# Patient Record
Sex: MALE | Race: WHITE | Employment: FULL TIME | URBAN - METROPOLITAN AREA
[De-identification: names, ages, dates, MRNs, and addresses within clinical notes are randomized per-mention and may not be internally consistent; named-entity substitution may affect disease eponyms.]

---

## 2017-06-21 ENCOUNTER — OFFICE VISIT (OUTPATIENT)
Dept: PRIMARY CARE CLINIC | Age: 32
End: 2017-06-21

## 2017-06-21 VITALS
BODY MASS INDEX: 38.95 KG/M2 | HEART RATE: 103 BPM | TEMPERATURE: 97.9 F | RESPIRATION RATE: 16 BRPM | SYSTOLIC BLOOD PRESSURE: 128 MMHG | HEIGHT: 68 IN | OXYGEN SATURATION: 97 % | DIASTOLIC BLOOD PRESSURE: 98 MMHG | WEIGHT: 257 LBS

## 2017-06-21 DIAGNOSIS — H66.93 BILATERAL OTITIS MEDIA, UNSPECIFIED CHRONICITY, UNSPECIFIED OTITIS MEDIA TYPE: Primary | ICD-10-CM

## 2017-06-21 DIAGNOSIS — J01.10 ACUTE NON-RECURRENT FRONTAL SINUSITIS: ICD-10-CM

## 2017-06-21 PROCEDURE — G8417 CALC BMI ABV UP PARAM F/U: HCPCS | Performed by: FAMILY MEDICINE

## 2017-06-21 PROCEDURE — 99213 OFFICE O/P EST LOW 20 MIN: CPT | Performed by: FAMILY MEDICINE

## 2017-06-21 PROCEDURE — 1036F TOBACCO NON-USER: CPT | Performed by: FAMILY MEDICINE

## 2017-06-21 PROCEDURE — 96372 THER/PROPH/DIAG INJ SC/IM: CPT | Performed by: FAMILY MEDICINE

## 2017-06-21 PROCEDURE — G8427 DOCREV CUR MEDS BY ELIG CLIN: HCPCS | Performed by: FAMILY MEDICINE

## 2017-06-21 RX ORDER — CEFTRIAXONE 1 G/1
1 INJECTION, POWDER, FOR SOLUTION INTRAMUSCULAR; INTRAVENOUS ONCE
Status: COMPLETED | OUTPATIENT
Start: 2017-06-21 | End: 2017-06-21

## 2017-06-21 RX ORDER — CEFDINIR 300 MG/1
300 CAPSULE ORAL 2 TIMES DAILY
Qty: 20 CAPSULE | Refills: 0 | Status: SHIPPED | OUTPATIENT
Start: 2017-06-21 | End: 2017-07-01

## 2017-06-21 RX ADMIN — CEFTRIAXONE 1 G: 1 INJECTION, POWDER, FOR SOLUTION INTRAMUSCULAR; INTRAVENOUS at 17:44

## 2017-06-21 ASSESSMENT — PATIENT HEALTH QUESTIONNAIRE - PHQ9
2. FEELING DOWN, DEPRESSED OR HOPELESS: 0
SUM OF ALL RESPONSES TO PHQ QUESTIONS 1-9: 0
1. LITTLE INTEREST OR PLEASURE IN DOING THINGS: 0
SUM OF ALL RESPONSES TO PHQ9 QUESTIONS 1 & 2: 0

## 2017-06-21 ASSESSMENT — ENCOUNTER SYMPTOMS
DIARRHEA: 0
SORE THROAT: 0
ABDOMINAL PAIN: 0
VOMITING: 0
SINUS PRESSURE: 1
COUGH: 0
RHINORRHEA: 0
APNEA: 0

## 2017-12-11 ENCOUNTER — OFFICE VISIT (OUTPATIENT)
Dept: PRIMARY CARE CLINIC | Age: 32
End: 2017-12-11

## 2017-12-11 VITALS
SYSTOLIC BLOOD PRESSURE: 124 MMHG | HEIGHT: 68 IN | RESPIRATION RATE: 14 BRPM | TEMPERATURE: 97.9 F | DIASTOLIC BLOOD PRESSURE: 88 MMHG | OXYGEN SATURATION: 97 % | WEIGHT: 260.4 LBS | BODY MASS INDEX: 39.47 KG/M2 | HEART RATE: 94 BPM

## 2017-12-11 DIAGNOSIS — Z00.00 PREVENTATIVE HEALTH CARE: Primary | ICD-10-CM

## 2017-12-11 PROCEDURE — 99395 PREV VISIT EST AGE 18-39: CPT | Performed by: INTERNAL MEDICINE

## 2017-12-11 ASSESSMENT — ENCOUNTER SYMPTOMS
VOICE CHANGE: 0
CHOKING: 0
NAUSEA: 0
VOMITING: 0
SHORTNESS OF BREATH: 0
TROUBLE SWALLOWING: 0
PHOTOPHOBIA: 0

## 2017-12-11 NOTE — PROGRESS NOTES
Gabriela Delgado 29 y.o. male presents today with   Chief Complaint   Patient presents with    Employment Physical     patient is here for physical for work, patient forgot form and will fax to us. HPIphysical    No past medical history on file. There are no active problems to display for this patient. No past surgical history on file. Family History   Problem Relation Age of Onset    Heart Disease Maternal Grandfather     High Blood Pressure Maternal Grandfather      Social History     Social History    Marital status: Single     Spouse name: N/A    Number of children: N/A    Years of education: N/A     Social History Main Topics    Smoking status: Never Smoker    Smokeless tobacco: Never Used    Alcohol use Yes      Comment: occasional    Drug use: No    Sexual activity: Yes     Other Topics Concern    None     Social History Narrative    None     No Known Allergies    Review of Systems   Constitutional: Negative for fatigue and fever. HENT: Negative for trouble swallowing and voice change. Eyes: Negative for photophobia and visual disturbance. Respiratory: Negative for choking and shortness of breath. Cardiovascular: Negative for chest pain and palpitations. Gastrointestinal: Negative for nausea and vomiting. Genitourinary: Negative for decreased urine volume, testicular pain and urgency. Skin: Negative for rash. Neurological: Negative for tremors and syncope. Hematological: Does not bruise/bleed easily. Psychiatric/Behavioral: Negative for suicidal ideas. Vitals:    12/11/17 1731   BP: 124/88   Site: Left Arm   Position: Sitting   Cuff Size: Large Adult   Pulse: 94   Resp: 14   Temp: 97.9 °F (36.6 °C)   TempSrc: Tympanic   SpO2: 97%   Weight: 260 lb 6.4 oz (118.1 kg)   Height: 5' 8\" (1.727 m)       Physical Exam   Constitutional: He appears well-developed and well-nourished. HENT:   Head: Normocephalic and atraumatic.    Eyes: Conjunctivae and EOM are normal. Pupils are equal, round, and reactive to light. Neck: Normal range of motion. No thyromegaly present. Cardiovascular: Normal rate and regular rhythm. Pulmonary/Chest: Effort normal. No respiratory distress. Abdominal: Soft. Bowel sounds are normal. He exhibits no distension. Musculoskeletal: Normal range of motion. He exhibits no edema. Neurological: He is alert. No cranial nerve deficit. Skin: Skin is warm and dry. Eye exam     Both: 20/20  Right: 20/20  Left: 20/15    Assessment/Plan  [facility] [employer] was seen today for employment physical.    Diagnoses and all orders for this visit:    Preventative health care        Return if symptoms worsen or fail to improve.     Rhina Martinez MD

## 2018-12-21 ENCOUNTER — OFFICE VISIT (OUTPATIENT)
Dept: PRIMARY CARE CLINIC | Age: 33
End: 2018-12-21
Payer: COMMERCIAL

## 2018-12-21 VITALS
DIASTOLIC BLOOD PRESSURE: 84 MMHG | HEIGHT: 68 IN | WEIGHT: 262 LBS | SYSTOLIC BLOOD PRESSURE: 122 MMHG | HEART RATE: 86 BPM | RESPIRATION RATE: 14 BRPM | BODY MASS INDEX: 39.71 KG/M2 | OXYGEN SATURATION: 98 % | TEMPERATURE: 97.4 F

## 2018-12-21 DIAGNOSIS — Z00.00 PREVENTATIVE HEALTH CARE: ICD-10-CM

## 2018-12-21 DIAGNOSIS — Z00.00 PREVENTATIVE HEALTH CARE: Primary | ICD-10-CM

## 2018-12-21 PROCEDURE — 99395 PREV VISIT EST AGE 18-39: CPT | Performed by: INTERNAL MEDICINE

## 2018-12-21 PROCEDURE — G8484 FLU IMMUNIZE NO ADMIN: HCPCS | Performed by: INTERNAL MEDICINE

## 2018-12-21 ASSESSMENT — ENCOUNTER SYMPTOMS
VOMITING: 0
CHOKING: 0
SHORTNESS OF BREATH: 0
NAUSEA: 0
PHOTOPHOBIA: 0
VOICE CHANGE: 0
TROUBLE SWALLOWING: 0

## 2018-12-21 ASSESSMENT — PATIENT HEALTH QUESTIONNAIRE - PHQ9
SUM OF ALL RESPONSES TO PHQ QUESTIONS 1-9: 0
SUM OF ALL RESPONSES TO PHQ9 QUESTIONS 1 & 2: 0
SUM OF ALL RESPONSES TO PHQ QUESTIONS 1-9: 0
2. FEELING DOWN, DEPRESSED OR HOPELESS: 0
1. LITTLE INTEREST OR PLEASURE IN DOING THINGS: 0

## 2019-11-15 ENCOUNTER — HOSPITAL ENCOUNTER (OUTPATIENT)
Dept: GENERAL RADIOLOGY | Age: 34
Discharge: HOME | End: 2019-11-17
Payer: COMMERCIAL

## 2019-11-15 ENCOUNTER — OFFICE VISIT (OUTPATIENT)
Dept: FAMILY MEDICINE CLINIC | Age: 34
End: 2019-11-15
Payer: COMMERCIAL

## 2019-11-15 VITALS
BODY MASS INDEX: 39.53 KG/M2 | SYSTOLIC BLOOD PRESSURE: 132 MMHG | HEIGHT: 68 IN | WEIGHT: 260.8 LBS | DIASTOLIC BLOOD PRESSURE: 82 MMHG | TEMPERATURE: 97.8 F | RESPIRATION RATE: 16 BRPM | HEART RATE: 84 BPM | OXYGEN SATURATION: 98 %

## 2019-11-15 DIAGNOSIS — R07.81 RIB PAIN: ICD-10-CM
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
## 2019-11-15 DIAGNOSIS — M72.2 PLANTAR FASCIITIS: ICD-10-CM

## 2019-11-15 DIAGNOSIS — R10.10 PAIN OF UPPER ABDOMEN: ICD-10-CM
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
## 2019-11-15 PROCEDURE — G8417 CALC BMI ABV UP PARAM F/U: HCPCS | Performed by: NURSE PRACTITIONER

## 2019-11-15 PROCEDURE — G8427 DOCREV CUR MEDS BY ELIG CLIN: HCPCS | Performed by: NURSE PRACTITIONER

## 2019-11-15 PROCEDURE — 99214 OFFICE O/P EST MOD 30 MIN: CPT | Performed by: NURSE PRACTITIONER

## 2019-11-15 PROCEDURE — G8484 FLU IMMUNIZE NO ADMIN: HCPCS | Performed by: NURSE PRACTITIONER

## 2019-11-15 PROCEDURE — 1036F TOBACCO NON-USER: CPT | Performed by: NURSE PRACTITIONER

## 2019-11-15 PROCEDURE — 71101 X-RAY EXAM UNILAT RIBS/CHEST: CPT

## 2019-11-15 RX ORDER — CYCLOBENZAPRINE HCL 10 MG
10 TABLET ORAL 3 TIMES DAILY PRN
Qty: 30 TABLET | Refills: 0 | Status: SHIPPED | OUTPATIENT
Start: 2019-11-15 | End: 2019-11-25

## 2019-11-15 RX ORDER — IBUPROFEN 800 MG/1
800 TABLET ORAL 3 TIMES DAILY
Qty: 21 TABLET | Refills: 0 | Status: SHIPPED | OUTPATIENT
Start: 2019-11-15 | End: 2021-02-03

## 2019-11-15 ASSESSMENT — PATIENT HEALTH QUESTIONNAIRE - PHQ9
2. FEELING DOWN, DEPRESSED OR HOPELESS: 0
SUM OF ALL RESPONSES TO PHQ9 QUESTIONS 1 & 2: 0
1. LITTLE INTEREST OR PLEASURE IN DOING THINGS: 0
SUM OF ALL RESPONSES TO PHQ QUESTIONS 1-9: 0
SUM OF ALL RESPONSES TO PHQ QUESTIONS 1-9: 0

## 2019-11-17 PROBLEM — M25.571 CHRONIC PAIN OF RIGHT ANKLE: Status: ACTIVE | Noted: 2019-11-17

## 2019-11-17 PROBLEM — M72.2 PLANTAR FASCIITIS: Status: ACTIVE | Noted: 2019-11-17

## 2019-11-17 PROBLEM — R07.81 RIB PAIN: Status: ACTIVE | Noted: 2019-11-17

## 2019-11-17 PROBLEM — R10.10 PAIN OF UPPER ABDOMEN: Status: ACTIVE | Noted: 2019-11-17

## 2019-11-17 PROBLEM — G89.29 CHRONIC PAIN OF RIGHT ANKLE: Status: ACTIVE | Noted: 2019-11-17
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
## 2019-11-17 PROBLEM — M79.671 RIGHT FOOT PAIN: Status: ACTIVE | Noted: 2019-11-17

## 2019-11-17 ASSESSMENT — ENCOUNTER SYMPTOMS
SHORTNESS OF BREATH: 0
CHEST TIGHTNESS: 0
CONSTIPATION: 0
BACK PAIN: 1
DIARRHEA: 0
NAUSEA: 0
ABDOMINAL PAIN: 1
VOMITING: 0

## 2019-12-04 ENCOUNTER — TELEPHONE (OUTPATIENT)
Dept: FAMILY MEDICINE CLINIC | Age: 34
End: 2019-12-04
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
## 2021-01-11 ENCOUNTER — OFFICE VISIT (OUTPATIENT)
Dept: PRIMARY CARE CLINIC | Age: 36
End: 2021-01-11
Payer: COMMERCIAL

## 2021-01-11 VITALS
DIASTOLIC BLOOD PRESSURE: 80 MMHG | RESPIRATION RATE: 16 BRPM | HEIGHT: 68 IN | HEART RATE: 64 BPM | BODY MASS INDEX: 40.65 KG/M2 | WEIGHT: 268.2 LBS | SYSTOLIC BLOOD PRESSURE: 135 MMHG

## 2021-01-11 DIAGNOSIS — K21.9 GASTROESOPHAGEAL REFLUX DISEASE WITHOUT ESOPHAGITIS: Primary | ICD-10-CM

## 2021-01-11 DIAGNOSIS — E03.9 HYPOTHYROIDISM, UNSPECIFIED TYPE: ICD-10-CM

## 2021-01-11 DIAGNOSIS — R73.9 HYPERGLYCEMIA: ICD-10-CM

## 2021-01-11 DIAGNOSIS — R07.89 CHEST WALL PAIN: ICD-10-CM

## 2021-01-11 DIAGNOSIS — K21.9 GASTROESOPHAGEAL REFLUX DISEASE WITHOUT ESOPHAGITIS: ICD-10-CM

## 2021-01-11 DIAGNOSIS — E78.5 HYPERLIPIDEMIA, UNSPECIFIED HYPERLIPIDEMIA TYPE: ICD-10-CM

## 2021-01-11 LAB
ALBUMIN SERPL-MCNC: 4.8 G/DL (ref 3.5–4.6)
ALP BLD-CCNC: 72 U/L (ref 35–104)
ALT SERPL-CCNC: 70 U/L (ref 0–41)
AMYLASE: 31 U/L (ref 22–93)
ANION GAP SERPL CALCULATED.3IONS-SCNC: 13 MEQ/L (ref 9–15)
AST SERPL-CCNC: 39 U/L (ref 0–40)
BASOPHILS ABSOLUTE: 0 K/UL (ref 0–0.2)
BASOPHILS RELATIVE PERCENT: 0.5 %
BILIRUB SERPL-MCNC: 0.5 MG/DL (ref 0.2–0.7)
BUN BLDV-MCNC: 20 MG/DL (ref 6–20)
CALCIUM SERPL-MCNC: 9.6 MG/DL (ref 8.5–9.9)
CHLORIDE BLD-SCNC: 104 MEQ/L (ref 95–107)
CHOLESTEROL, TOTAL: 250 MG/DL (ref 0–199)
CO2: 25 MEQ/L (ref 20–31)
CREAT SERPL-MCNC: 0.93 MG/DL (ref 0.7–1.2)
EOSINOPHILS ABSOLUTE: 0.1 K/UL (ref 0–0.7)
EOSINOPHILS RELATIVE PERCENT: 0.9 %
GFR AFRICAN AMERICAN: >60
GFR NON-AFRICAN AMERICAN: >60
GLOBULIN: 2.8 G/DL (ref 2.3–3.5)
GLUCOSE BLD-MCNC: 112 MG/DL (ref 70–99)
HBA1C MFR BLD: 5.4 % (ref 4.8–5.9)
HCT VFR BLD CALC: 49.2 % (ref 42–52)
HDLC SERPL-MCNC: 47 MG/DL (ref 40–59)
HEMOGLOBIN: 16.9 G/DL (ref 14–18)
LDL CHOLESTEROL CALCULATED: 163 MG/DL (ref 0–129)
LIPASE: 49 U/L (ref 12–95)
LYMPHOCYTES ABSOLUTE: 2 K/UL (ref 1–4.8)
LYMPHOCYTES RELATIVE PERCENT: 26.4 %
MCH RBC QN AUTO: 28.6 PG (ref 27–31.3)
MCHC RBC AUTO-ENTMCNC: 34.4 % (ref 33–37)
MCV RBC AUTO: 83 FL (ref 80–100)
MONOCYTES ABSOLUTE: 0.4 K/UL (ref 0.2–0.8)
MONOCYTES RELATIVE PERCENT: 6 %
NEUTROPHILS ABSOLUTE: 4.9 K/UL (ref 1.4–6.5)
NEUTROPHILS RELATIVE PERCENT: 66.2 %
PDW BLD-RTO: 12.2 % (ref 11.5–14.5)
PLATELET # BLD: 159 K/UL (ref 130–400)
POTASSIUM SERPL-SCNC: 4.5 MEQ/L (ref 3.4–4.9)
RBC # BLD: 5.93 M/UL (ref 4.7–6.1)
SODIUM BLD-SCNC: 142 MEQ/L (ref 135–144)
TOTAL PROTEIN: 7.6 G/DL (ref 6.3–8)
TRIGL SERPL-MCNC: 198 MG/DL (ref 0–150)
TSH REFLEX: 0.94 UIU/ML (ref 0.44–3.86)
WBC # BLD: 7.4 K/UL (ref 4.8–10.8)

## 2021-01-11 PROCEDURE — G8419 CALC BMI OUT NRM PARAM NOF/U: HCPCS | Performed by: INTERNAL MEDICINE

## 2021-01-11 PROCEDURE — G8484 FLU IMMUNIZE NO ADMIN: HCPCS | Performed by: INTERNAL MEDICINE

## 2021-01-11 PROCEDURE — G8427 DOCREV CUR MEDS BY ELIG CLIN: HCPCS | Performed by: INTERNAL MEDICINE

## 2021-01-11 PROCEDURE — 1036F TOBACCO NON-USER: CPT | Performed by: INTERNAL MEDICINE

## 2021-01-11 PROCEDURE — 99214 OFFICE O/P EST MOD 30 MIN: CPT | Performed by: INTERNAL MEDICINE

## 2021-01-11 RX ORDER — PANTOPRAZOLE SODIUM 40 MG/1
40 TABLET, DELAYED RELEASE ORAL DAILY
Qty: 30 TABLET | Refills: 5 | Status: SHIPPED | OUTPATIENT
Start: 2021-01-11 | End: 2021-02-14 | Stop reason: SDUPTHER

## 2021-01-11 SDOH — ECONOMIC STABILITY: FOOD INSECURITY: WITHIN THE PAST 12 MONTHS, THE FOOD YOU BOUGHT JUST DIDN'T LAST AND YOU DIDN'T HAVE MONEY TO GET MORE.: PATIENT DECLINED

## 2021-01-11 SDOH — ECONOMIC STABILITY: INCOME INSECURITY: HOW HARD IS IT FOR YOU TO PAY FOR THE VERY BASICS LIKE FOOD, HOUSING, MEDICAL CARE, AND HEATING?: NOT HARD AT ALL

## 2021-01-11 SDOH — ECONOMIC STABILITY: TRANSPORTATION INSECURITY
IN THE PAST 12 MONTHS, HAS THE LACK OF TRANSPORTATION KEPT YOU FROM MEDICAL APPOINTMENTS OR FROM GETTING MEDICATIONS?: PATIENT DECLINED

## 2021-01-11 ASSESSMENT — ENCOUNTER SYMPTOMS
CHEST TIGHTNESS: 0
BACK PAIN: 0
COUGH: 0
NAUSEA: 0
VOICE CHANGE: 0
WHEEZING: 0
PHOTOPHOBIA: 0
TROUBLE SWALLOWING: 0
CHOKING: 0
SHORTNESS OF BREATH: 0
ABDOMINAL PAIN: 1
VOMITING: 0

## 2021-01-11 ASSESSMENT — PATIENT HEALTH QUESTIONNAIRE - PHQ9
2. FEELING DOWN, DEPRESSED OR HOPELESS: 0
SUM OF ALL RESPONSES TO PHQ QUESTIONS 1-9: 0
1. LITTLE INTEREST OR PLEASURE IN DOING THINGS: 0
SUM OF ALL RESPONSES TO PHQ QUESTIONS 1-9: 0
SUM OF ALL RESPONSES TO PHQ9 QUESTIONS 1 & 2: 0
SUM OF ALL RESPONSES TO PHQ QUESTIONS 1-9: 0

## 2021-01-11 NOTE — PROGRESS NOTES
Clyde Denver 37 y.o. male presents today with   Chief Complaint   Patient presents with    GI Problem     xWeeks    Neck Pain     xMonths, pt concerned about hernia in neck        Chest Pain   This is a recurrent problem. The current episode started more than 1 month ago. The onset quality is sudden. The problem occurs intermittently. The problem has been waxing and waning. The pain is at a severity of 1/10. The pain is mild. The quality of the pain is described as sharp. Associated symptoms include abdominal pain (insurance deny ct abd). Pertinent negatives include no back pain, cough, fever, nausea, palpitations, shortness of breath or vomiting. a year ago playing [employer] got hit and fell to ice. Not right since. History reviewed. No pertinent past medical history. Patient Active Problem List    Diagnosis Date Noted    Rib pain 11/17/2019    Pain of upper abdomen 11/17/2019    Right foot pain 11/17/2019    Chronic pain of right ankle 11/17/2019    Plantar fasciitis 11/17/2019     History reviewed. No pertinent surgical history.   Family History   Problem Relation Age of Onset    Heart Disease Maternal Grandfather     High Blood Pressure Maternal Grandfather      Social History     Socioeconomic History    Marital status: Single     Spouse name: None    Number of children: None    Years of education: None    Highest education level: None   Occupational History    None   Social Needs    Financial resource strain: Not hard at all   [place] insecurity     Worry: Patient refused     Inability: Patient refused    Transportation needs     Medical: Patient refused     Non-medical: Patient refused   Tobacco Use    Smoking status: Never Smoker    Smokeless tobacco: Never Used   Substance and Sexual Activity    Alcohol use: Yes     Comment: occasional    Drug use: No    Sexual activity: Yes   Lifestyle    Physical activity     Days per week: None     Minutes per session: None    Stress: None Relationships    Social connections     Talks on phone: None     Gets together: None     Attends Amish service: None     Active member of club or organization: None     Attends meetings of clubs or organizations: None     Relationship status: None    Intimate partner violence     Fear of current or ex partner: None     Emotionally abused: None     Physically abused: None     Forced sexual activity: None   Other Topics Concern    None   Social History Narrative    None     No Known Allergies    Review of Systems   Constitutional: Negative for fatigue and fever. HENT: Negative for congestion, trouble swallowing and voice change. Eyes: Negative for photophobia and visual disturbance. Respiratory: Negative for cough, choking, chest tightness, shortness of breath and wheezing. Cardiovascular: Positive for chest pain. Negative for palpitations. Gastrointestinal: Positive for abdominal pain (insurance deny ct abd). Negative for nausea and vomiting. Genitourinary: Negative for decreased urine volume, testicular pain and urgency. Musculoskeletal: Negative for back pain. Skin: Negative for rash. Neurological: Negative for tremors and syncope. Hematological: Does not bruise/bleed easily. Psychiatric/Behavioral: Negative for suicidal ideas. Vitals:    01/11/21 1109   BP: 135/80   Site: Left Upper Arm   Position: Sitting   Cuff Size: Large Adult   Pulse: 64   Resp: 16   Weight: 268 lb 3.2 oz (121.7 kg)   Height: 5' 8\" (1.727 m)       Physical Exam  Constitutional:       Appearance: He is well-developed. HENT:      Head: Normocephalic and atraumatic. Mouth/Throat:      Mouth: Mucous membranes are moist.   Eyes:      Conjunctiva/sclera: Conjunctivae normal.      Pupils: Pupils are equal, round, and reactive to light. Neck:      Musculoskeletal: Normal range of motion. Cardiovascular:      Rate and Rhythm: Normal rate and regular rhythm.    Pulmonary: Effort: Pulmonary effort is normal. No respiratory distress. Breath sounds: No wheezing or rales. Abdominal:      General: Bowel sounds are normal. There is no distension. Palpations: Abdomen is soft. Tenderness: There is abdominal tenderness. Musculoskeletal: Normal range of motion. Skin:     General: Skin is dry. Coloration: Skin is not jaundiced. Neurological:      Mental Status: He is alert. Cranial Nerves: No cranial nerve deficit. Psychiatric:         Mood and Affect: Mood normal.     Assessment/Plan  Kenney was seen today for gi problem and neck pain. Diagnoses and all orders for this visit:    Gastroesophageal reflux disease without esophagitis  -     pantoprazole (PROTONIX) 40 MG tablet; Take 1 tablet by mouth daily  -     Ambulatory referral to Gastroenterology  -     TSH with Reflex; Future  -     Comprehensive Metabolic Panel; Future  -     CBC With Auto Differential; Future  -     Amylase; Future  -     Lipase; Future    Chest wall pain  -     CT CHEST WO CONTRAST; Future  -     TSH with Reflex; Future  -     Comprehensive Metabolic Panel; Future  -     CBC With Auto Differential; Future  -     Amylase; Future    Hyperlipidemia, unspecified hyperlipidemia type  -     Lipid Panel; Future    Hyperglycemia  -     Hemoglobin A1C; Future    Hypothyroidism, unspecified type  -     TSH with Reflex; Future        No follow-ups on file.     Melisa Mccrary MD

## 2021-01-18 ENCOUNTER — HOSPITAL ENCOUNTER (OUTPATIENT)
Dept: CT IMAGING | Age: 36
Discharge: HOME | End: 2021-01-20
Payer: COMMERCIAL

## 2021-01-18 ENCOUNTER — TELEPHONE (OUTPATIENT)
Dept: PRIMARY CARE CLINIC | Age: 36
End: 2021-01-18

## 2021-01-18 DIAGNOSIS — R07.89 CHEST WALL PAIN: ICD-10-CM

## 2021-01-18 PROCEDURE — 71250 CT THORAX DX C-: CPT

## 2021-01-19 DIAGNOSIS — S42.018D CLOSED NONDISPLACED FRACTURE OF STERNAL END OF LEFT CLAVICLE WITH ROUTINE HEALING, SUBSEQUENT ENCOUNTER: Primary | ICD-10-CM

## 2021-01-19 NOTE — TELEPHONE ENCOUNTER
Patient aware.  Patient would like to see ortho patient also stated he canceled his appt with GI because he wasn't having that problem anymore and would like to know if you think he should keep GI appt

## 2021-01-21 DIAGNOSIS — E78.5 HYPERLIPIDEMIA, UNSPECIFIED HYPERLIPIDEMIA TYPE: Primary | ICD-10-CM

## 2021-01-22 ENCOUNTER — TELEPHONE (OUTPATIENT)
Dept: PRIMARY CARE CLINIC | Age: 36
End: 2021-01-22

## 2021-01-22 DIAGNOSIS — R07.9 CHEST PAIN, UNSPECIFIED TYPE: Primary | ICD-10-CM

## 2021-01-22 NOTE — TELEPHONE ENCOUNTER
He is asking if you can order an EKG for him? He said he is worried about high cholesterol and SOB. Call him at [phone].

## 2021-01-27 ENCOUNTER — APPOINTMENT (OUTPATIENT)
Dept: CT IMAGING | Age: 36
End: 2021-01-27
Payer: COMMERCIAL

## 2021-01-27 ENCOUNTER — HOSPITAL ENCOUNTER (EMERGENCY)
Age: 36
Discharge: HOME | End: 2021-01-27
Payer: COMMERCIAL

## 2021-01-27 VITALS
HEIGHT: 69 IN | SYSTOLIC BLOOD PRESSURE: 155 MMHG | DIASTOLIC BLOOD PRESSURE: 101 MMHG | WEIGHT: 245 LBS | TEMPERATURE: 97.8 F | RESPIRATION RATE: 18 BRPM | BODY MASS INDEX: 36.29 KG/M2 | OXYGEN SATURATION: 99 % | HEART RATE: 75 BPM

## 2021-01-27 DIAGNOSIS — R10.10 PAIN OF UPPER ABDOMEN: Primary | ICD-10-CM

## 2021-01-27 LAB
ALBUMIN SERPL-MCNC: 4.7 G/DL (ref 3.5–4.6)
ALP BLD-CCNC: 60 U/L (ref 35–104)
ALT SERPL-CCNC: 83 U/L (ref 0–41)
ANION GAP SERPL CALCULATED.3IONS-SCNC: 9 MEQ/L (ref 9–15)
AST SERPL-CCNC: 36 U/L (ref 0–40)
BASOPHILS ABSOLUTE: 0 K/UL (ref 0–0.2)
BASOPHILS RELATIVE PERCENT: 0.4 %
BILIRUB SERPL-MCNC: 0.7 MG/DL (ref 0.2–0.7)
BILIRUBIN URINE: NEGATIVE
BLOOD, URINE: NEGATIVE
BUN BLDV-MCNC: 14 MG/DL (ref 6–20)
CALCIUM SERPL-MCNC: 9.6 MG/DL (ref 8.5–9.9)
CHLORIDE BLD-SCNC: 101 MEQ/L (ref 95–107)
CLARITY: CLEAR
CO2: 27 MEQ/L (ref 20–31)
COLOR: YELLOW
CREAT SERPL-MCNC: 0.91 MG/DL (ref 0.7–1.2)
EOSINOPHILS ABSOLUTE: 0 K/UL (ref 0–0.7)
EOSINOPHILS RELATIVE PERCENT: 0.4 %
GFR AFRICAN AMERICAN: >60
GFR NON-AFRICAN AMERICAN: >60
GLOBULIN: 2.4 G/DL (ref 2.3–3.5)
GLUCOSE BLD-MCNC: 108 MG/DL (ref 70–99)
GLUCOSE URINE: NEGATIVE MG/DL
HCT VFR BLD CALC: 46.5 % (ref 42–52)
HEMOGLOBIN: 16.3 G/DL (ref 14–18)
KETONES, URINE: NEGATIVE MG/DL
LACTIC ACID: 1.2 MMOL/L (ref 0.5–2.2)
LEUKOCYTE ESTERASE, URINE: NEGATIVE
LIPASE: 72 U/L (ref 12–95)
LYMPHOCYTES ABSOLUTE: 2 K/UL (ref 1–4.8)
LYMPHOCYTES RELATIVE PERCENT: 24.2 %
MCH RBC QN AUTO: 28.8 PG (ref 27–31.3)
MCHC RBC AUTO-ENTMCNC: 35.1 % (ref 33–37)
MCV RBC AUTO: 82.1 FL (ref 80–100)
MONOCYTES ABSOLUTE: 0.5 K/UL (ref 0.2–0.8)
MONOCYTES RELATIVE PERCENT: 6.1 %
NEUTROPHILS ABSOLUTE: 5.7 K/UL (ref 1.4–6.5)
NEUTROPHILS RELATIVE PERCENT: 68.9 %
NITRITE, URINE: NEGATIVE
PDW BLD-RTO: 11.9 % (ref 11.5–14.5)
PH UA: 6.5 (ref 5–9)
PLATELET # BLD: 157 K/UL (ref 130–400)
POTASSIUM SERPL-SCNC: 4.8 MEQ/L (ref 3.4–4.9)
PROTEIN UA: NEGATIVE MG/DL
RBC # BLD: 5.67 M/UL (ref 4.7–6.1)
SODIUM BLD-SCNC: 137 MEQ/L (ref 135–144)
SPECIFIC GRAVITY UA: 1.02 (ref 1–1.03)
TOTAL PROTEIN: 7.1 G/DL (ref 6.3–8)
URINE REFLEX TO CULTURE: NORMAL
UROBILINOGEN, URINE: 0.2 E.U./DL
WBC # BLD: 8.2 K/UL (ref 4.8–10.8)

## 2021-01-27 PROCEDURE — 2580000003 HC RX 258: Performed by: PHYSICIAN ASSISTANT

## 2021-01-27 PROCEDURE — 99283 EMERGENCY DEPT VISIT LOW MDM: CPT

## 2021-01-27 PROCEDURE — 36415 COLL VENOUS BLD VENIPUNCTURE: CPT

## 2021-01-27 PROCEDURE — 81003 URINALYSIS AUTO W/O SCOPE: CPT

## 2021-01-27 PROCEDURE — 74177 CT ABD & PELVIS W/CONTRAST: CPT

## 2021-01-27 PROCEDURE — 80053 COMPREHEN METABOLIC PANEL: CPT

## 2021-01-27 PROCEDURE — 83605 ASSAY OF LACTIC ACID: CPT

## 2021-01-27 PROCEDURE — 83690 ASSAY OF LIPASE: CPT

## 2021-01-27 PROCEDURE — 85025 COMPLETE CBC W/AUTO DIFF WBC: CPT

## 2021-01-27 PROCEDURE — 6360000002 HC RX W HCPCS: Performed by: PHYSICIAN ASSISTANT

## 2021-01-27 PROCEDURE — C9113 INJ PANTOPRAZOLE SODIUM, VIA: HCPCS | Performed by: PHYSICIAN ASSISTANT

## 2021-01-27 PROCEDURE — 6360000004 HC RX CONTRAST MEDICATION: Performed by: PHYSICIAN ASSISTANT

## 2021-01-27 PROCEDURE — 96374 THER/PROPH/DIAG INJ IV PUSH: CPT

## 2021-01-27 RX ORDER — 0.9 % SODIUM CHLORIDE 0.9 %
1000 INTRAVENOUS SOLUTION INTRAVENOUS ONCE
Status: COMPLETED | OUTPATIENT
Start: 2021-01-27 | End: 2021-01-27

## 2021-01-27 RX ORDER — PANTOPRAZOLE SODIUM 40 MG/10ML
40 INJECTION, POWDER, LYOPHILIZED, FOR SOLUTION INTRAVENOUS ONCE
Status: COMPLETED | OUTPATIENT
Start: 2021-01-27 | End: 2021-01-27

## 2021-01-27 RX ORDER — SODIUM CHLORIDE 0.9 % (FLUSH) 0.9 %
10 SYRINGE (ML) INJECTION ONCE
Status: DISCONTINUED | OUTPATIENT
Start: 2021-01-27 | End: 2021-01-27 | Stop reason: HOSPADM

## 2021-01-27 RX ADMIN — SODIUM CHLORIDE 1000 ML: 9 INJECTION, SOLUTION INTRAVENOUS at 17:22

## 2021-01-27 RX ADMIN — PANTOPRAZOLE SODIUM 40 MG: 40 INJECTION, POWDER, FOR SOLUTION INTRAVENOUS at 17:21

## 2021-01-27 RX ADMIN — IOPAMIDOL 100 ML: 612 INJECTION, SOLUTION INTRAVENOUS at 18:27

## 2021-01-27 ASSESSMENT — PAIN SCALES - GENERAL: PAINLEVEL_OUTOF10: 4

## 2021-01-27 ASSESSMENT — ENCOUNTER SYMPTOMS
TROUBLE SWALLOWING: 0
SHORTNESS OF BREATH: 0
NAUSEA: 1
APNEA: 0
COLOR CHANGE: 0
BLOOD IN STOOL: 0
DIARRHEA: 1
ANAL BLEEDING: 0
ALLERGIC/IMMUNOLOGIC NEGATIVE: 1
ABDOMINAL PAIN: 1
EYE PAIN: 0

## 2021-01-27 ASSESSMENT — PAIN DESCRIPTION - PAIN TYPE: TYPE: CHRONIC PAIN;ACUTE PAIN

## 2021-01-27 ASSESSMENT — PAIN DESCRIPTION - FREQUENCY: FREQUENCY: CONTINUOUS

## 2021-01-27 NOTE — ED PROVIDER NOTES
dizziness and numbness. Hematological: Negative. Psychiatric/Behavioral: Negative. All other systems reviewed and are negative. Except as noted above the remainder of the review of systems was reviewed and negative. PAST MEDICAL HISTORY   History reviewed. No pertinent past medical history. SURGICAL HISTORY     History reviewed. No pertinent surgical history. CURRENT MEDICATIONS       Previous Medications    IBUPROFEN (ADVIL;MOTRIN) 800 MG TABLET    Take 1 tablet by mouth 3 times daily    PANTOPRAZOLE (PROTONIX) 40 MG TABLET    Take 1 tablet by mouth daily       ALLERGIES     Patient has no known allergies.     FAMILY HISTORY       Family History   Problem Relation Age of Onset    Heart Disease Maternal Grandfather     High Blood Pressure Maternal Grandfather           SOCIAL HISTORY       Social History     Socioeconomic History    Marital status: Single     Spouse name: None    Number of children: None    Years of education: None    Highest education level: None   Occupational History    None   Social Needs    Financial resource strain: Not hard at all   [employer] insecurity     Worry: Patient refused     Inability: Patient refused    Transportation needs     Medical: Patient refused     Non-medical: Patient refused   Tobacco Use    Smoking status: Never Smoker    Smokeless tobacco: Never Used   Substance and Sexual Activity    Alcohol use: Yes     Comment: occasional    Drug use: Yes     Types: Marijuana    Sexual activity: Yes   Lifestyle    Physical activity     Days per week: None     Minutes per session: None    Stress: None   Relationships    Social connections     Talks on phone: None     Gets together: None     Attends Tenriism service: None     Active member of club or organization: None     Attends meetings of clubs or organizations: None     Relationship status: None    Intimate partner violence     Fear of current or ex partner: None     Emotionally abused: None     Physically abused: None     Forced sexual activity: None   Other Topics Concern    None   Social History Narrative    None       SCREENINGS           PHYSICAL EXAM    (up to 7 forlevel 4, 8 or more for level 5)     ED Triage Vitals [01/27/21 1640]   BP Temp Temp Source Pulse Resp SpO2 Height Weight   (!) 179/101 97.8 °F (36.6 °C) Oral 90 18 99 % 5' 9\" (1.753 m) 245 lb (111.1 kg)       Physical Exam  Vitals signs and nursing note reviewed. Constitutional:       General: He is not in acute distress. Appearance: He is well-developed. He is not diaphoretic. HENT:      Head: Normocephalic and atraumatic. Mouth/Throat:      Pharynx: No oropharyngeal exudate. Eyes:      General: No scleral icterus. Conjunctiva/sclera: Conjunctivae normal.      Pupils: Pupils are equal, round, and reactive to light. Neck:      Musculoskeletal: Normal range of motion and neck supple. Trachea: No tracheal deviation. Cardiovascular:      Rate and Rhythm: Normal rate. Heart sounds: Normal heart sounds. Pulmonary:      Effort: Pulmonary effort is normal. No respiratory distress. Breath sounds: Normal breath sounds. Abdominal:      General: Bowel sounds are normal. There is no distension. Palpations: Abdomen is soft. Tenderness: There is abdominal tenderness in the left upper quadrant. Musculoskeletal: Normal range of motion. Skin:     General: Skin is warm and dry. Findings: No erythema or rash. Neurological:      Mental Status: He is alert and oriented to person, place, and time. Cranial Nerves: No cranial nerve deficit. Motor: No abnormal muscle tone. Psychiatric:         Behavior: Behavior normal.         Thought Content:  Thought content normal.         Judgment: Judgment normal.           DIAGNOSTIC RESULTS     RADIOLOGY:   Non-plain film images such as CT, Ultrasound and MRI are read by the radiologist. Plain radiographic images are visualized and preliminarilyinterpreted by Lane Moser PA-C with the below findings:      Interpretation per the Radiologist below, if available at the time of this note:    CT ABDOMEN PELVIS W IV CONTRAST Additional Contrast? None   Final Result   No acute intra-abdominal changes. LABS:  Labs Reviewed   COMPREHENSIVE METABOLIC PANEL - Abnormal; Notable for the following components:       Result Value    Glucose 108 (*)     Albumin 4.7 (*)     ALT 83 (*)     All other components within normal limits   CBC WITH AUTO DIFFERENTIAL   LIPASE   LACTIC ACID, PLASMA   URINE RT REFLEX TO CULTURE       All other labs were within normal range or not returnedas of this dictation. EMERGENCYDEPARTMENT COURSE and DIFFERENTIAL DIAGNOSIS/MDM:   Vitals:    Vitals:    01/27/21 1640   BP: (!) 179/101   Pulse: 90   Resp: 18   Temp: 97.8 °F (36.6 °C)   TempSrc: Oral   SpO2: 99%   Weight: 245 lb (111.1 kg)   Height: 5' 9\" (1.753 m)       REASSESSMENT      Presented the emergency department with a 2-week history of diffuse upper abdominal pain, worse on the left, decrease in appetite, intermittent loose stools and bloating. The symptoms followed a recent change in diet with heavy increase in fiber. CT scan and laboratory testing are all are unremarkable. Patient already saw his PCP and was started on pantoprazole which the patient did begin taking. Patient was also referred to GI. Given his normal CT and laboratory testing I discussed dietary changes as the cause of his symptoms but also recommended that he continue to keep the follow-ups that he is scheduled for. Return for any change or worsening symptoms    MDM    PROCEDURES:    Procedures      FINAL IMPRESSION      1.  Pain of upper abdomen          DISPOSITION/PLAN   DISPOSITION Discharge - Pending Orders Complete 01/27/2021 07:11:17 PM      PATIENT REFERRED TO:  Jreemi Wilkes MD  [address] [place] [address]  [phone]    Call in 1 week        DISCHARGE MEDICATIONS:  New Prescriptions    No medications on file       (Please note that portions of this note were completed with a voice recognition program.  Efforts were made to edit the dictations but occasionally words are mis-transcribed.)    JO Love PA-C  01/27/21 1937

## 2021-01-27 NOTE — ED TRIAGE NOTES
Pt c/o ABD/back pain for the past year and recent 25lbs weight loss the past two weeks, Pt is A&OX3, tearful, afebrile, breathes are equal and unlabored, denies vomiting

## 2021-01-29 ENCOUNTER — TELEPHONE (OUTPATIENT)
Dept: PRIMARY CARE CLINIC | Age: 36
End: 2021-01-29

## 2021-01-29 NOTE — TELEPHONE ENCOUNTER
He wants to know what the Coronary Artery Calcifications means on the CT Chest? His # is [phone].

## 2021-02-01 ENCOUNTER — OFFICE VISIT (OUTPATIENT)
Dept: ORTHOPEDIC SURGERY | Age: 36
End: 2021-02-01
Payer: COMMERCIAL

## 2021-02-01 ENCOUNTER — TELEPHONE (OUTPATIENT)
Dept: PRIMARY CARE CLINIC | Age: 36
End: 2021-02-01

## 2021-02-01 VITALS — WEIGHT: 248 LBS | HEIGHT: 69 IN | BODY MASS INDEX: 36.73 KG/M2 | OXYGEN SATURATION: 99 % | HEART RATE: 86 BPM

## 2021-02-01 DIAGNOSIS — R07.9 CHEST PAIN, UNSPECIFIED TYPE: Primary | ICD-10-CM

## 2021-02-01 PROCEDURE — 99204 OFFICE O/P NEW MOD 45 MIN: CPT | Performed by: ORTHOPAEDIC SURGERY

## 2021-02-01 PROCEDURE — G8427 DOCREV CUR MEDS BY ELIG CLIN: HCPCS | Performed by: ORTHOPAEDIC SURGERY

## 2021-02-01 PROCEDURE — 1036F TOBACCO NON-USER: CPT | Performed by: ORTHOPAEDIC SURGERY

## 2021-02-01 PROCEDURE — G8484 FLU IMMUNIZE NO ADMIN: HCPCS | Performed by: ORTHOPAEDIC SURGERY

## 2021-02-01 PROCEDURE — G8417 CALC BMI ABV UP PARAM F/U: HCPCS | Performed by: ORTHOPAEDIC SURGERY

## 2021-02-01 NOTE — PROGRESS NOTES
Subjective:      Patient ID: Gita Hanson is a 37 y.o. male who presents today for:  Chief Complaint   Patient presents with    New Patient     closed nondisplaced fracture of sternal end of left clavicle with routing healing/ he was injured 15months ago on hockey / cat scan 01/18/21 and 01/27/2021/ he has been takin tylenol and motrin on a bad day his pain 6/10       HPI  This pleasant 53-year-old gentleman comes in here today referred from the ER after he was seen there for some thoracic and abdominal pain  He thinks he might have hurt his collarbone a year and a half ago but does not really recall anything  The discomfort that he is complaining about is around his rib cage and his abdomen    Past Medical History:   Diagnosis Date    Scoliosis      No past surgical history on file.   Social History     Socioeconomic History    Marital status: Single     Spouse name: Not on file    Number of children: Not on file    Years of education: Not on file    Highest education level: Not on file   Occupational History    Not on file   Social Needs    Financial resource strain: Not hard at all    Food insecurity     Worry: Patient refused     Inability: Patient refused    Transportation needs     Medical: Patient refused     Non-medical: Patient refused   Tobacco Use    Smoking status: Never Smoker    Smokeless tobacco: Never Used   Substance and Sexual Activity    Alcohol use: Yes     Comment: occasional    Drug use: Yes     Types: Marijuana    Sexual activity: Yes   Lifestyle    Physical activity     Days per week: Not on file     Minutes per session: Not on file    Stress: Not on file   Relationships    Social connections     Talks on phone: Not on file     Gets together: Not on file     Attends Buddhist service: Not on file     Active member of club or organization: Not on file     Attends meetings of clubs or organizations: Not on file     Relationship status: Not on file  Intimate partner violence     Fear of current or ex partner: Not on file     Emotionally abused: Not on file     Physically abused: Not on file     Forced sexual activity: Not on file   Other Topics Concern    Not on file   Social History Narrative    Not on file     Family History   Problem Relation Age of Onset    Heart Disease Maternal Grandfather     High Blood Pressure Maternal Grandfather      No Known Allergies  Current Outpatient Medications on File Prior to Visit   Medication Sig Dispense Refill    pantoprazole (PROTONIX) 40 MG tablet Take 1 tablet by mouth daily 30 tablet 5    ibuprofen (ADVIL;MOTRIN) 800 MG tablet Take 1 tablet by mouth 3 times daily 21 tablet 0     No current facility-administered medications on file prior to visit. Review of Systems   Constitutional: Negative for chills and fever. Cardiovascular: Negative for chest pain. Musculoskeletal: Positive for myalgias (ribcage). Neurological: Negative for dizziness. Objective:   Pulse 86   Ht 5' 9\" (1.753 m)   Wt 248 lb (112.5 kg)   SpO2 99%   BMI 36.62 kg/m²     Ortho Exam  He has no tenderness at the shoulder  He has no tenderness along the medial clavicle  He does not have any limitations to range of motion    Radiographs and Laboratory Studies:     Diagnostic Imaging Studies:    Reviewed his CT scan which showed questionable injury to the medial clavicle very small  Does not appear to be acute, and given complete lack of any tenderness in that area certainly does not appear to be acute    Assessment:       Diagnosis Orders   1. Chest pain, unspecified type           Plan:   I doubt that the patient has an orthopedic issue he did discuss with me that he may have some anxiety issues I did recommend that he make an appointment to see Dr. Poonam Mackenzie his primary care physician discussed these  There are no orthopedic issues here that need to be addressed    No orders of the defined types were placed in this encounter. No orders of the defined types were placed in this encounter. Return if symptoms worsen or fail to improve.       Manjeet Ramirez, DO

## 2021-02-02 ENCOUNTER — VIRTUAL VISIT (OUTPATIENT)
Dept: GASTROENTEROLOGY | Age: 36
End: 2021-02-02
Payer: COMMERCIAL

## 2021-02-02 DIAGNOSIS — K21.00 GASTROESOPHAGEAL REFLUX DISEASE WITH ESOPHAGITIS, UNSPECIFIED WHETHER HEMORRHAGE: Primary | ICD-10-CM

## 2021-02-02 DIAGNOSIS — R79.89 ABNORMAL LFTS: ICD-10-CM

## 2021-02-02 PROCEDURE — G8427 DOCREV CUR MEDS BY ELIG CLIN: HCPCS | Performed by: SPECIALIST

## 2021-02-02 PROCEDURE — 99203 OFFICE O/P NEW LOW 30 MIN: CPT | Performed by: SPECIALIST

## 2021-02-02 PROCEDURE — G8484 FLU IMMUNIZE NO ADMIN: HCPCS | Performed by: SPECIALIST

## 2021-02-02 PROCEDURE — G8417 CALC BMI ABV UP PARAM F/U: HCPCS | Performed by: SPECIALIST

## 2021-02-02 PROCEDURE — 1036F TOBACCO NON-USER: CPT | Performed by: SPECIALIST

## 2021-02-02 ASSESSMENT — ENCOUNTER SYMPTOMS
ABDOMINAL DISTENTION: 0
EYES NEGATIVE: 1
BLOOD IN STOOL: 0
VOMITING: 0
DIARRHEA: 0
CONSTIPATION: 0
NAUSEA: 0
RESPIRATORY NEGATIVE: 1
ANAL BLEEDING: 0
ABDOMINAL PAIN: 1
RECTAL PAIN: 0

## 2021-02-02 NOTE — PROGRESS NOTES
Gastroenterology Clinic Visit    Gita Hanson  [id number]  Chief Complaint   Patient presents with    Gastroesophageal Reflux     getting better protonix, ct on 01/27/2021, diarrhea on and off for 2 wks, no blood, no vomiting no nausea,        HPI: 37 y.o. male presents to the clinic with history of abdominal pain. This was a video encounter and patient was had at home and I was in my office. Patient was experiencing abdominal pain and also some chest pain and was seen in the ER, had a CT of the abdomen done which showed no significant abnormality, CMP showed mildly elevated ALT.,  A prior blood test done before that also showed abnormal ALT. He was having some heartburn and patient was placed on Protonix with improvement in his symptoms, not having any abdominal pain no pain was mostly in the left upper quadrant area. Had some soft stool which has resolved and stool is normal now. . No nausea no vomiting has occasional heartburn depending on his food, she of 20 pound voluntary weight loss. Patient is going to see a cardiologist tomorrow because of chest pain and also pain in the neck area. Has been using medical marijuana for stress anxiety and some of the chest pain and neck pain. Smokes cigarette occasionally and also drinks alcohol regularly but recently has cut down the alcohol intake. Family history mother had coronary artery disease no history of any liver disease, duration of the video encounter was 24 minutes    Review of Systems   Constitutional: Negative. HENT: Negative. Eyes: Negative. Respiratory: Negative. Cardiovascular: Positive for chest pain. Gastrointestinal: Positive for abdominal pain. Negative for abdominal distention, anal bleeding, blood in stool, constipation, diarrhea, nausea, rectal pain and vomiting. History of GERD   Genitourinary: Negative. Musculoskeletal: Negative. Neurological: Negative. Psychiatric/Behavioral: Negative. Past Medical History:   Diagnosis Date    Scoliosis       No past surgical history on file. Current Outpatient Medications on File Prior to Visit   Medication Sig Dispense Refill    pantoprazole (PROTONIX) 40 MG tablet Take 1 tablet by mouth daily 30 tablet 5    ibuprofen (ADVIL;MOTRIN) 800 MG tablet Take 1 tablet by mouth 3 times daily 21 tablet 0     No current facility-administered medications on file prior to visit.       Family History   Problem Relation Age of Onset    Heart Disease Maternal Grandfather     High Blood Pressure Maternal Grandfather       Social History     Socioeconomic History    Marital status: Single     Spouse name: Not on file    Number of children: Not on file    Years of education: Not on file    Highest education level: Not on file   Occupational History    Not on file   Social Needs    Financial resource strain: Not hard at all   [email or web address] insecurity     Worry: Patient refused     Inability: Patient refused    Transportation needs     Medical: Patient refused     Non-medical: Patient refused   Tobacco Use    Smoking status: Never Smoker    Smokeless tobacco: Never Used   Substance and Sexual Activity    Alcohol use: Yes     Comment: occasional    Drug use: Yes     Types: Marijuana    Sexual activity: Yes   Lifestyle    Physical activity     Days per week: Not on file     Minutes per session: Not on file    Stress: Not on file   Relationships    Social connections     Talks on phone: Not on file     Gets together: Not on file     Attends Taoism service: Not on file     Active member of club or organization: Not on file     Attends meetings of clubs or organizations: Not on file     Relationship status: Not on file    Intimate partner violence     Fear of current or ex partner: Not on file     Emotionally abused: Not on file     Physically abused: Not on file     Forced sexual activity: Not on file   Other Topics Concern    Not on file   Social History Narrative  Not on file       There were no vitals taken for this visit. Physical Exam    Laboratory, Pathology, Radiology reviewed indetail with relevant important investigations summarized below:  Lab Results   Component Value Date    WBC 8.2 01/27/2021    HGB 16.3 01/27/2021    HCT 46.5 01/27/2021    MCV 82.1 01/27/2021     01/27/2021     Lab Results   Component Value Date    ALT 83 (H) 01/27/2021    AST 36 01/27/2021    ALKPHOS 60 01/27/2021    BILITOT 0.7 01/27/2021       Ct Chest Wo Contrast    Result Date: 1/18/2021  Study: CT chest without contrast Reason for exam: Anterior upper left chest pain and lower left neck pain. History of previous hockey injury. 6 month history of congestion. Findings: Unenhanced CT chest performed as requested. Soft tissues of the lower neck are unremarkable on this unenhanced CT. No soft tissue mass or adenopathy at the thoracic inlet. No anterior mediastinal mass. Lung fields are well expanded and clear of infiltrate, mass, or pleural effusion. No evidence of mediastinal mass or hilar adenopathy. Note is made however of coronary artery calcification. No pericardial effusion. Bony structures and the extrathoracic soft tissues are intact. There is a small well-circumscribed ovoid ossification, 7 mm in diameter, along the inferior aspect of the medial left clavicle which is felt to be an extra center of ossification. Remote trauma can't be entirely excluded. No soft tissue swelling or evidence of soft tissue associated edema to suggest acute avulsion fracture. Vacuum phenomena in the sternoclavicular joints bilaterally is felt to be related to positioning of the arms. No significant associated degenerative changes or evidence of erosive arthropathy. NO ACUTE PROCESS IN THE CHEST. NO EVIDENCE OF ACUTE FRACTURE OR BONY DEFORMITY. ASYMMETRIC OSSIFICATION CENTER VERSUS REMOTE SMALL AVULSION FRACTURE OF THE MEDIAL LEFT CLAVICLE HEAD JUST INFERIOR AND POSTERIOR TO THE LEFT STERNOCLAVICULAR JOINT. All CT scans at this facility use dose modulation, iterative reconstruction, and/or weight based dosing when appropriate to reduce radiation dose to as low as reasonably achievable.     Ct Abdomen Pelvis W Iv Contrast Additional Contrast? None    Result Date: 1/27/2021 EXAMINATION: CT ABDOMEN PELVIS W IV CONTRAST HISTORY:  LUQ epigastric abd pain, recent weight loss  COMPARISON: None TECHNIQUE: Contiguous axial CT sections of the abdomen and pelvis. Imaging was obtained after IV contrast administration. .  All CT scans at this facility use dose modulation, iterative reconstruction, and/or weight based dosing when appropriate to reduce radiation dose to as low as reasonably achievable. FINDINGS  Lung bases:Visualized lung bases show no significant pathology Liver: The liver is normal in size and attenuation . There are no focal solid or cystic lesions. There is no intra or extrahepatic bile duct dilatation. Gallbladder: No calcified gallstones. Normal gallbladder wall. No pericholecystic fluid. Spleen: There are no focal lesions or calcifications in the spleen. There is no splenomegaly  Pancreas: The pancreas is normal in size and attenuation without focal lesions or dilatation of the pancreatic duct. Kidneys: There are no solid renal lesions. There are prompt bilateral nephrograms after IV contrast administration with prompt excretion into nondilated collecting systems. There is no hydroureter. Adrenal glands are negative. Bowel: There is no bowel dilatation or evidence of diverticulitis. Appendix: The appendix is unremarkable. Nodes: No lymphadenopathy. Aorta: There is no abdominal aortic aneurysm. Peritoneum: No free fluid or free air. Pelvis: There are no solid or cystic lesions. The urinary bladder is within normal limits. Abdominal wall: The abdominal wall is intact. Bones : There are no acute osseous changes. Soft tissues: The soft tissues are unremarkable. No acute intra-abdominal changes.        Endoscopic investigations:    Assessmentand Plan: 37 y.o. male with history of abdominal pain and GERD symptoms which has responded very well to Protonix. Patient has abnormal ALT probably related to fatty liver. Will investigate for chronic liver disease and patient was advised to abstain from alcohol and try to lose weight, to return after 3 months. Patient advised to continue Protonix   Diagnosis Orders   1. Gastroesophageal reflux disease with esophagitis, unspecified whether hemorrhage     2. Abnormal LFTs  Alpha-1-Antitrypsin    STEPHY    Liver-Kidney Microsome (LKM-1) Ab (IgG)    MITOCHONDRIAL ANTIBODY W/REFLEX TITER    Anti-smooth muscle antibody    Ceruloplasmin    Copper, Serum    Hepatitis C Antibody    Hepatitis B Surface Antigen    Iron And Tibc     Return in about 3 months (around 5/2/2021). Susana Guevara MD   Staff Gastroenterologist  [facility]    Please note this report has been partially produced using speech recognition software and may cause contain errors related to thatsystem including grammar, punctuation and spelling as well as words and phrases that may seem inappropriate. If there are questions or concerns please feel free to contact me to clarify.

## 2021-02-03 ENCOUNTER — OFFICE VISIT (OUTPATIENT)
Dept: CARDIOLOGY CLINIC | Age: 36
End: 2021-02-03
Payer: COMMERCIAL

## 2021-02-03 VITALS
TEMPERATURE: 98.3 F | DIASTOLIC BLOOD PRESSURE: 86 MMHG | HEART RATE: 72 BPM | BODY MASS INDEX: 36.62 KG/M2 | WEIGHT: 248 LBS | SYSTOLIC BLOOD PRESSURE: 122 MMHG

## 2021-02-03 DIAGNOSIS — R07.2 PRECORDIAL PAIN: Primary | ICD-10-CM

## 2021-02-03 PROCEDURE — G8417 CALC BMI ABV UP PARAM F/U: HCPCS | Performed by: INTERNAL MEDICINE

## 2021-02-03 PROCEDURE — G8427 DOCREV CUR MEDS BY ELIG CLIN: HCPCS | Performed by: INTERNAL MEDICINE

## 2021-02-03 PROCEDURE — G8484 FLU IMMUNIZE NO ADMIN: HCPCS | Performed by: INTERNAL MEDICINE

## 2021-02-03 PROCEDURE — 99244 OFF/OP CNSLTJ NEW/EST MOD 40: CPT | Performed by: INTERNAL MEDICINE

## 2021-02-03 PROCEDURE — 93000 ELECTROCARDIOGRAM COMPLETE: CPT | Performed by: INTERNAL MEDICINE

## 2021-02-03 ASSESSMENT — ENCOUNTER SYMPTOMS
WHEEZING: 0
SHORTNESS OF BREATH: 1
APNEA: 0
ABDOMINAL DISTENTION: 0
COLOR CHANGE: 0
BLOOD IN STOOL: 0
CHEST TIGHTNESS: 0
VOMITING: 0
FACIAL SWELLING: 0
NAUSEA: 0
GASTROINTESTINAL NEGATIVE: 1
VOICE CHANGE: 0
TROUBLE SWALLOWING: 0
ANAL BLEEDING: 0

## 2021-02-03 NOTE — PROGRESS NOTES
[facility] CARDIOLOGY OFFICE NEW PATIENT        Patient: Sarai Huerta  YOB: 1985  MRN: [id number]    Chief Complaint:  Chief Complaint   Patient presents with   Babar Marinelli Cardiologist     DR Lurlean Gaucher REFERRING    Chest Pain    Shortness of Breath         Subjective/HPI:  2/3/21: Patient presents today for consulted by Dr. Cesar Coyle for chest pain. 72-year-old . Does not smoke. Has a history of alcohol abuse and has  Currently cut down. Recently saw Dr Mouna Tapia. Was started on omeprazole. It has helped some. He has family history of heart disease. Also occasionally he has been known to have high blood pressure one time he had some issues including blood pressure with systolic was 398. Going to set him up for regular stress test and echo at [place] and then see me. His lipid profile is a little concerning. His father has diabetes. He needs to lose about 2030 pounds. Triglycerides up. And I am hoping that with his suction in the use of alcohol that would be beneficial too        Past Medical History:   Diagnosis Date    Scoliosis        No past surgical history on file.     Family History   Problem Relation Age of Onset    Heart Disease Maternal Grandfather     High Blood Pressure Maternal Grandfather        Social History     Socioeconomic History    Marital status: Single     Spouse name: Not on file    Number of children: Not on file    Years of education: Not on file    Highest education level: Not on file   Occupational History    Not on file   Social Needs    Financial resource strain: Not hard at all   [employer] insecurity     Worry: Patient refused     Inability: Patient refused    Transportation needs     Medical: Patient refused     Non-medical: Patient refused   Tobacco Use    Smoking status: Never Smoker    Smokeless tobacco: Never Used   Substance and Sexual Activity    Alcohol use: Yes     Comment: occasional    Drug use: Yes     Types: Marijuana Psychiatric/Behavioral: Negative. Negative for agitation, behavioral problems, confusion, hallucinations and suicidal ideas. The patient is not nervous/anxious. All other systems reviewed and are negative. Review of System is negative except for as mentioned above. Physical Examination:    /86 (Site: Left Upper Arm, Position: Sitting, Cuff Size: Large Adult)   Pulse 72   Temp 98.3 °F (36.8 °C) (Infrared)   Wt 248 lb (112.5 kg)   BMI 36.62 kg/m²    Physical Exam   Constitutional: He appears healthy. No distress. HENT:   Nose: Nose normal.   Mouth/Throat: Dentition is normal. Oropharynx is clear. Eyes: Pupils are equal, round, and reactive to light. Conjunctivae are normal.   Neck: Normal range of motion and thyroid normal. Neck supple. Cardiovascular: Regular rhythm, S1 normal, S2 normal, normal heart sounds, intact distal pulses and normal pulses. PMI is not displaced. No murmur heard. Pulmonary/Chest: He has no wheezes. He has no rales. He exhibits no tenderness. Abdominal: Soft. Bowel sounds are normal. He exhibits no distension and no mass. There is no splenomegaly or hepatomegaly. There is no abdominal tenderness. No hernia. Neurological: He is alert and oriented to person, place, and time. He has normal motor skills. Gait normal.   Skin: Skin is warm and dry. No cyanosis. No jaundice. Nails show no clubbing.            Patient Active Problem List   Diagnosis    Rib pain    Pain of upper abdomen    Right foot pain    Chronic pain of right ankle    Plantar fasciitis           Orders Placed This Encounter   Procedures    CARDIAC STRESS TEST EXERCISE ONLY     Standing Status:   Future     Standing Expiration Date:   2/3/2022     Order Specific Question:   Reason for Exam?     Answer:   Chest pain    EKG 12 Lead     Order Specific Question:   Reason for Exam?     Answer:   Chest pain    ECHO Complete 2D W Doppler W Color     Standing Status:   Future Standing Expiration Date:   2/3/2022     Order Specific Question:   Reason for exam:     Answer:   CHEST PAIN           No orders of the defined types were placed in this encounter. Assessment:    1. Precordial pain         Plan:   Patient to have Echo and Regular Stress tests    Stay on same medications. See me in 1 month after testing      This note was partially generated using Dragon voice recognition system, and there may be some incorrect words, spellings, punctuation that were not noticed in checking the note before saving.         Electronically signed by Sheryl Barcenas MD on 2/5/2021 at 10:22 AM

## 2021-02-09 ENCOUNTER — OFFICE VISIT (OUTPATIENT)
Dept: PRIMARY CARE CLINIC | Age: 36
End: 2021-02-09
Payer: COMMERCIAL

## 2021-02-09 VITALS
TEMPERATURE: 97.5 F | HEART RATE: 74 BPM | RESPIRATION RATE: 14 BRPM | WEIGHT: 246 LBS | SYSTOLIC BLOOD PRESSURE: 128 MMHG | BODY MASS INDEX: 36.43 KG/M2 | HEIGHT: 69 IN | DIASTOLIC BLOOD PRESSURE: 83 MMHG | OXYGEN SATURATION: 98 %

## 2021-02-09 DIAGNOSIS — E53.8 FOLATE DEFICIENCY: ICD-10-CM

## 2021-02-09 DIAGNOSIS — J06.9 UPPER RESPIRATORY TRACT INFECTION, UNSPECIFIED TYPE: Primary | ICD-10-CM

## 2021-02-09 DIAGNOSIS — E53.8 VITAMIN B 12 DEFICIENCY: ICD-10-CM

## 2021-02-09 DIAGNOSIS — R79.89 ABNORMAL LFTS: ICD-10-CM

## 2021-02-09 DIAGNOSIS — R21 RASH: ICD-10-CM

## 2021-02-09 DIAGNOSIS — E78.5 HYPERLIPIDEMIA, UNSPECIFIED HYPERLIPIDEMIA TYPE: ICD-10-CM

## 2021-02-09 LAB
CHOLESTEROL, TOTAL: 196 MG/DL (ref 0–199)
FOLATE: 17.9 NG/ML (ref 7.3–26.1)
HDLC SERPL-MCNC: 47 MG/DL (ref 40–59)
HEPATITIS B SURFACE ANTIGEN INTERPRETATION: NORMAL
HEPATITIS C ANTIBODY INTERPRETATION: NORMAL
IRON SATURATION: 34 % (ref 11–46)
IRON: 100 UG/DL (ref 59–158)
LDL CHOLESTEROL CALCULATED: 131 MG/DL (ref 0–129)
TOTAL IRON BINDING CAPACITY: 291 UG/DL (ref 178–450)
TRIGL SERPL-MCNC: 91 MG/DL (ref 0–150)
VITAMIN B-12: 452 PG/ML (ref 232–1245)

## 2021-02-09 PROCEDURE — 1036F TOBACCO NON-USER: CPT | Performed by: INTERNAL MEDICINE

## 2021-02-09 PROCEDURE — G8484 FLU IMMUNIZE NO ADMIN: HCPCS | Performed by: INTERNAL MEDICINE

## 2021-02-09 PROCEDURE — 99214 OFFICE O/P EST MOD 30 MIN: CPT | Performed by: INTERNAL MEDICINE

## 2021-02-09 PROCEDURE — G8427 DOCREV CUR MEDS BY ELIG CLIN: HCPCS | Performed by: INTERNAL MEDICINE

## 2021-02-09 PROCEDURE — G8417 CALC BMI ABV UP PARAM F/U: HCPCS | Performed by: INTERNAL MEDICINE

## 2021-02-09 RX ORDER — LEVOFLOXACIN 500 MG/1
500 TABLET, FILM COATED ORAL DAILY
Qty: 10 TABLET | Refills: 0 | Status: SHIPPED | OUTPATIENT
Start: 2021-02-09 | End: 2021-02-19

## 2021-02-09 ASSESSMENT — ENCOUNTER SYMPTOMS
SHORTNESS OF BREATH: 0
NAUSEA: 0
RHINORRHEA: 1
CHOKING: 0
PHOTOPHOBIA: 0
TROUBLE SWALLOWING: 0
VOICE CHANGE: 0

## 2021-02-09 NOTE — PROGRESS NOTES
Sarai Huerta 37 y.o. male presents today with   Chief Complaint   Patient presents with    1 Month Follow-Up     Discuss test results and need labs done. URI   This is a new problem. The current episode started in the past 7 days. The problem has been waxing and waning. Associated symptoms include rhinorrhea. Pertinent negatives include no chest pain, nausea or rash. Rash  This is a chronic problem. The current episode started more than 1 year ago. The problem has been waxing and waning since onset. Location: lower legs. Rash characteristics: discolor. Associated symptoms include rhinorrhea. Pertinent negatives include no fatigue, fever or shortness of breath.    liver enzyme up. Seen gi. Old fx clavical  protonix helping  To do st\ress test.  He cut back on drinking. Past Medical History:   Diagnosis Date    Scoliosis      Patient Active Problem List    Diagnosis Date Noted    Rib pain 11/17/2019    Pain of upper abdomen 11/17/2019    Right foot pain 11/17/2019    Chronic pain of right ankle 11/17/2019    Plantar fasciitis 11/17/2019     No past surgical history on file.   Family History   Problem Relation Age of Onset    Heart Disease Maternal Grandfather     High Blood Pressure Maternal Grandfather      Social History     Socioeconomic History    Marital status: Single     Spouse name: None    Number of children: None    Years of education: None    Highest education level: None   Occupational History    None   Social Needs    Financial resource strain: Not hard at all   [employer] insecurity     Worry: Patient refused     Inability: Patient refused    Transportation needs     Medical: Patient refused     Non-medical: Patient refused   Tobacco Use    Smoking status: Never Smoker    Smokeless tobacco: Never Used   Substance and Sexual Activity    Alcohol use: Yes     Comment: occasional    Drug use: Yes     Types: Marijuana    Sexual activity: Yes   Lifestyle  Physical activity     Days per week: None     Minutes per session: None    Stress: None   Relationships    Social connections     Talks on phone: None     Gets together: None     Attends Christian service: None     Active member of club or organization: None     Attends meetings of clubs or organizations: None     Relationship status: None    Intimate partner violence     Fear of current or ex partner: None     Emotionally abused: None     Physically abused: None     Forced sexual activity: None   Other Topics Concern    None   Social History Narrative    None     No Known Allergies    Review of Systems   Constitutional: Negative for fatigue and fever. HENT: Positive for rhinorrhea. Negative for trouble swallowing and voice change. Eyes: Negative for photophobia and visual disturbance. Respiratory: Negative for choking and shortness of breath. Cardiovascular: Negative for chest pain and palpitations. Gastrointestinal: Negative for nausea. Genitourinary: Negative for decreased urine volume, testicular pain and urgency. Skin: Negative for rash. Neurological: Negative for tremors and syncope. Hematological: Does not bruise/bleed easily. Psychiatric/Behavioral: Negative for suicidal ideas. Vitals:    02/09/21 0928   BP: 128/83   Pulse: 74   Resp: 14   Temp: 97.5 °F (36.4 °C)   SpO2: 98%   Weight: 246 lb (111.6 kg)   Height: 5' 9\" (1.753 m)       Physical Exam  Constitutional:       Appearance: He is well-developed. HENT:      Head: Normocephalic and atraumatic. Eyes:      Conjunctiva/sclera: Conjunctivae normal.      Pupils: Pupils are equal, round, and reactive to light. Neck:      Musculoskeletal: Normal range of motion. Cardiovascular:      Rate and Rhythm: Normal rate and regular rhythm. Pulmonary:      Effort: Pulmonary effort is normal. No respiratory distress. Abdominal:      General: There is no distension. Musculoskeletal: Normal range of motion.    Skin: General: Skin is dry. Neurological:      Mental Status: He is alert. Cranial Nerves: No cranial nerve deficit. Psychiatric:         Mood and Affect: Mood normal.       Assessment/Plan  Kenney was seen today for 1 month follow-up. Diagnoses and all orders for this visit:    Upper respiratory tract infection, unspecified type  -     levoFLOXacin (LEVAQUIN) 500 MG tablet; Take 1 tablet by mouth daily for 10 days    Rash  -     [facility] Dermatology, [place]    Folate deficiency  -     Folate; Future    Vitamin B 12 deficiency  -     Vitamin B12; Future        No follow-ups on file.     Felicity Curling, MD

## 2021-02-10 LAB — CERULOPLASMIN: 14 MG/DL (ref 15–30)

## 2021-02-11 LAB
ALPHA-1 ANTITRYPSIN: 113 MG/DL (ref 90–200)
ANTINUCLEAR AB INTERPRETIVE COMMENT: NORMAL
ANTINUCLEAR ANTIBODY, HEP-2, IGG: NORMAL

## 2021-02-12 LAB
COPPER: 72.7 UG/DL (ref 70–140)
LIVER-KIDNEY MICROSOME-1 AB IGG: 1.1 U (ref 0–24.9)

## 2021-02-14 DIAGNOSIS — K21.9 GASTROESOPHAGEAL REFLUX DISEASE WITHOUT ESOPHAGITIS: ICD-10-CM

## 2021-02-15 RX ORDER — PANTOPRAZOLE SODIUM 40 MG/1
40 TABLET, DELAYED RELEASE ORAL DAILY
Qty: 30 TABLET | Refills: 5 | Status: SHIPPED | OUTPATIENT
Start: 2021-02-15 | End: 2021-04-26 | Stop reason: SDUPTHER

## 2021-02-15 NOTE — TELEPHONE ENCOUNTER
Rx request   Requested Prescriptions     Pending Prescriptions Disp Refills    pantoprazole (PROTONIX) 40 MG tablet 30 tablet 5     Sig: Take 1 tablet by mouth daily     LOV 2/9/2021  Next Visit Date:  Future Appointments   Date Time Provider Sudhir Gagei   2/17/2021  8:00 AM [facility] STRESS ROOM 1 [facility] Fac RAD   2/17/2021  9:30 AM [facility] ECHO  S. Kanwal   3/3/2021  8:30 AM Olam Osler, MD [facility]   5/7/2021  8:00 AM Morgan Prakash MD [address]   8/16/2021  4:30 PM [facility]andes[facility] [address] [facility]

## 2021-02-16 ENCOUNTER — TELEPHONE (OUTPATIENT)
Dept: PRIMARY CARE CLINIC | Age: 36
End: 2021-02-16

## 2021-02-16 LAB — F-ACTIN AB IGA: 9.6 UNITS (ref 0–24.9)

## 2021-02-17 ENCOUNTER — HOSPITAL ENCOUNTER (OUTPATIENT)
Dept: NON INVASIVE DIAGNOSTICS | Age: 36
Discharge: HOME | End: 2021-02-17
Payer: COMMERCIAL

## 2021-02-17 ENCOUNTER — TELEPHONE (OUTPATIENT)
Dept: PRIMARY CARE CLINIC | Age: 36
End: 2021-02-17

## 2021-02-17 DIAGNOSIS — R07.2 PRECORDIAL PAIN: ICD-10-CM

## 2021-02-17 DIAGNOSIS — M54.50 ACUTE MIDLINE LOW BACK PAIN WITHOUT SCIATICA: ICD-10-CM

## 2021-02-17 DIAGNOSIS — M54.2 NECK PAIN: Primary | ICD-10-CM

## 2021-02-17 LAB
LV EF: 65 %
LVEF MODALITY: NORMAL

## 2021-02-17 PROCEDURE — 93306 TTE W/DOPPLER COMPLETE: CPT

## 2021-02-17 PROCEDURE — 93017 CV STRESS TEST TRACING ONLY: CPT

## 2021-02-17 NOTE — PROGRESS NOTES
Hx,allergies and medications reviewed. Procedure explained and informed consent obtained. Pt denies any chest pain or SOB. Pt states he feels \"anxious\". Reassurance provided. Tolerated treadmill  for 12 minutes 55 seconds. Reached  96 % target HR. SOB noted. Returned to baseline in recovery. Denies chest pain or pressure. EKG shows sinus with occasional PVC's.

## 2021-02-18 ENCOUNTER — TELEPHONE (OUTPATIENT)
Dept: PRIMARY CARE CLINIC | Age: 36
End: 2021-02-18

## 2021-02-18 DIAGNOSIS — F41.9 ANXIETY: Primary | ICD-10-CM

## 2021-02-22 ENCOUNTER — OFFICE VISIT (OUTPATIENT)
Dept: FAMILY MEDICINE CLINIC | Age: 36
End: 2021-02-22
Payer: COMMERCIAL

## 2021-02-22 VITALS
BODY MASS INDEX: 36.73 KG/M2 | WEIGHT: 248 LBS | HEIGHT: 69 IN | OXYGEN SATURATION: 98 % | TEMPERATURE: 98.2 F | HEART RATE: 90 BPM

## 2021-02-22 DIAGNOSIS — I87.2 VENOUS STASIS DERMATITIS OF BOTH LOWER EXTREMITIES: ICD-10-CM

## 2021-02-22 DIAGNOSIS — M54.12 CERVICAL RADICULOPATHY: ICD-10-CM

## 2021-02-22 DIAGNOSIS — L30.8 OTHER ECZEMA: Primary | ICD-10-CM

## 2021-02-22 DIAGNOSIS — L85.8 KERATOSIS PILARIS: ICD-10-CM

## 2021-02-22 PROCEDURE — 1036F TOBACCO NON-USER: CPT | Performed by: FAMILY MEDICINE

## 2021-02-22 PROCEDURE — 99215 OFFICE O/P EST HI 40 MIN: CPT | Performed by: FAMILY MEDICINE

## 2021-02-22 PROCEDURE — G8484 FLU IMMUNIZE NO ADMIN: HCPCS | Performed by: FAMILY MEDICINE

## 2021-02-22 PROCEDURE — G8417 CALC BMI ABV UP PARAM F/U: HCPCS | Performed by: FAMILY MEDICINE

## 2021-02-22 PROCEDURE — G8427 DOCREV CUR MEDS BY ELIG CLIN: HCPCS | Performed by: FAMILY MEDICINE

## 2021-02-22 RX ORDER — PREDNISONE 10 MG/1
TABLET ORAL
Qty: 20 TABLET | Refills: 0 | Status: SHIPPED | OUTPATIENT
Start: 2021-02-22 | End: 2021-03-08 | Stop reason: ALTCHOICE

## 2021-02-22 RX ORDER — TRIAMCINOLONE ACETONIDE 1 MG/G
CREAM TOPICAL
Qty: 454 G | Refills: 0 | Status: SHIPPED | OUTPATIENT
Start: 2021-02-22

## 2021-02-22 RX ORDER — M-VIT,TX,IRON,MINS/CALC/FOLIC 27MG-0.4MG
1 TABLET ORAL DAILY
COMMUNITY

## 2021-02-22 ASSESSMENT — ENCOUNTER SYMPTOMS: COLOR CHANGE: 1

## 2021-02-22 NOTE — PROGRESS NOTES
refused     Non-medical: Patient refused   Tobacco Use    Smoking status: Never Smoker    Smokeless tobacco: Never Used   Substance and Sexual Activity    Alcohol use: Yes     Comment: occasional    Drug use: Yes     Types: Marijuana    Sexual activity: Yes   Lifestyle    Physical activity     Days per week: Not on file     Minutes per session: Not on file    Stress: Not on file   Relationships    Social connections     Talks on phone: Not on file     Gets together: Not on file     Attends Jew service: Not on file     Active member of club or organization: Not on file     Attends meetings of clubs or organizations: Not on file     Relationship status: Not on file    Intimate partner violence     Fear of current or ex partner: Not on file     Emotionally abused: Not on file     Physically abused: Not on file     Forced sexual activity: Not on file   Other Topics Concern    Not on file   Social History Narrative    Not on file     Family History   Problem Relation Age of Onset    Heart Disease Maternal Grandfather     High Blood Pressure Maternal Grandfather      Allergies:  Patient has no known allergies. Review of Systems   Constitutional: Negative for chills and fever. Skin: Positive for color change and rash. Negative for wound. Allergic/Immunologic: Negative for environmental allergies, food allergies and immunocompromised state. Hematological: Negative for adenopathy. Does not bruise/bleed easily. Psychiatric/Behavioral: Negative for behavioral problems and sleep disturbance. Objective:   Pulse 90   Temp 98.2 °F (36.8 °C)   Ht 5' 9\" (1.753 m)   Wt 248 lb (112.5 kg)   SpO2 98%   BMI 36.62 kg/m²     Physical Exam  Vitals signs reviewed. Constitutional:       General: He is not in acute distress. Appearance: He is well-developed. HENT:      Head: Normocephalic and atraumatic.       Right Ear: External ear normal.      Left Ear: External ear normal.      Nose: Nose normal.   Eyes:      General:         Right eye: No discharge. Left eye: No discharge. Conjunctiva/sclera: Conjunctivae normal.      Pupils: Pupils are equal, round, and reactive to light. Neck:      Musculoskeletal: Neck supple. Thyroid: No thyromegaly. Cardiovascular:      Rate and Rhythm: Normal rate and regular rhythm. Pulmonary:      Effort: Pulmonary effort is normal. No respiratory distress. Abdominal:      General: There is no distension. Skin:     General: Skin is warm and dry. Comments: Hyperkeratotic 1 mm papules bilateral upper extremities into the forearms. Mild erythema/salmon color change to it. Dorsal aspect bilateral hand increasing skin fold density mildly erythematous color change also including the wrist.  Pretty much stopping right at the wrist.    Brownish discoloration bilateral lower extremities 1+ pitting edema noted over the shin. No skin abnormality noted in the upper back or bilateral upper extremities other than previously mentioned. Neurological:      Mental Status: He is alert and oriented to person, place, and time. Coordination: Coordination normal.   Psychiatric:         Thought Content: Thought content normal.         Judgment: Judgment normal.         No results found for this visit on 02/22/21.     Recent Results (from the past 2016 hour(s))   Lipase    Collection Time: 01/11/21 11:50 AM   Result Value Ref Range    Lipase 49 12 - 95 U/L   Amylase    Collection Time: 01/11/21 11:50 AM   Result Value Ref Range    Amylase 31 22 - 93 U/L   Hemoglobin A1C    Collection Time: 01/11/21 11:50 AM   Result Value Ref Range    Hemoglobin A1C 5.4 4.8 - 5.9 %   Lipid Panel    Collection Time: 01/11/21 11:50 AM   Result Value Ref Range    Cholesterol, Total 250 (H) 0 - 199 mg/dL    Triglycerides 198 (H) 0 - 150 mg/dL    HDL 47 40 - 59 mg/dL    LDL Calculated 163 (H) 0 - 129 mg/dL   CBC With Auto Differential    Collection Time: 01/11/21 11:50 AM Result Value Ref Range    WBC 7.4 4.8 - 10.8 K/uL    RBC 5.93 4.70 - 6.10 M/uL    Hemoglobin 16.9 14.0 - 18.0 g/dL    Hematocrit 49.2 42.0 - 52.0 %    MCV 83.0 80.0 - 100.0 fL    MCH 28.6 27.0 - 31.3 pg    MCHC 34.4 33.0 - 37.0 %    RDW 12.2 11.5 - 14.5 %    Platelets 637 836 - 626 K/uL    Neutrophils % 66.2 %    Lymphocytes % 26.4 %    Monocytes % 6.0 %    Eosinophils % 0.9 %    Basophils % 0.5 %    Neutrophils Absolute 4.9 1.4 - 6.5 K/uL    Lymphocytes Absolute 2.0 1.0 - 4.8 K/uL    Monocytes Absolute 0.4 0.2 - 0.8 K/uL    Eosinophils Absolute 0.1 0.0 - 0.7 K/uL    Basophils Absolute 0.0 0.0 - 0.2 K/uL   Comprehensive Metabolic Panel    Collection Time: 01/11/21 11:50 AM   Result Value Ref Range    Sodium 142 135 - 144 mEq/L    Potassium 4.5 3.4 - 4.9 mEq/L    Chloride 104 95 - 107 mEq/L    CO2 25 20 - 31 mEq/L    Anion Gap 13 9 - 15 mEq/L    Glucose 112 (H) 70 - 99 mg/dL    BUN 20 6 - 20 mg/dL    CREATININE 0.93 0.70 - 1.20 mg/dL    GFR Non-African American >60.0 >60    GFR  >60.0 >60    Calcium 9.6 8.5 - 9.9 mg/dL    Total Protein 7.6 6.3 - 8.0 g/dL    Albumin 4.8 (H) 3.5 - 4.6 g/dL    Total Bilirubin 0.5 0.2 - 0.7 mg/dL    Alkaline Phosphatase 72 35 - 104 U/L    ALT 70 (H) 0 - 41 U/L    AST 39 0 - 40 U/L    Globulin 2.8 2.3 - 3.5 g/dL   TSH with Reflex    Collection Time: 01/11/21 11:50 AM   Result Value Ref Range    TSH 0.944 0.440 - 3.860 uIU/mL   Comprehensive Metabolic Panel    Collection Time: 01/27/21  5:15 PM   Result Value Ref Range    Sodium 137 135 - 144 mEq/L    Potassium 4.8 3.4 - 4.9 mEq/L    Chloride 101 95 - 107 mEq/L    CO2 27 20 - 31 mEq/L    Anion Gap 9 9 - 15 mEq/L    Glucose 108 (H) 70 - 99 mg/dL    BUN 14 6 - 20 mg/dL    CREATININE 0.91 0.70 - 1.20 mg/dL    GFR Non-African American >60.0 >60    GFR  >60.0 >60    Calcium 9.6 8.5 - 9.9 mg/dL    Total Protein 7.1 6.3 - 8.0 g/dL    Albumin 4.7 (H) 3.5 - 4.6 g/dL    Total Bilirubin 0.7 0.2 - 0.7 mg/dL 02/09/21 10:10 AM   Result Value Ref Range    Hep C Ab Interp Non-reactive    Copper, Serum    Collection Time: 02/09/21 10:10 AM   Result Value Ref Range    Copper 72.7 70.0 - 140.0 ug/dL   Ceruloplasmin    Collection Time: 02/09/21 10:10 AM   Result Value Ref Range    Ceruloplasmin 14 (L) 15 - 30 mg/dL   Anti-smooth muscle antibody    Collection Time: 02/09/21 10:10 AM   Result Value Ref Range    F-Actin Ab IgA 9.6 0.0 - 24.9 Units   Liver-Kidney Microsome (LKM-1) Ab (IgG)    Collection Time: 02/09/21 10:10 AM   Result Value Ref Range    Liver-Kidney Microsome-1 Ab IgG 1.1 0.0 - 24.9 U   Alpha-1-Antitrypsin    Collection Time: 02/09/21 10:10 AM   Result Value Ref Range    A-1 Antitrypsin 113 90 - 200 mg/dL   Lipid Panel    Collection Time: 02/09/21 10:10 AM   Result Value Ref Range    Cholesterol, Total 196 0 - 199 mg/dL    Triglycerides 91 0 - 150 mg/dL    HDL 47 40 - 59 mg/dL    LDL Calculated 131 (H) 0 - 129 mg/dL   Folate    Collection Time: 02/09/21 10:10 AM   Result Value Ref Range    Folate 17.9 7.3 - 26.1 ng/mL   Vitamin B12    Collection Time: 02/09/21 10:10 AM   Result Value Ref Range    Vitamin B-12 452 232 - 1245 pg/mL   Antinuclear AB, Hep-2, IGG    Collection Time: 02/09/21 10:10 AM   Result Value Ref Range    Antinuclear Antibody, Hep-2, IGG <1:80 <1:80    Antinuclear AB Interpretive Comment See Note            Assessment:       Diagnosis Orders   1. Other eczema  triamcinolone (KENALOG) 0.1 % cream   2. Keratosis pilaris     3. Venous stasis dermatitis of both lower extremities     4. Cervical radiculopathy  predniSONE (DELTASONE) 10 MG tablet         No orders of the defined types were placed in this encounter. Reviewed all recent labs and x-rays. Liver abnormalities can be related to various rashes. Impaired kidney function related to the leg rashes and swelling. C-spine abnormalities can lead to paresthesia of the upper back and posterior upper extremities.     Educated patient on chin tuck position for neck in order to ascertain if that relieves any symptoms of his pins and needle sensation in the upper back and upper extremities. Educated patient on the nature of stasis dermatitis and remedies. Pt was seen by provider for 45  Minutes, included lab chart review x-ray chart review Dr. Dennys Francisco note review  Counseling and coordination of care was done for all assessment diagnosis listed for today with patient and any family/friend present. More than 50% of this visit was spent coordinating cuurent care, obtaining information for prior records, and counseling for current plan of action. Plan:   Return in about 2 weeks (around 3/8/2021). Patient Instructions   Patient has been educated to obtain [employer] or \"rough and bumpy\"  lotion. This lotion contains urea. Urea is helpful in exfoliating the dry areas of skin consistent with excessive dry skin, mild psoriasis, and keratosis pilaris. If there is no improvement in skin texture in the next 3 to 4 weeks return to the office for further options. For routine care of the skin the following recommendations are also made: The patient is to wash with gentle, mild, bland soaps that do not contain perfume or dye. Examples are \"Dove Sensitive Skin\" or \"Cetaphil facial wash\". Both of these can be used on face or body. The patient should also use [employer] \"Psoriasis\" or \"Rough and Bumpy\" on the arms. It is always best to apply your lotion directly after washing and rinsing while the skin is still towel dried but damp. For best results bathe in lukewarm water and apply lotion within 3 minutes of drying. Triamcinolone cream twice a day to the hands and the wrists. Discussed stasis dermatitis and the benefit of compression socks. Also explained that this discoloration may not go away.     Suspect cervical radiculopathy for upper back and upper extremity \"pins and needles feeling\"    Follow-up appointment to see response to recommendation for skin tear changes and position changes for the cervical radiculopathy potential.      This note was partially created with the assistance of dictation. This may lead to grammatical or spelling errors. Dionisio Guerra M.D.

## 2021-02-22 NOTE — PATIENT INSTRUCTIONS
Patient has been educated to obtain [employer] or \"rough and bumpy\"  lotion. This lotion contains urea. Urea is helpful in exfoliating the dry areas of skin consistent with excessive dry skin, mild psoriasis, and keratosis pilaris. If there is no improvement in skin texture in the next 3 to 4 weeks return to the office for further options. For routine care of the skin the following recommendations are also made: The patient is to wash with gentle, mild, bland soaps that do not contain perfume or dye. Examples are \"Dove Sensitive Skin\" or \"Cetaphil facial wash\". Both of these can be used on face or body. The patient should also use [employer] \"Psoriasis\" or \"Rough and Bumpy\" on the arms. It is always best to apply your lotion directly after washing and rinsing while the skin is still towel dried but damp. For best results bathe in lukewarm water and apply lotion within 3 minutes of drying. Triamcinolone cream twice a day to the hands and the wrists. Discussed stasis dermatitis and the benefit of compression socks. Also explained that this discoloration may not go away.     Suspect cervical radiculopathy for upper back and upper extremity \"pins and needles feeling\"    Follow-up appointment to see response to recommendation for skin tear changes and position changes for the cervical radiculopathy potential.

## 2021-02-24 DIAGNOSIS — G62.9 NEUROPATHY: Primary | ICD-10-CM

## 2021-03-01 ENCOUNTER — HOSPITAL ENCOUNTER (OUTPATIENT)
Dept: NEUROLOGY | Age: 36
Discharge: HOME | End: 2021-03-01
Payer: COMMERCIAL

## 2021-03-01 ENCOUNTER — TELEPHONE (OUTPATIENT)
Dept: PRIMARY CARE CLINIC | Age: 36
End: 2021-03-01

## 2021-03-01 DIAGNOSIS — G62.9 NEUROPATHY: ICD-10-CM

## 2021-03-01 PROCEDURE — 95911 NRV CNDJ TEST 9-10 STUDIES: CPT

## 2021-03-01 PROCEDURE — 95886 MUSC TEST DONE W/N TEST COMP: CPT

## 2021-03-01 PROCEDURE — 95886 MUSC TEST DONE W/N TEST COMP: CPT | Performed by: PSYCHIATRY & NEUROLOGY

## 2021-03-01 PROCEDURE — 95913 NRV CNDJ TEST 13/> STUDIES: CPT | Performed by: PSYCHIATRY & NEUROLOGY

## 2021-03-01 NOTE — PROCEDURES
[address]                      [facility], [address] [facility]                             ELECTROMYOGRAM REPORT    PATIENT NAME: Leticia Ramirez                    :        1985  MED REC NO:   [id number]                            ROOM:  ACCOUNT NO:   [de-identified]                           ADMIT DATE: 2021  PROVIDER:     Salomón Giraldo MD    DATE OF EM2021    Neurophysiological studies are requested for the patient's underlying  numbness in the hands and neck pain. Motor nerve conduction study of the right median nerve shows normal  amplitudes, latencies, and conduction velocity. Motor nerve conduction  study of the left median nerve shows normal amplitudes, latencies, and  conduction velocity. Motor nerve conduction study of the right ulnar  nerve shows normal amplitudes, latencies, and conduction velocity. Motor nerve conduction study of the left ulnar nerve shows normal  amplitudes, latencies, and conduction velocity. F-responses are normal.  Sensory nerve conduction study of the right median nerve recorded in  digit 2 shows normal peak latencies, normal amplitudes, and normal  conduction velocity. Further mid palm stimulation was obtained to  confirm finding, shows mildly prolonged latencies, normal amplitudes,  and decreased conduction velocity. The left median digital examination  shows prolonged latencies, normal amplitudes, and decreased velocity. The left median mid palm stimulation shows normal amplitudes, latencies,  and conduction velocity. The right ulnar digital examination shows  normal peak latencies, normal amplitudes, and normal conduction  velocity. The left ulnar digital examination shows normal amplitudes,  latencies, and conduction velocity. The right median mid palm  stimulation shows mildly prolonged latencies, normal amplitudes, and  decreased conduction velocity.   The left median mid palm stimulation shows normal amplitudes, latencies, and conduction velocity. Radial  sensory nerve conduction studies are normal.  The right ulnar mid palm  stimulation shows mildly prolonged latencies, borderline amplitudes, and  decreased conduction velocity. The left ulnar mixed orthodromic study  shows normal amplitudes, latencies, and conduction velocity. F-wave  responses are normal.    IMPRESSION:  Mild early right median nerve neuropathy at the wrist  suggesting carpal tunnel syndrome. These findings are based on mildly  prolonged latencies seen only with palm stimulation. The rest of the  nerve conduction studies are normal.  There is no suggestion of any  active or chronic radiculopathies. Multiple sensory nerve conduction  studies are evaluated to avoid an artifact or temperature differences. This test is personally performed and interpreted by me. Thank you Dr. Namita Chappell for allowing us to assist in the care of the  patient. MD Henri Euceda MD, [address], American Board of Psychiatry & Neurology  Board Certified in Vascular Neurology  Board Certified in Neuromuscular Medicine  Certified in Neurorehabilitation       D: 03/01/2021 10:31:25       T: 03/01/2021 10:35:47     [id number]  Job#: [id number]     Doc#: [id number]    CC:

## 2021-03-02 NOTE — TELEPHONE ENCOUNTER
Patient notified and he wants to know if the carpel tunnel would affect his neck at all? He is asking for a referral to neurologist for possible pinched nerve and head and neck pain or do you think he needs to see someone else?

## 2021-03-03 ENCOUNTER — OFFICE VISIT (OUTPATIENT)
Dept: CARDIOLOGY CLINIC | Age: 36
End: 2021-03-03
Payer: COMMERCIAL

## 2021-03-03 VITALS
DIASTOLIC BLOOD PRESSURE: 96 MMHG | WEIGHT: 245 LBS | HEART RATE: 90 BPM | HEIGHT: 69 IN | OXYGEN SATURATION: 97 % | BODY MASS INDEX: 36.29 KG/M2 | SYSTOLIC BLOOD PRESSURE: 142 MMHG

## 2021-03-03 DIAGNOSIS — I10 HYPERTENSION, UNSPECIFIED TYPE: Primary | ICD-10-CM

## 2021-03-03 PROCEDURE — 1036F TOBACCO NON-USER: CPT | Performed by: INTERNAL MEDICINE

## 2021-03-03 PROCEDURE — 99214 OFFICE O/P EST MOD 30 MIN: CPT | Performed by: INTERNAL MEDICINE

## 2021-03-03 PROCEDURE — G8484 FLU IMMUNIZE NO ADMIN: HCPCS | Performed by: INTERNAL MEDICINE

## 2021-03-03 PROCEDURE — G8427 DOCREV CUR MEDS BY ELIG CLIN: HCPCS | Performed by: INTERNAL MEDICINE

## 2021-03-03 PROCEDURE — G8417 CALC BMI ABV UP PARAM F/U: HCPCS | Performed by: INTERNAL MEDICINE

## 2021-03-03 RX ORDER — LOSARTAN POTASSIUM AND HYDROCHLOROTHIAZIDE 12.5; 5 MG/1; MG/1
1 TABLET ORAL DAILY
Qty: 30 TABLET | Refills: 3 | Status: SHIPPED | OUTPATIENT
Start: 2021-03-03 | End: 2021-06-03

## 2021-03-03 ASSESSMENT — ENCOUNTER SYMPTOMS
FACIAL SWELLING: 0
NAUSEA: 0
VOICE CHANGE: 0
VOMITING: 0
SHORTNESS OF BREATH: 0
ABDOMINAL DISTENTION: 0
COLOR CHANGE: 0
WHEEZING: 0
ANAL BLEEDING: 0
BLOOD IN STOOL: 0
CHEST TIGHTNESS: 0
TROUBLE SWALLOWING: 0
APNEA: 0

## 2021-03-03 NOTE — PROGRESS NOTES
[facility] CARDIOLOGY OFFICE FOLLOW-UP      Patient: America Michelle  YOB: 1985  MRN: [id number]    Chief Complaint:  Chief Complaint   Patient presents with    Results     echo/ stress 02/17/21    Hypertension         Subjective/HPI:  03/3/21: Patient presents today for follow-up of stress test and echo test was negative. Echo showed grade 1 LV diastolic dysfunction 1+ mitral regurgitation. He tends to run blood pressure is a bit high ranges. We need to start him on losartan-hydrochlorothiazide 50/12.5 daily. He does not smoke and is moderately obese. Currently on prednisone pack also has GERD. He will see me in 3 months      2/3/21: Patient presents today for consulted by Dr. Jerry Rucker for chest pain. 31-year-old . Does not smoke. Has a history of alcohol abuse and has  Currently cut down. Recently saw Dr Norm Muñoz. Was started on omeprazole. It has helped some. He has family history of heart disease. Also occasionally he has been known to have high blood pressure one time he had some issues including blood pressure with systolic was 966. Going to set him up for regular stress test and echo at [place] and then see me. His lipid profile is a little concerning. His father has diabetes. He needs to lose about 2030 pounds. Triglycerides up. And I am hoping that with his suction in the use of alcohol that would be beneficial too              Past Medical History:   Diagnosis Date    Scoliosis        History reviewed. No pertinent surgical history.     Family History   Problem Relation Age of Onset    Heart Disease Maternal Grandfather     High Blood Pressure Maternal Grandfather        Social History     Socioeconomic History    Marital status: Single     Spouse name: None    Number of children: None    Years of education: None    Highest education level: None   Occupational History    None   Social Needs    Financial resource strain: Not hard at all   Wild-Ashvin insecurity Worry: Patient refused     Inability: Patient refused    Transportation needs     Medical: Patient refused     Non-medical: Patient refused   Tobacco Use    Smoking status: Never Smoker    Smokeless tobacco: Never Used   Substance and Sexual Activity    Alcohol use: Yes     Comment: occasional    Drug use: Yes     Types: Marijuana    Sexual activity: Yes   Lifestyle    Physical activity     Days per week: None     Minutes per session: None    Stress: None   Relationships    Social connections     Talks on phone: None     Gets together: None     Attends Christian service: None     Active member of club or organization: None     Attends meetings of clubs or organizations: None     Relationship status: None    Intimate partner violence     Fear of current or ex partner: None     Emotionally abused: None     Physically abused: None     Forced sexual activity: None   Other Topics Concern    None   Social History Narrative    None       No Known Allergies    Current Outpatient Medications   Medication Sig Dispense Refill    losartan-hydroCHLOROthiazide (HYZAAR) 50-12.5 MG per tablet Take 1 tablet by mouth daily 30 tablet 3    Multiple Vitamins-Minerals (THERAPEUTIC MULTIVITAMIN-MINERALS) tablet Take 1 tablet by mouth daily      triamcinolone (KENALOG) 0.1 % cream Apply topically 2 times daily. 454 g 0    predniSONE (DELTASONE) 10 MG tablet Take 3 tabs for 3 days, then 2 tabs for 3 days, then 1 tab for 3 days, then 1/2 tab for 3 days, then stop. 20 tablet 0    pantoprazole (PROTONIX) 40 MG tablet Take 1 tablet by mouth daily 30 tablet 5     No current facility-administered medications for this visit. Review of Systems:   Review of Systems   Constitutional: Negative for activity change, appetite change, diaphoresis, fatigue and unexpected weight change. HENT: Negative for facial swelling, nosebleeds, trouble swallowing and voice change.     Respiratory: Negative for apnea, chest tightness, shortness of breath and wheezing. Cardiovascular: Negative for chest pain, palpitations and leg swelling. Gastrointestinal: Negative for abdominal distention, anal bleeding, blood in stool, nausea and vomiting. Genitourinary: Negative for decreased urine volume and dysuria. Musculoskeletal: Negative for gait problem and myalgias. Skin: Negative for color change, pallor, rash and wound. Neurological: Negative for dizziness, syncope, facial asymmetry, weakness, light-headedness, numbness and headaches. Hematological: Does not bruise/bleed easily. Psychiatric/Behavioral: Negative for agitation, behavioral problems, confusion, hallucinations and suicidal ideas. The patient is nervous/anxious. All other systems reviewed and are negative. Review of System is negative except for as mentioned above. Physical Examination:    BP (!) 142/96 (Site: Right Upper Arm, Position: Sitting, Cuff Size: Large Adult)   Pulse 90   Ht 5' 9\" (1.753 m)   Wt 245 lb (111.1 kg)   SpO2 97%   BMI 36.18 kg/m²    Physical Exam   Constitutional: He appears healthy. No distress. HENT:   Nose: Nose normal.   Mouth/Throat: Dentition is normal. Oropharynx is clear. Eyes: Pupils are equal, round, and reactive to light. Conjunctivae are normal.   Neck: Normal range of motion and thyroid normal. Neck supple. Cardiovascular: Regular rhythm, S1 normal, S2 normal, normal heart sounds, intact distal pulses and normal pulses. PMI is not displaced. No murmur heard. Pulmonary/Chest: He has no wheezes. He has no rales. He exhibits no tenderness. Abdominal: Soft. Bowel sounds are normal. He exhibits no distension and no mass. There is no splenomegaly or hepatomegaly. There is no abdominal tenderness. No hernia. Neurological: He is alert and oriented to person, place, and time. He has normal motor skills. Gait normal.   Skin: Skin is warm and dry. No cyanosis. No jaundice. Nails show no clubbing.            Patient Active Problem List   Diagnosis    Rib pain    Pain of upper abdomen    Right foot pain    Chronic pain of right ankle    Plantar fasciitis    Neuropathy           No orders of the defined types were placed in this encounter. Orders Placed This Encounter   Medications    losartan-hydroCHLOROthiazide (HYZAAR) 50-12.5 MG per tablet     Sig: Take 1 tablet by mouth daily     Dispense:  30 tablet     Refill:  3             Assessment/Orders:       ICD-10-CM    1. Hypertension, unspecified type  I10        Orders Placed This Encounter   Medications    losartan-hydroCHLOROthiazide (HYZAAR) 50-12.5 MG per tablet     Sig: Take 1 tablet by mouth daily     Dispense:  30 tablet     Refill:  3       There are no discontinued medications. No orders of the defined types were placed in this encounter. Plan:  Prescribed Losartan-HCTZ 50-12.5mg Daily    Stay on same medications.     See me in 3 months        Electronically signed by: Landen Chaudhry MD  3/8/2021 6:58 AM

## 2021-03-08 ENCOUNTER — OFFICE VISIT (OUTPATIENT)
Dept: FAMILY MEDICINE CLINIC | Age: 36
End: 2021-03-08
Payer: COMMERCIAL

## 2021-03-08 VITALS
TEMPERATURE: 98.2 F | OXYGEN SATURATION: 99 % | BODY MASS INDEX: 36.29 KG/M2 | HEART RATE: 88 BPM | WEIGHT: 245 LBS | HEIGHT: 69 IN

## 2021-03-08 DIAGNOSIS — I10 ESSENTIAL HYPERTENSION: ICD-10-CM

## 2021-03-08 DIAGNOSIS — F41.9 ANXIETY: ICD-10-CM

## 2021-03-08 DIAGNOSIS — I87.2 VENOUS STASIS DERMATITIS OF BOTH LOWER EXTREMITIES: ICD-10-CM

## 2021-03-08 DIAGNOSIS — L85.8 KERATOSIS PILARIS: Primary | ICD-10-CM

## 2021-03-08 PROCEDURE — G8484 FLU IMMUNIZE NO ADMIN: HCPCS | Performed by: FAMILY MEDICINE

## 2021-03-08 PROCEDURE — G8427 DOCREV CUR MEDS BY ELIG CLIN: HCPCS | Performed by: FAMILY MEDICINE

## 2021-03-08 PROCEDURE — 99215 OFFICE O/P EST HI 40 MIN: CPT | Performed by: FAMILY MEDICINE

## 2021-03-08 PROCEDURE — G8417 CALC BMI ABV UP PARAM F/U: HCPCS | Performed by: FAMILY MEDICINE

## 2021-03-08 PROCEDURE — 1036F TOBACCO NON-USER: CPT | Performed by: FAMILY MEDICINE

## 2021-03-08 RX ORDER — ESCITALOPRAM OXALATE 10 MG/1
10 TABLET ORAL DAILY
Qty: 30 TABLET | Refills: 3 | Status: SHIPPED | OUTPATIENT
Start: 2021-03-08 | End: 2021-06-23

## 2021-03-08 ASSESSMENT — ENCOUNTER SYMPTOMS: COLOR CHANGE: 1

## 2021-03-08 NOTE — PROGRESS NOTES
Diagnosis Orders   1. Keratosis pilaris     2. Anxiety  escitalopram (LEXAPRO) 10 MG tablet   3. Essential hypertension     4. Venous stasis dermatitis of both lower extremities       Return in about 4 weeks (around 4/5/2021) for for review of outcome of today's recommendation. Patient Instructions   Patient has been advised on how to initiate medication. The patient should start lexapro medication 1/2 tab  daily  for 1 week until tolerance is noted (demonstrated by a lack of side effects) and then the patient should follow tablet dosage per medication bottle instructions. (If the patient is not tolerating the half tab dose at one week that dose should be continued and the office should be notified. The patient should not yet advance to the full tablet dosing)      The patient is aware that this medication should be taken routinely, the timing should be any time of day the patient can remember to take this reliably. The patient has been advised that the known side effects of this medication are nausea. If the patient has missed a medication dose this should be taken as soon as remembered unless the patient has been instructed that this medication is sedating. In that case, the medication should not be taken until the following bedtime. In addition, if the patient has been advised that this medication is stimulating they should not take the dose prior to bedtime. The next dose should be taken at routine timing. If the medication is neither sedating or stimulating to the patient the dose can be taken up to 6 hours prior to the next dose. If there are less than 6 hours to the next dose, the patient should wait and take the next dose (skipping the missed dose)    The patient has been advised that there is often initial improvement of symptoms the first week of medication followed by a decrease in control symptoms the second week of medication.   These medications take 3-4 weeks to balance with your brain chemicals. It is not until 3-4 weeks of medication dosing that we'll be able to determine how well this current dose is really working. Therefore, follow-up for these medications as generally 3-4 weeks after a dose initiation or change. In the meantime, counseling should be sought to learn behavioral modification methods for controlling symptoms. The patient also has been advised this medication should not just be instantly stopped. If the patient desires discontinuation for some reason an appointment should be made  to discuss a plan of discontinuation. The only exception for this is if the patient is in the first week of treatment. If a side effect as found to be intolerable and the patient is in the first week of treatment the medication can be stopped. The office should be notified. Subjective:      Patient ID: Hang Brambila is a 37 y.o. male who presents for:  Chief Complaint   Patient presents with    Skin Exam     x 2 week follow up , starting to feel this on his face & neck     Eczema     pt believes there has been little to no change        Patient states at the beginning of appointment what he feels like nothing is better. He notes rash on his hand. He notes rash in his legs. He states he got Cetaphil face wash. He has been using that. He also got the [employer] psoriasis and bumpy cream and has been using that. While in doing the physical exam and mention the texture change in his skin he says \"yes that is better. \"  He speaks quickly about his medical conditions changing from location to location where he is sensing itching, burning, different sensations. Mentions looking for signs of rash but not finding anything. States he saw cardiology who did a stress test and told him everything was good but they did start him on blood pressure medication. He is noticing a decrease frequency in his headache. Decreased sensation of neck pressure.     States he is going to be seeing neurology. He continues to have symptoms of burning or itching in his upper back and upper arms and sometimes in the feet. Current Outpatient Medications on File Prior to Visit   Medication Sig Dispense Refill    losartan-hydroCHLOROthiazide (HYZAAR) 50-12.5 MG per tablet Take 1 tablet by mouth daily 30 tablet 3    Multiple Vitamins-Minerals (THERAPEUTIC MULTIVITAMIN-MINERALS) tablet Take 1 tablet by mouth daily      triamcinolone (KENALOG) 0.1 % cream Apply topically 2 times daily. 454 g 0    pantoprazole (PROTONIX) 40 MG tablet Take 1 tablet by mouth daily 30 tablet 5     No current facility-administered medications on file prior to visit. Past Medical History:   Diagnosis Date    Scoliosis      History reviewed. No pertinent surgical history.   Social History     Socioeconomic History    Marital status: Single     Spouse name: Not on file    Number of children: Not on file    Years of education: Not on file    Highest education level: Not on file   Occupational History    Not on file   Social Needs    Financial resource strain: Not hard at all    Food insecurity     Worry: Patient refused     Inability: Patient refused    Transportation needs     Medical: Patient refused     Non-medical: Patient refused   Tobacco Use    Smoking status: Never Smoker    Smokeless tobacco: Never Used   Substance and Sexual Activity    Alcohol use: Yes     Comment: occasional    Drug use: Yes     Types: Marijuana    Sexual activity: Yes   Lifestyle    Physical activity     Days per week: Not on file     Minutes per session: Not on file    Stress: Not on file   Relationships    Social connections     Talks on phone: Not on file     Gets together: Not on file     Attends Sikh service: Not on file     Active member of club or organization: Not on file     Attends meetings of clubs or organizations: Not on file     Relationship status: Not on file    Intimate partner violence     Fear of current or ex partner: Not on file     Emotionally abused: Not on file     Physically abused: Not on file     Forced sexual activity: Not on file   Other Topics Concern    Not on file   Social History Narrative    Not on file     Family History   Problem Relation Age of Onset    Heart Disease Maternal Grandfather     High Blood Pressure Maternal Grandfather      Allergies:  Patient has no known allergies. Review of Systems   Constitutional: Negative for chills, diaphoresis, fatigue and fever. Cardiovascular: Positive for chest pain and leg swelling (wearing compression socks). Skin: Positive for color change and rash. Negative for wound. Neurological: Positive for headaches. Psychiatric/Behavioral: The patient is nervous/anxious. Objective:   Pulse 88   Temp 98.2 °F (36.8 °C)   Ht 5' 9\" (1.753 m)   Wt 245 lb (111.1 kg)   SpO2 99%   BMI 36.18 kg/m²     Physical Exam  Constitutional:       General: He is not in acute distress. Appearance: Normal appearance. He is well-developed. He is not toxic-appearing. HENT:      Head: Normocephalic and atraumatic. Right Ear: Hearing and tympanic membrane normal.      Left Ear: Hearing and tympanic membrane normal.      Nose: Nose normal. No nasal deformity. Eyes:      General: Lids are normal.         Right eye: No discharge. Left eye: No discharge. Conjunctiva/sclera: Conjunctivae normal.      Pupils: Pupils are equal, round, and reactive to light. Neck:      Musculoskeletal: Full passive range of motion without pain. Thyroid: No thyroid mass or thyromegaly. Vascular: No JVD. Trachea: Trachea and phonation normal.   Cardiovascular:      Rate and Rhythm: Normal rate and regular rhythm. Pulmonary:      Effort: No accessory muscle usage or respiratory distress. Musculoskeletal:      Comments: FROM all large muscle groups and joints as witnessed when walking to exam table, getting on, and getting off the exam table.     Skin: General: Skin is warm and dry. Findings: No rash. Comments: Bilateral upper extremity improved texture. Discoloration reminds min pink at the wrist but there is no texture abnormality    Lower extremities have no texture abnormality but pigmentation has extended from prior. Picture below. Neurological:      Mental Status: He is alert. Motor: No tremor or atrophy. Gait: Gait normal.   Psychiatric:         Speech: Speech normal.         Behavior: Behavior normal.         Thought Content: Thought content normal.             No results found for this visit on 03/08/21. Pt was seen and examined, stress test and consultation letter reviewed, laboratory testing reviewed by provider for 45  Minutes  Counseling and coordination of care was done for all assessment diagnosis listed for today with patient and any family/friend present. More than 50% of this visit was spent coordinating cuurent care, obtaining information for prior records, and counseling for current plan of action. Educated the patient on not changing any skin treatments at this time. Encouraged him to continue medications as been ordered so far. Discussed mechanism of action of Lexapro and expected timeframe for response          Assessment:       Diagnosis Orders   1. Keratosis pilaris     2. Anxiety  escitalopram (LEXAPRO) 10 MG tablet   3. Essential hypertension     4. Venous stasis dermatitis of both lower extremities           No orders of the defined types were placed in this encounter. Plan:   Return in about 4 weeks (around 4/5/2021) for for review of outcome of today's recommendation. Patient Instructions   Patient has been advised on how to initiate medication. The patient should start lexapro medication 1/2 tab  daily  for 1 week until tolerance is noted (demonstrated by a lack of side effects) and then the patient should follow tablet dosage per medication bottle instructions.   (If the patient is not tolerating the half tab dose at one week that dose should be continued and the office should be notified. The patient should not yet advance to the full tablet dosing)      The patient is aware that this medication should be taken routinely, the timing should be any time of day the patient can remember to take this reliably. The patient has been advised that the known side effects of this medication are nausea. If the patient has missed a medication dose this should be taken as soon as remembered unless the patient has been instructed that this medication is sedating. In that case, the medication should not be taken until the following bedtime. In addition, if the patient has been advised that this medication is stimulating they should not take the dose prior to bedtime. The next dose should be taken at routine timing. If the medication is neither sedating or stimulating to the patient the dose can be taken up to 6 hours prior to the next dose. If there are less than 6 hours to the next dose, the patient should wait and take the next dose (skipping the missed dose)    The patient has been advised that there is often initial improvement of symptoms the first week of medication followed by a decrease in control symptoms the second week of medication. These medications take 3-4 weeks to balance with your brain chemicals. It is not until 3-4 weeks of medication dosing that we'll be able to determine how well this current dose is really working. Therefore, follow-up for these medications as generally 3-4 weeks after a dose initiation or change. In the meantime, counseling should be sought to learn behavioral modification methods for controlling symptoms. The patient also has been advised this medication should not just be instantly stopped. If the patient desires discontinuation for some reason an appointment should be made  to discuss a plan of discontinuation.   The only exception for this is if the patient is in the first week of treatment. If a side effect as found to be intolerable and the patient is in the first week of treatment the medication can be stopped. The office should be notified. This note was partially created with the assistance of dictation. This may lead to grammatical or spelling errors. Dionisio Walls M.D.

## 2021-03-08 NOTE — PATIENT INSTRUCTIONS
Patient has been advised on how to initiate medication. The patient should start lexapro medication 1/2 tab  daily  for 1 week until tolerance is noted (demonstrated by a lack of side effects) and then the patient should follow tablet dosage per medication bottle instructions. (If the patient is not tolerating the half tab dose at one week that dose should be continued and the office should be notified. The patient should not yet advance to the full tablet dosing)      The patient is aware that this medication should be taken routinely, the timing should be any time of day the patient can remember to take this reliably. The patient has been advised that the known side effects of this medication are nausea. If the patient has missed a medication dose this should be taken as soon as remembered unless the patient has been instructed that this medication is sedating. In that case, the medication should not be taken until the following bedtime. In addition, if the patient has been advised that this medication is stimulating they should not take the dose prior to bedtime. The next dose should be taken at routine timing. If the medication is neither sedating or stimulating to the patient the dose can be taken up to 6 hours prior to the next dose. If there are less than 6 hours to the next dose, the patient should wait and take the next dose (skipping the missed dose)    The patient has been advised that there is often initial improvement of symptoms the first week of medication followed by a decrease in control symptoms the second week of medication. These medications take 3-4 weeks to balance with your brain chemicals. It is not until 3-4 weeks of medication dosing that we'll be able to determine how well this current dose is really working. Therefore, follow-up for these medications as generally 3-4 weeks after a dose initiation or change.     In the meantime, counseling should be sought to learn behavioral modification methods for controlling symptoms. The patient also has been advised this medication should not just be instantly stopped. If the patient desires discontinuation for some reason an appointment should be made  to discuss a plan of discontinuation. The only exception for this is if the patient is in the first week of treatment. If a side effect as found to be intolerable and the patient is in the first week of treatment the medication can be stopped. The office should be notified.

## 2021-03-23 ENCOUNTER — OFFICE VISIT (OUTPATIENT)
Dept: NEUROLOGY | Age: 36
End: 2021-03-23
Payer: COMMERCIAL

## 2021-03-23 VITALS
SYSTOLIC BLOOD PRESSURE: 142 MMHG | BODY MASS INDEX: 36.12 KG/M2 | WEIGHT: 244.6 LBS | DIASTOLIC BLOOD PRESSURE: 96 MMHG | HEART RATE: 92 BPM

## 2021-03-23 DIAGNOSIS — M54.2 CERVICALGIA: ICD-10-CM

## 2021-03-23 DIAGNOSIS — G62.9 NEUROPATHY: Primary | ICD-10-CM

## 2021-03-23 DIAGNOSIS — R20.2 DISTAL PARESTHESIA: ICD-10-CM

## 2021-03-23 DIAGNOSIS — R53.82 CHRONIC FATIGUE: ICD-10-CM

## 2021-03-23 DIAGNOSIS — G56.03 BILATERAL CARPAL TUNNEL SYNDROME: ICD-10-CM

## 2021-03-23 PROCEDURE — G8484 FLU IMMUNIZE NO ADMIN: HCPCS | Performed by: PSYCHIATRY & NEUROLOGY

## 2021-03-23 PROCEDURE — 99204 OFFICE O/P NEW MOD 45 MIN: CPT | Performed by: PSYCHIATRY & NEUROLOGY

## 2021-03-23 PROCEDURE — G8417 CALC BMI ABV UP PARAM F/U: HCPCS | Performed by: PSYCHIATRY & NEUROLOGY

## 2021-03-23 PROCEDURE — 1036F TOBACCO NON-USER: CPT | Performed by: PSYCHIATRY & NEUROLOGY

## 2021-03-23 PROCEDURE — G8427 DOCREV CUR MEDS BY ELIG CLIN: HCPCS | Performed by: PSYCHIATRY & NEUROLOGY

## 2021-03-23 ASSESSMENT — ENCOUNTER SYMPTOMS
COLOR CHANGE: 0
VOMITING: 0
SHORTNESS OF BREATH: 0
TROUBLE SWALLOWING: 0
BACK PAIN: 0
NAUSEA: 0
CHOKING: 0
PHOTOPHOBIA: 0

## 2021-03-23 NOTE — PROGRESS NOTES
Subjective:      Patient ID: Elvia Garzon is a 37 y.o. male who presents today for:  Chief Complaint   Patient presents with    New Patient     Pt states that he has been having the pain since about end of jan with the neuropathy. Neck pain since the end of july. No recent injury that he recalls. Recent xray of neck. Neuropathy in the legs, feet, arms sometimes it is a burning feeling, numbenss and tingling is more so in the fingers and toes. If he is doing certain things neuropathy is worse. HPI 68-year-old right-handed gentleman who is here for evaluation of multiple symptoms. Patient was seen for an EMG study with numbness in his hands and shows a small carpal tunnel syndrome. patient is now here with multiple other symptoms including fatigue he feels he has an ms hug.  he reports no bladder discomfort but has bowel issues he has considerable other numbness and tingling in his toes and hands. he used to consume about 8 beers a day for a long. time and since thanksgiving he has not done this. patient had other evaluations laboratory test which are reviewed is a normal smooth muscle antibody normal copper level with a normal B12 levels as well. Denies other aches and pains and spasms at times. Is not any gait issues. He had some dermatological issues and was given prednisone he feels somewhat better. He still able to lift as much weight as he was doing previously. Patient is considerable neck issues and neck pain with pain radiating down on the left more than the right and he does get headaches from the same    Past Medical History:   Diagnosis Date    Scoliosis      No past surgical history on file.   Social History     Socioeconomic History    Marital status: Single     Spouse name: Not on file    Number of children: Not on file    Years of education: Not on file    Highest education level: Not on file   Occupational History    Not on file   Social Needs    Financial resource strain: Not hard at all    Food insecurity     Worry: Patient refused     Inability: Patient refused    Transportation needs     Medical: Patient refused     Non-medical: Patient refused   Tobacco Use    Smoking status: Never Smoker    Smokeless tobacco: Never Used   Substance and Sexual Activity    Alcohol use: Yes     Comment: occasional    Drug use: Yes     Types: Marijuana    Sexual activity: Yes   Lifestyle    Physical activity     Days per week: Not on file     Minutes per session: Not on file    Stress: Not on file   Relationships    Social connections     Talks on phone: Not on file     Gets together: Not on file     Attends Moravian service: Not on file     Active member of club or organization: Not on file     Attends meetings of clubs or organizations: Not on file     Relationship status: Not on file    Intimate partner violence     Fear of current or ex partner: Not on file     Emotionally abused: Not on file     Physically abused: Not on file     Forced sexual activity: Not on file   Other Topics Concern    Not on file   Social History Narrative    Not on file     Family History   Problem Relation Age of Onset    Heart Disease Maternal Grandfather     High Blood Pressure Maternal Grandfather      No Known Allergies    Current Outpatient Medications   Medication Sig Dispense Refill    escitalopram (LEXAPRO) 10 MG tablet Take 1 tablet by mouth daily 30 tablet 3    losartan-hydroCHLOROthiazide (HYZAAR) 50-12.5 MG per tablet Take 1 tablet by mouth daily 30 tablet 3    Multiple Vitamins-Minerals (THERAPEUTIC MULTIVITAMIN-MINERALS) tablet Take 1 tablet by mouth daily      triamcinolone (KENALOG) 0.1 % cream Apply topically 2 times daily. 454 g 0    pantoprazole (PROTONIX) 40 MG tablet Take 1 tablet by mouth daily 30 tablet 5     No current facility-administered medications for this visit. Review of Systems   Constitutional: Negative for fever.    HENT: Negative for ear pain, tinnitus and trouble swallowing. Eyes: Negative for photophobia and visual disturbance. Respiratory: Negative for choking and shortness of breath. Cardiovascular: Negative for chest pain and palpitations. Gastrointestinal: Negative for nausea and vomiting. Musculoskeletal: Negative for back pain, gait problem, joint swelling, myalgias, neck pain and neck stiffness. Skin: Negative for color change. Allergic/Immunologic: Negative for food allergies. Neurological: Negative for dizziness, tremors, seizures, syncope, facial asymmetry, speech difficulty, weakness, light-headedness, numbness and headaches. Psychiatric/Behavioral: Negative for behavioral problems, confusion, hallucinations and sleep disturbance. Objective:   BP (!) 142/96 (Site: Left Upper Arm, Position: Sitting, Cuff Size: Medium Adult)   Pulse 92   Wt 244 lb 9.6 oz (110.9 kg)   BMI 36.12 kg/m²     Physical Exam  Vitals signs reviewed. Eyes:      Pupils: Pupils are equal, round, and reactive to light. Neck:      Musculoskeletal: Normal range of motion. Cardiovascular:      Rate and Rhythm: Normal rate and regular rhythm. Heart sounds: No murmur. Pulmonary:      Effort: Pulmonary effort is normal.      Breath sounds: Normal breath sounds. Abdominal:      General: Bowel sounds are normal.   Musculoskeletal: Normal range of motion. Skin:     General: Skin is warm. Neurological:      Mental Status: He is alert and oriented to person, place, and time. Cranial Nerves: No cranial nerve deficit. Sensory: No sensory deficit. Motor: No abnormal muscle tone. Coordination: Coordination normal.      Deep Tendon Reflexes: Reflexes are normal and symmetric. Babinski sign absent on the right side. Babinski sign absent on the left side. Psychiatric:         Mood and Affect: Mood normal.         No results found.     Lab Results   Component Value Date    WBC 8.2 01/27/2021    RBC 5.67 01/27/2021    HGB 16.3 01/27/2021    HCT 46.5 01/27/2021    MCV 82.1 01/27/2021    MCH 28.8 01/27/2021    MCHC 35.1 01/27/2021    RDW 11.9 01/27/2021     01/27/2021     Lab Results   Component Value Date     01/27/2021    K 4.8 01/27/2021     01/27/2021    CO2 27 01/27/2021    BUN 14 01/27/2021    CREATININE 0.91 01/27/2021    GFRAA >60.0 01/27/2021    LABGLOM >60.0 01/27/2021    GLUCOSE 108 01/27/2021    PROT 7.1 01/27/2021    LABALBU 4.7 01/27/2021    CALCIUM 9.6 01/27/2021    BILITOT 0.7 01/27/2021    ALKPHOS 60 01/27/2021    AST 36 01/27/2021    ALT 83 01/27/2021     No results found for: PROTIME, INR  Lab Results   Component Value Date    VWVKKFFO24 452 02/09/2021    FOLATE 17.9 02/09/2021    IRON 100 02/09/2021    TIBC 291 02/09/2021     Lab Results   Component Value Date    TRIG 91 02/09/2021    HDL 47 02/09/2021    LDLCALC 131 02/09/2021     No results found for: LABAMPH, BARBSCNU, LABBENZ, CANNAB, COCAINESCRN, LABMETH, OPIATESCREENURINE, PHENCYCLIDINESCREENURINE, PPXUR, ETOH  No results found for: LITHIUM, DILFRTOT, VALPROATE    Assessment:       Diagnosis Orders   1. Neuropathy     2. Chronic fatigue     3. Distal paresthesia     4. Cervicalgia     5. Bilateral carpal tunnel syndrome     Multiple symptoms which quite do not fit into any anatomical category. Patient has paresthesias  with fatigue and neck pain. His age group 3 must rule out demyelination seen multiple sclerosis and given that he has cervical spinal symptoms recommend MRI of the brain and cervical spine to rule out demyelination seen multiple sclerosis. Patient's multiple other symptoms which we will address once we rule out the main cause which may be neurologic and if not then other things may be considered.   Patient has had some other evaluations which are reviewed in the chart and has not shown anything abnormal on his laboratory test.  We had performed an EMG nerve conduction study which showed just mild carpal tunnel syndrome and given the symptoms apparently a central pathology such as demyelination must be ruled out. Clinical examination reveals minor asymmetry of reflexes to the left. Patient does retain all his reflexes. Patient does have history of alcohol use which is considerable prior to Thanksgiving and was mostly all beer for the entire year. This may cause some neurological dysfunctions which are usually transient      Plan:      No orders of the defined types were placed in this encounter. No orders of the defined types were placed in this encounter. No follow-ups on file.       Governor MD Caitlin

## 2021-04-26 ENCOUNTER — HOSPITAL ENCOUNTER (OUTPATIENT)
Dept: MRI IMAGING | Age: 36
Discharge: HOME | End: 2021-04-28
Payer: COMMERCIAL

## 2021-04-26 ENCOUNTER — OFFICE VISIT (OUTPATIENT)
Dept: FAMILY MEDICINE CLINIC | Age: 36
End: 2021-04-26
Payer: COMMERCIAL

## 2021-04-26 VITALS
SYSTOLIC BLOOD PRESSURE: 112 MMHG | HEART RATE: 90 BPM | OXYGEN SATURATION: 98 % | BODY MASS INDEX: 38.25 KG/M2 | WEIGHT: 252.4 LBS | TEMPERATURE: 96.5 F | HEIGHT: 68 IN | DIASTOLIC BLOOD PRESSURE: 80 MMHG

## 2021-04-26 DIAGNOSIS — I10 ESSENTIAL HYPERTENSION: ICD-10-CM

## 2021-04-26 DIAGNOSIS — R20.2 DISTAL PARESTHESIA: ICD-10-CM

## 2021-04-26 DIAGNOSIS — J32.0 CHRONIC MAXILLARY SINUSITIS: Primary | ICD-10-CM

## 2021-04-26 DIAGNOSIS — F41.9 ANXIETY: ICD-10-CM

## 2021-04-26 DIAGNOSIS — K21.9 GASTROESOPHAGEAL REFLUX DISEASE WITHOUT ESOPHAGITIS: ICD-10-CM

## 2021-04-26 DIAGNOSIS — J33.8 MAXILLARY SINUS POLYP: ICD-10-CM

## 2021-04-26 PROCEDURE — 99214 OFFICE O/P EST MOD 30 MIN: CPT | Performed by: FAMILY MEDICINE

## 2021-04-26 PROCEDURE — 1036F TOBACCO NON-USER: CPT | Performed by: FAMILY MEDICINE

## 2021-04-26 PROCEDURE — 70551 MRI BRAIN STEM W/O DYE: CPT

## 2021-04-26 PROCEDURE — G8417 CALC BMI ABV UP PARAM F/U: HCPCS | Performed by: FAMILY MEDICINE

## 2021-04-26 PROCEDURE — G8427 DOCREV CUR MEDS BY ELIG CLIN: HCPCS | Performed by: FAMILY MEDICINE

## 2021-04-26 PROCEDURE — 72141 MRI NECK SPINE W/O DYE: CPT

## 2021-04-26 RX ORDER — PANTOPRAZOLE SODIUM 40 MG/1
40 TABLET, DELAYED RELEASE ORAL DAILY PRN
Qty: 30 TABLET | Refills: 5 | Status: SHIPPED | OUTPATIENT
Start: 2021-04-26 | End: 2022-08-08

## 2021-04-26 RX ORDER — GUAIFENESIN 600 MG/1
600 TABLET, EXTENDED RELEASE ORAL 2 TIMES DAILY
Qty: 60 TABLET | Refills: 0 | Status: SHIPPED | OUTPATIENT
Start: 2021-04-26 | End: 2021-05-26

## 2021-04-26 RX ORDER — AMOXICILLIN AND CLAVULANATE POTASSIUM 875; 125 MG/1; MG/1
1 TABLET, FILM COATED ORAL 2 TIMES DAILY
Qty: 40 TABLET | Refills: 0 | Status: SHIPPED | OUTPATIENT
Start: 2021-04-26 | End: 2021-05-16

## 2021-04-26 ASSESSMENT — ENCOUNTER SYMPTOMS: SINUS PAIN: 1

## 2021-04-26 NOTE — PROGRESS NOTES
sinusitis is caused by a fungal infection, you may need to take antifungals or other medicines. You may also need surgery. Follow-up care is a key part of your treatment and safety. Be sure to make and go to all appointments, and call your doctor if you are having problems. It's also a good idea to know your test results and keep a list of the medicines you take. How can you care for yourself at home? Medicines    · Be safe with medicines. Take your medicines exactly as prescribed. Call your doctor if you think you are having a problem with your medicine. You will get more details on the specific medicines your doctor prescribes.     · Take your antibiotics as directed. Do not stop taking them just because you feel better. You need to take the full course of antibiotics.     · Your doctor may recommend a corticosteroid nasal spray, wash, drops, or pills. Take this medicine exactly as prescribed. At home    · Breathe warm, moist air. You can use a steamy shower, a hot bath, or a sink filled with hot water. Avoid cold, dry air. Using a humidifier in your home may help. Follow the instructions for cleaning the machine.     · Use saline (saltwater) nasal washes every day. This helps keep your nasal passages open. It also can wash out mucus and bacteria. ? You can buy saline nose drops at a grocery store or drugstore. ? You can make your own at home. Add 1 teaspoon of salt and 1 teaspoon of baking soda to 2 cups of distilled water. If you make your own, fill a bulb syringe with the solution. Then insert the tip into your nostril and squeeze gently. Bob Better your nose.     · Put a warm, wet towel or a warm gel pack on your face 3 or 4 times a day. Leave it on 5 to 10 minutes each time.     · Do not smoke or breathe secondhand smoke. Smoking can make sinusitis worse. If you need help quitting, talk to your doctor about stop-smoking programs and medicines. These can increase your chances of quitting for good.    When by Dr. Veronica Patel and was wondering what the results were. We looked that up and I cannot comment on normal neurology but does appear normal but did note that there was a large polyp in the maxillary sinus and chronic sinusitis. So we talked about that and the contribution that can have to how he feels      Current Outpatient Medications on File Prior to Visit   Medication Sig Dispense Refill    escitalopram (LEXAPRO) 10 MG tablet Take 1 tablet by mouth daily 30 tablet 3    losartan-hydroCHLOROthiazide (HYZAAR) 50-12.5 MG per tablet Take 1 tablet by mouth daily 30 tablet 3    Multiple Vitamins-Minerals (THERAPEUTIC MULTIVITAMIN-MINERALS) tablet Take 1 tablet by mouth daily      triamcinolone (KENALOG) 0.1 % cream Apply topically 2 times daily. (Patient not taking: Reported on 4/26/2021) 454 g 0     No current facility-administered medications on file prior to visit. Past Medical History:   Diagnosis Date    Scoliosis      History reviewed. No pertinent surgical history.   Social History     Socioeconomic History    Marital status: Single     Spouse name: Not on file    Number of children: Not on file    Years of education: Not on file    Highest education level: Not on file   Occupational History    Not on file   Social Needs    Financial resource strain: Not hard at all    Food insecurity     Worry: Patient refused     Inability: Patient refused    Transportation needs     Medical: Patient refused     Non-medical: Patient refused   Tobacco Use    Smoking status: Never Smoker    Smokeless tobacco: Never Used   Substance and Sexual Activity    Alcohol use: Yes     Comment: occasional    Drug use: Yes     Types: Marijuana    Sexual activity: Yes   Lifestyle    Physical activity     Days per week: Not on file     Minutes per session: Not on file    Stress: Not on file   Relationships    Social connections     Talks on phone: Not on file     Gets together: Not on file     Attends Jehovah's witness service: Not on file     Active member of club or organization: Not on file     Attends meetings of clubs or organizations: Not on file     Relationship status: Not on file    Intimate partner violence     Fear of current or ex partner: Not on file     Emotionally abused: Not on file     Physically abused: Not on file     Forced sexual activity: Not on file   Other Topics Concern    Not on file   Social History Narrative    Not on file     Family History   Problem Relation Age of Onset    Heart Disease Maternal Grandfather     High Blood Pressure Maternal Grandfather      Allergies:  Patient has no known allergies. Review of Systems   Constitutional: Positive for fatigue. Negative for chills and fever. HENT: Positive for congestion and sinus pain. Neurological: Negative for headaches. Psychiatric/Behavioral: Positive for sleep disturbance. The patient is not nervous/anxious. Objective:   /80 (Site: Right Upper Arm, Position: Sitting, Cuff Size: Large Adult)   Pulse 90   Temp 96.5 °F (35.8 °C)   Ht 5' 8\" (1.727 m)   Wt 252 lb 6.4 oz (114.5 kg)   SpO2 98%   BMI 38.38 kg/m²     Physical Exam  Vitals signs reviewed. Constitutional:       General: He is not in acute distress. Appearance: He is well-developed. HENT:      Head: Normocephalic and atraumatic. Right Ear: External ear normal.      Left Ear: External ear normal.      Nose: Nose normal.   Eyes:      General:         Right eye: No discharge. Left eye: No discharge. Conjunctiva/sclera: Conjunctivae normal.      Pupils: Pupils are equal, round, and reactive to light. Neck:      Musculoskeletal: Neck supple. Thyroid: No thyromegaly. Cardiovascular:      Rate and Rhythm: Normal rate and regular rhythm. Pulmonary:      Effort: Pulmonary effort is normal. No respiratory distress. Abdominal:      General: There is no distension. Skin:     General: Skin is warm and dry.    Neurological:      Mental Status: Interpretive Comment See Note    ECHO Complete 2D W Doppler W Color    Collection Time: 02/17/21  9:04 AM   Result Value Ref Range    Left Ventricular Ejection Fraction 65     LVEF MODALITY ECHO            Assessment:       Diagnosis Orders   1. Chronic maxillary sinusitis  amoxicillin-clavulanate (AUGMENTIN) 875-125 MG per tablet    guaiFENesin (MUCINEX) 600 MG extended release tablet   2. Maxillary sinus polyp  CAMELIA Melchor MD, Otolaryngology, [facility]   3. Anxiety     4. Essential hypertension     5. Gastroesophageal reflux disease without esophagitis  pantoprazole (PROTONIX) 40 MG tablet         Orders Placed This Encounter   Procedures   Bobby Miguel MD, Otolaryngology, [facility]     Referral Priority:   Routine     Referral Type:   Eval and Treat     Referral Reason:   Specialty Services Required     Referred to Provider:   Merced Mata MD     Requested Specialty:   Otolaryngology     Number of Visits Requested:   1         Plan:   Return in about 8 weeks (around 6/24/2021) for for review of outcome of today's recommendation. Patient Instructions     Blood pressure control has improved on anxiety medications. Continue current doses of everything. Discussed utilizing Lexapro for 6 months to a year before changing doses in terms of weaning away if he feels well    Fatigue has been noted and initiation of treatment for chronic sinusitis with maxillary polyp has been started with antibiotic as well as consult for ENT. If this does not change how he feels we will continue to look at the possibility of having testosterone levels checked. Patient Education        Chronic Sinusitis: Care Instructions  Your Care Instructions     Sinusitis is an infection of the lining of the sinus cavities in your head. It causes pain and pressure in your head and face. Sinusitis can be short-term (acute) or long-term (chronic). Chronic sinusitis lasts 12 weeks or longer.  It is often caused by a bacterial or fungal infection. Other things, such as allergies, may also be involved. Chronic sinusitis may be hard to treat. It can lead to permanent changes in the mucous membranes that line the sinuses. It may make future sinus infections more likely. The infection may take some time to treat. Antibiotics are usually used if the infection is caused by bacteria. You may also need to use a corticosteroid nasal spray. If the infection is not cured after you try two or more different antibiotics, you may want to talk with your doctor about surgery or allergy testing. If the sinusitis is caused by a fungal infection, you may need to take antifungals or other medicines. You may also need surgery. Follow-up care is a key part of your treatment and safety. Be sure to make and go to all appointments, and call your doctor if you are having problems. It's also a good idea to know your test results and keep a list of the medicines you take. How can you care for yourself at home? Medicines    · Be safe with medicines. Take your medicines exactly as prescribed. Call your doctor if you think you are having a problem with your medicine. You will get more details on the specific medicines your doctor prescribes.     · Take your antibiotics as directed. Do not stop taking them just because you feel better. You need to take the full course of antibiotics.     · Your doctor may recommend a corticosteroid nasal spray, wash, drops, or pills. Take this medicine exactly as prescribed. At home    · Breathe warm, moist air. You can use a steamy shower, a hot bath, or a sink filled with hot water. Avoid cold, dry air. Using a humidifier in your home may help. Follow the instructions for cleaning the machine.     · Use saline (saltwater) nasal washes every day. This helps keep your nasal passages open. It also can wash out mucus and bacteria. ? You can buy saline nose drops at a grocery store or drugstore.   ? You can make your own at home. Add 1 teaspoon of salt and 1 teaspoon of baking soda to 2 cups of distilled water. If you make your own, fill a bulb syringe with the solution. Then insert the tip into your nostril and squeeze gently. Taffy Juan your nose.     · Put a warm, wet towel or a warm gel pack on your face 3 or 4 times a day. Leave it on 5 to 10 minutes each time.     · Do not smoke or breathe secondhand smoke. Smoking can make sinusitis worse. If you need help quitting, talk to your doctor about stop-smoking programs and medicines. These can increase your chances of quitting for good. When should you call for help? Call your doctor now or seek immediate medical care if:    · You have new or worse symptoms of infection, such as:  ? Increased pain, swelling, warmth, or redness. ? Red streaks leading from the area. ? Pus draining from the area. ? A fever. Watch closely for changes in your health, and be sure to contact your doctor if:    · The mucus from your nose becomes thicker (like pus) or has new blood in it.     · You do not get better as expected. Where can you learn more? Go to [email or web address].SNAPP'. org and sign in to your F[employer] It account. Enter Z012 in the [employer] box to learn more about \"Chronic Sinusitis: Care Instructions. \"     If you do not have an account, please click on the \"Sign Up Now\" link. Current as of: December 2, 2020               Content Version: 12.8  © 2006-2021 Healthwise, [facility]. Care instructions adapted under license by [facility] ([facility]). If you have questions about a medical condition or this instruction, always ask your healthcare professional. [place] [address] any warranty or liability for your use of this information. This note was partially created with the assistance of dictation. This may lead to grammatical or spelling errors. Dionisio Erickson M.D.

## 2021-04-26 NOTE — PATIENT INSTRUCTIONS
[email or web address] and sign in to your [employer] account. Enter H969 in the [facility] box to learn more about \"Chronic Sinusitis: Care Instructions. \"     If you do not have an account, please click on the \"Sign Up Now\" link. Current as of: December 2, 2020               Content Version: 12.8  © 5534-2103 [employer], [facility]. Care instructions adapted under license by [facility] ([facility]). If you have questions about a medical condition or this instruction, always ask your healthcare professional. [place] [address] any warranty or liability for your use of this information.

## 2021-05-05 ENCOUNTER — TELEPHONE (OUTPATIENT)
Dept: NEUROLOGY | Age: 36
End: 2021-05-05

## 2021-06-04 RX ORDER — LOSARTAN POTASSIUM AND HYDROCHLOROTHIAZIDE 12.5; 5 MG/1; MG/1
TABLET ORAL
Qty: 30 TABLET | Refills: 6 | Status: SHIPPED | OUTPATIENT
Start: 2021-06-04 | End: 2022-01-19

## 2021-06-09 ENCOUNTER — OFFICE VISIT (OUTPATIENT)
Dept: CARDIOLOGY CLINIC | Age: 36
End: 2021-06-09
Payer: COMMERCIAL

## 2021-06-09 VITALS
HEIGHT: 68 IN | HEART RATE: 93 BPM | DIASTOLIC BLOOD PRESSURE: 86 MMHG | OXYGEN SATURATION: 98 % | BODY MASS INDEX: 39.56 KG/M2 | SYSTOLIC BLOOD PRESSURE: 126 MMHG | WEIGHT: 261 LBS

## 2021-06-09 DIAGNOSIS — I10 HYPERTENSION, UNSPECIFIED TYPE: Primary | ICD-10-CM

## 2021-06-09 PROCEDURE — G8427 DOCREV CUR MEDS BY ELIG CLIN: HCPCS | Performed by: INTERNAL MEDICINE

## 2021-06-09 PROCEDURE — 99214 OFFICE O/P EST MOD 30 MIN: CPT | Performed by: INTERNAL MEDICINE

## 2021-06-09 PROCEDURE — 1036F TOBACCO NON-USER: CPT | Performed by: INTERNAL MEDICINE

## 2021-06-09 PROCEDURE — G8417 CALC BMI ABV UP PARAM F/U: HCPCS | Performed by: INTERNAL MEDICINE

## 2021-06-09 ASSESSMENT — ENCOUNTER SYMPTOMS
VOMITING: 0
BLOOD IN STOOL: 0
FACIAL SWELLING: 0
ANAL BLEEDING: 0
SHORTNESS OF BREATH: 0
NAUSEA: 0
ABDOMINAL DISTENTION: 0
TROUBLE SWALLOWING: 0
COLOR CHANGE: 0
APNEA: 0
CHEST TIGHTNESS: 0
WHEEZING: 0
VOICE CHANGE: 0

## 2021-06-09 NOTE — PROGRESS NOTES
[facility] CARDIOLOGY OFFICE FOLLOW-UP      Patient: Mckay Knight  YOB: 1985  MRN: [id number]    Chief Complaint:  Chief Complaint   Patient presents with    3 Month Follow-Up    Hypertension    Medication Check     hyzaar          Subjective/HPI:  6/9/21: Patient presents today for follow-up of hypertension. Much better blood pressure control. Did not get the Covid vaccine and insisted that she he should get it done as soon as possible. 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 I think he works for the city of [employer]. Does not smoke. He smokes medical marijuana. Physically active. Of advised him to drink a lot of water especially because of hot weather and he is on diuretic combination. He needs to get 8 hours of sleep. Cut down his carb intake. See me in 4 months    03/3/21: Patient presents today for follow-up of stress test and echo test was negative. Echo showed grade 1 LV diastolic dysfunction 1+ mitral regurgitation. He tends to run blood pressure is a bit high ranges. We need to start him on losartan-hydrochlorothiazide 50/12.5 daily. He does not smoke and is moderately obese. Currently on prednisone pack also has GERD.   He will see me in 3 months        2/3/21: Patient presents today for consulted by Dr. Mary Landaverde for chest pain. 82-year-old . Yulisa Daniel not smoke.  Has a history of alcohol abuse and has  Currently cut down.  Recently saw Dr Padmaja Posey started on omeprazole. America Bob has helped some. Romain Nails has family history of heart disease.  Also occasionally he has been known to have high blood pressure one time he had some issues including blood pressure with systolic was 820.  Going to set him up for regular stress test and echo at [facility] and then see me.  His lipid profile is a little concerning.  His father has diabetes. Romain Nails needs to lose about 2030 pounds.  Triglycerides up.  And I am hoping that with his suction in the use of alcohol that would be beneficial too    MOUTH ONCE DAILY 30 tablet 6    pantoprazole (PROTONIX) 40 MG tablet Take 1 tablet by mouth daily as needed (acid reflux) 30 tablet 5    escitalopram (LEXAPRO) 10 MG tablet Take 1 tablet by mouth daily 30 tablet 3    Multiple Vitamins-Minerals (THERAPEUTIC MULTIVITAMIN-MINERALS) tablet Take 1 tablet by mouth daily      triamcinolone (KENALOG) 0.1 % cream Apply topically 2 times daily. 454 g 0     No current facility-administered medications for this visit. Review of Systems:   Review of Systems   Constitutional: Negative for activity change, appetite change, diaphoresis, fatigue and unexpected weight change. HENT: Negative for facial swelling, nosebleeds, trouble swallowing and voice change. Respiratory: Negative for apnea, chest tightness, shortness of breath and wheezing. Cardiovascular: Negative for chest pain, palpitations and leg swelling. Gastrointestinal: Negative for abdominal distention, anal bleeding, blood in stool, nausea and vomiting. Genitourinary: Negative for decreased urine volume and dysuria. Musculoskeletal: Negative for gait problem and myalgias. Skin: Negative for color change, pallor, rash and wound. Neurological: Negative for dizziness, syncope, facial asymmetry, weakness, light-headedness, numbness and headaches. Hematological: Does not bruise/bleed easily. Psychiatric/Behavioral: Negative for agitation, behavioral problems, confusion, hallucinations and suicidal ideas. The patient is not nervous/anxious. All other systems reviewed and are negative. Review of System is negative except for as mentioned above. Physical Examination:    /86 (Site: Left Upper Arm, Position: Sitting, Cuff Size: Large Adult)   Pulse 93   Ht 5' 8\" (1.727 m)   Wt 261 lb (118.4 kg)   SpO2 98%   BMI 39.68 kg/m²    Physical Exam   Constitutional: He appears healthy. No distress. HENT:   Nose: Nose normal.   Mouth/Throat: Dentition is normal. Oropharynx is clear. Eyes: Pupils are equal, round, and reactive to light. Conjunctivae are normal.   Neck: Thyroid normal.   Cardiovascular: Regular rhythm, S1 normal, S2 normal, normal heart sounds, intact distal pulses and normal pulses. PMI is not displaced. No murmur heard. Pulmonary/Chest: He has no wheezes. He has no rales. He exhibits no tenderness. Abdominal: Soft. Bowel sounds are normal. He exhibits no distension and no mass. There is no splenomegaly or hepatomegaly. There is no abdominal tenderness. No hernia. Musculoskeletal:      Cervical back: Normal range of motion and neck supple. Neurological: He is alert and oriented to person, place, and time. He has normal motor skills. Gait normal.   Skin: Skin is warm and dry. No cyanosis. No jaundice. Nails show no clubbing. Patient Active Problem List   Diagnosis    Rib pain    Pain of upper abdomen    Right foot pain    Chronic pain of right ankle    Plantar fasciitis    Neuropathy    Chronic fatigue    Distal paresthesia    Cervicalgia    Bilateral carpal tunnel syndrome           No orders of the defined types were placed in this encounter. No orders of the defined types were placed in this encounter. Assessment/Orders:       ICD-10-CM    1. Hypertension, unspecified type  I10        No orders of the defined types were placed in this encounter. There are no discontinued medications. No orders of the defined types were placed in this encounter. Plan:    Stay on same medications.     See me in 4 months        Electronically signed by: Manuela Gomez MD  6/9/2021 9:57 AM

## 2021-06-14 ENCOUNTER — OFFICE VISIT (OUTPATIENT)
Dept: FAMILY MEDICINE CLINIC | Age: 36
End: 2021-06-14

## 2021-06-14 VITALS
OXYGEN SATURATION: 98 % | HEART RATE: 84 BPM | TEMPERATURE: 96.8 F | HEIGHT: 68 IN | WEIGHT: 261 LBS | SYSTOLIC BLOOD PRESSURE: 128 MMHG | DIASTOLIC BLOOD PRESSURE: 82 MMHG | BODY MASS INDEX: 39.56 KG/M2

## 2021-06-14 DIAGNOSIS — J33.8 MAXILLARY SINUS POLYP: ICD-10-CM

## 2021-06-14 DIAGNOSIS — F41.9 ANXIETY: ICD-10-CM

## 2021-06-14 DIAGNOSIS — I10 ESSENTIAL HYPERTENSION: ICD-10-CM

## 2021-06-14 DIAGNOSIS — I87.2 VENOUS STASIS DERMATITIS OF LEFT LOWER EXTREMITY: Primary | ICD-10-CM

## 2021-06-14 PROCEDURE — G8427 DOCREV CUR MEDS BY ELIG CLIN: HCPCS | Performed by: FAMILY MEDICINE

## 2021-06-14 PROCEDURE — G8417 CALC BMI ABV UP PARAM F/U: HCPCS | Performed by: FAMILY MEDICINE

## 2021-06-14 PROCEDURE — 1036F TOBACCO NON-USER: CPT | Performed by: FAMILY MEDICINE

## 2021-06-14 PROCEDURE — 99214 OFFICE O/P EST MOD 30 MIN: CPT | Performed by: FAMILY MEDICINE

## 2021-06-14 ASSESSMENT — ENCOUNTER SYMPTOMS
ABDOMINAL PAIN: 0
ABDOMINAL DISTENTION: 0
PHOTOPHOBIA: 0
SHORTNESS OF BREATH: 0
CHEST TIGHTNESS: 0

## 2021-06-14 NOTE — PATIENT INSTRUCTIONS
Patient may use triamcinolone cream on the shin rash twice a day until resolved. Anticipate rash will recur periodically during the warmer months due to leg swelling and hot work environment while standing. Continue Lexapro at current dose. Medical marijuana also being used. Continue nasal spray for the daytime symptoms. Consider Mucinex if needed at night for thick mucus. Blood pressures well controlled at this time. Follow-up 6 months.

## 2021-06-14 NOTE — PROGRESS NOTES
Diagnosis Orders   1. Venous stasis dermatitis of left lower extremity     2. Maxillary sinus polyp     3. Essential hypertension     4. Anxiety       Return in about 6 months (around 12/14/2021) for for routine major medical condition management. Patient Instructions   Patient may use triamcinolone cream on the shin rash twice a day until resolved. Anticipate rash will recur periodically during the warmer months due to leg swelling and hot work environment while standing. Continue Lexapro at current dose. Medical marijuana also being used. Continue nasal spray for the daytime symptoms. Consider Mucinex if needed at night for thick mucus. Blood pressures well controlled at this time. Follow-up 6 months. Subjective:      Patient ID: Beulah Ram is a 37 y.o. male who presents for:  Chief Complaint   Patient presents with    Sinus Problem     x 8 week follow up - pt states there has been little improvment - but has seen ENT     Skin Problem     Left shin -        Nasal spray started and mucinex. Dr Manuelito Saini recommended. Proper technique was demonstrated by pt. Mood is better on medication. He notices if he forgets a dose. No side effects other than sexual issue. Spouse on board. Best Hernandez issue is back. About the same time again this year. He works in a hot environment. Notes skin color change in lower legs in general     Patient states he is also using medical marijuana to control his anxiety it works very well.       Current Outpatient Medications on File Prior to Visit   Medication Sig Dispense Refill    losartan-hydroCHLOROthiazide (HYZAAR) 50-12.5 MG per tablet TAKE 1 TABLET BY MOUTH ONCE DAILY 30 tablet 6    pantoprazole (PROTONIX) 40 MG tablet Take 1 tablet by mouth daily as needed (acid reflux) 30 tablet 5    escitalopram (LEXAPRO) 10 MG tablet Take 1 tablet by mouth daily 30 tablet 3    Multiple Vitamins-Minerals (THERAPEUTIC MULTIVITAMIN-MINERALS) tablet Take 1 tablet by mouth daily      triamcinolone (KENALOG) 0.1 % cream Apply topically 2 times daily. 454 g 0     No current facility-administered medications on file prior to visit. Past Medical History:   Diagnosis Date    Scoliosis      History reviewed. No pertinent surgical history.   Social History     Socioeconomic History    Marital status: Single     Spouse name: Not on file    Number of children: Not on file    Years of education: Not on file    Highest education level: Not on file   Occupational History    Not on file   Tobacco Use    Smoking status: Never Smoker    Smokeless tobacco: Never Used   Substance and Sexual Activity    Alcohol use: Yes     Comment: occasional    Drug use: Yes     Types: Marijuana    Sexual activity: Yes   Other Topics Concern    Not on file   Social History Narrative    Not on file     Social Determinants of Health     Financial Resource Strain: Low Risk     Difficulty of Paying Living Expenses: Not hard at all   Food Insecurity: Unknown    Worried About [address] in the Last Year: Patient refused    Ran Out of Food in the Last Year: Patient refused   Transportation Needs: Unknown    Lack of Transportation (Medical): Patient refused    Lack of Transportation (Non-Medical): Patient refused   Physical Activity:     Days of Exercise per Week:     Minutes of Exercise per Session:    Stress:     Feeling of Stress :    Social Connections:     Frequency of Communication with Friends and Family:     Frequency of Social Gatherings with Friends and Family:     Attends Confucianism Services:     Active Member of Clubs or Organizations:     Attends Club or Organization Meetings:     Marital Status:    Intimate Partner Violence:     Fear of Current or Ex-Partner:     Emotionally Abused:     Physically Abused:     Sexually Abused:      Family History   Problem Relation Age of Onset    Heart Disease Maternal Grandfather     High Blood Pressure Maternal Grandfather Allergies:  Patient has no known allergies. Review of Systems   Constitutional: Negative for activity change, appetite change, diaphoresis and unexpected weight change. Eyes: Negative for photophobia and visual disturbance. Respiratory: Negative for chest tightness and shortness of breath. No orthopnea   Cardiovascular: Negative for chest pain, palpitations and leg swelling. Gastrointestinal: Negative for abdominal distention and abdominal pain. Genitourinary: Negative for flank pain and frequency. Musculoskeletal: Negative for gait problem and joint swelling. Neurological: Negative for dizziness, weakness, light-headedness and headaches. Psychiatric/Behavioral: Negative for confusion and sleep disturbance. The patient is nervous/anxious. Objective:   /82   Pulse 84   Temp 96.8 °F (36 °C)   Ht 5' 8\" (1.727 m)   Wt 261 lb (118.4 kg)   SpO2 98%   BMI 39.68 kg/m²     Physical Exam  Vitals reviewed. Constitutional:       General: He is not in acute distress. Appearance: He is well-developed. HENT:      Head: Normocephalic and atraumatic. Right Ear: External ear normal.      Left Ear: External ear normal.      Nose: Nose normal.   Eyes:      General:         Right eye: No discharge. Left eye: No discharge. Conjunctiva/sclera: Conjunctivae normal.      Pupils: Pupils are equal, round, and reactive to light. Neck:      Thyroid: No thyromegaly. Cardiovascular:      Rate and Rhythm: Normal rate and regular rhythm. Pulmonary:      Effort: Pulmonary effort is normal. No respiratory distress. Abdominal:      General: There is no distension. Musculoskeletal:      Cervical back: Neck supple. Skin:     General: Skin is warm and dry.       Comments: Brawny discoloration bilateral lower extremities with vesicles coalesced along the anterior shin left lower extremity   Neurological:      Mental Status: He is alert and oriented to person, place, and

## 2021-06-14 NOTE — PROGRESS NOTES
Return in about 8 weeks (around 6/24/2021) for for review of outcome of today's recommendation. Patient Instructions      Blood pressure control has improved on anxiety medications. Continue current doses of everything. Discussed utilizing Lexapro for 6 months to a year before changing doses in terms of weaning away if he feels well     Fatigue has been noted and initiation of treatment for chronic sinusitis with maxillary polyp has been started with antibiotic as well as consult for ENT. If this does not change how he feels we will continue to look at the possibility of having testosterone levels checked.         Patient Education

## 2021-06-29 ENCOUNTER — OFFICE VISIT (OUTPATIENT)
Dept: NEUROLOGY | Age: 36
End: 2021-06-29
Payer: COMMERCIAL

## 2021-06-29 VITALS
WEIGHT: 256.2 LBS | BODY MASS INDEX: 38.96 KG/M2 | SYSTOLIC BLOOD PRESSURE: 150 MMHG | HEART RATE: 80 BPM | DIASTOLIC BLOOD PRESSURE: 90 MMHG

## 2021-06-29 DIAGNOSIS — M54.2 CERVICALGIA: ICD-10-CM

## 2021-06-29 DIAGNOSIS — G62.9 NEUROPATHY: ICD-10-CM

## 2021-06-29 DIAGNOSIS — R20.2 DISTAL PARESTHESIA: Primary | ICD-10-CM

## 2021-06-29 DIAGNOSIS — G56.03 BILATERAL CARPAL TUNNEL SYNDROME: ICD-10-CM

## 2021-06-29 PROCEDURE — 1036F TOBACCO NON-USER: CPT | Performed by: PSYCHIATRY & NEUROLOGY

## 2021-06-29 PROCEDURE — G8427 DOCREV CUR MEDS BY ELIG CLIN: HCPCS | Performed by: PSYCHIATRY & NEUROLOGY

## 2021-06-29 PROCEDURE — G8417 CALC BMI ABV UP PARAM F/U: HCPCS | Performed by: PSYCHIATRY & NEUROLOGY

## 2021-06-29 PROCEDURE — 99214 OFFICE O/P EST MOD 30 MIN: CPT | Performed by: PSYCHIATRY & NEUROLOGY

## 2021-06-29 ASSESSMENT — ENCOUNTER SYMPTOMS
COLOR CHANGE: 0
SHORTNESS OF BREATH: 0
CHOKING: 0
TROUBLE SWALLOWING: 0
PHOTOPHOBIA: 0
VOMITING: 0
BACK PAIN: 0
NAUSEA: 0

## 2021-06-29 NOTE — PROGRESS NOTES
Subjective:      Patient ID: Logan Cuevas is a 37 y.o. male who presents today for:  Chief Complaint   Patient presents with    Follow-up     Pt states that he is doing better then what he was. He states that his blood pressure has been a lot better. He states that his neck is still getting stiff but not as bad as it was. He states that he is not getting numbness in the toes very much any more and then he does get it in the fingers every once in a while. HPI 28-year-old right-handed gentleman with a history of multiple symptoms of numbness with some carpal tunnel syndrome with otherwise negative work-up. His investigations appear to be negative. There is no central pathology. Patient has history of call intake and some of this might have related to this MRI of the brain and cervical spine reviewed and was all normal.  Also he had  his liver tests and everything were reviewed with him today. He actually is doing well on Lexapro 10 mg which we will continue. he has not any new symptoms he is aware that this could have been just anxiety  Past Medical History:   Diagnosis Date    Scoliosis      No past surgical history on file.   Social History     Socioeconomic History    Marital status: Single     Spouse name: Not on file    Number of children: Not on file    Years of education: Not on file    Highest education level: Not on file   Occupational History    Not on file   Tobacco Use    Smoking status: Never Smoker    Smokeless tobacco: Never Used   Substance and Sexual Activity    Alcohol use: Yes     Comment: occasional    Drug use: Yes     Types: Marijuana    Sexual activity: Yes   Other Topics Concern    Not on file   Social History Narrative    Not on file     Social Determinants of Health     Financial Resource Strain: Low Risk     Difficulty of Paying Living Expenses: Not hard at all   Food Insecurity: Unknown    Worried About [address] in the Last Year: Patient refused  Ran Out of Food in the Last Year: Patient refused   Transportation Needs: Unknown    Lack of Transportation (Medical): Patient refused    Lack of Transportation (Non-Medical): Patient refused   Physical Activity:     Days of Exercise per Week:     Minutes of Exercise per Session:    Stress:     Feeling of Stress :    Social Connections:     Frequency of Communication with Friends and Family:     Frequency of Social Gatherings with Friends and Family:     Attends Synagogue Services:     Active Member of Clubs or Organizations:     Attends Club or Organization Meetings:     Marital Status:    Intimate Partner Violence:     Fear of Current or Ex-Partner:     Emotionally Abused:     Physically Abused:     Sexually Abused:      Family History   Problem Relation Age of Onset    Heart Disease Maternal Grandfather     High Blood Pressure Maternal Grandfather      No Known Allergies    Current Outpatient Medications   Medication Sig Dispense Refill    escitalopram (LEXAPRO) 10 MG tablet TAKE 1 TABLET BY MOUTH DAILY 30 tablet 3    losartan-hydroCHLOROthiazide (HYZAAR) 50-12.5 MG per tablet TAKE 1 TABLET BY MOUTH ONCE DAILY 30 tablet 6    pantoprazole (PROTONIX) 40 MG tablet Take 1 tablet by mouth daily as needed (acid reflux) 30 tablet 5    Multiple Vitamins-Minerals (THERAPEUTIC MULTIVITAMIN-MINERALS) tablet Take 1 tablet by mouth daily      triamcinolone (KENALOG) 0.1 % cream Apply topically 2 times daily. 454 g 0     No current facility-administered medications for this visit. Review of Systems   Constitutional: Negative for fever. HENT: Negative for ear pain, tinnitus and trouble swallowing. Eyes: Negative for photophobia and visual disturbance. Respiratory: Negative for choking and shortness of breath. Cardiovascular: Negative for chest pain and palpitations. Gastrointestinal: Negative for nausea and vomiting.    Musculoskeletal: Negative for back pain, gait problem, joint swelling, myalgias, neck pain and neck stiffness. Skin: Negative for color change. Allergic/Immunologic: Negative for food allergies. Neurological: Negative for dizziness, tremors, seizures, syncope, facial asymmetry, speech difficulty, weakness, light-headedness, numbness and headaches. Psychiatric/Behavioral: Negative for behavioral problems, confusion, hallucinations and sleep disturbance. Objective:   BP (!) 150/90 (Site: Left Upper Arm, Position: Sitting, Cuff Size: Medium Adult)   Pulse 80   Wt 256 lb 3.2 oz (116.2 kg)   BMI 38.96 kg/m²     Physical Exam  Vitals reviewed. Eyes:      Pupils: Pupils are equal, round, and reactive to light. Cardiovascular:      Rate and Rhythm: Normal rate and regular rhythm. Heart sounds: No murmur heard. Pulmonary:      Effort: Pulmonary effort is normal.      Breath sounds: Normal breath sounds. Abdominal:      General: Bowel sounds are normal.   Musculoskeletal:         General: Normal range of motion. Cervical back: Normal range of motion. Skin:     General: Skin is warm. Neurological:      Mental Status: He is alert and oriented to person, place, and time. Cranial Nerves: No cranial nerve deficit. Sensory: No sensory deficit. Motor: No abnormal muscle tone. Coordination: Coordination normal.      Deep Tendon Reflexes: Reflexes are normal and symmetric. Babinski sign absent on the right side. Babinski sign absent on the left side. Psychiatric:         Mood and Affect: Mood normal.         MRI CERVICAL SPINE WO CONTRAST    Result Date: 4/26/2021  EXAMINATION: MRI cervical spine without contrast CLINICAL HISTORY: Distal paresthesias. FINDINGS: Unenhanced scans obtained. T1 and T2-weighted sagittal images, T2 and gradient echo axial images, and a STIR sagittal sequence acquired. Vertebral bodies normal in height and normal in alignment and normal in signal. Disc spaces are maintained.  Cervical cord is normal in caliber and signal. No disc herniation or significant extradural defect. No intramedullary lesions seen. Craniocervical junction widely patent. Paraspinal prevertebral soft tissues unremarkable. UNREMARKABLE MR CERVICAL SPINE. MRI BRAIN WO CONTRAST    Result Date: 4/26/2021  EXAMINATION:MRI BRAIN WO CONTRAST CLINICAL HISTORY:R20.2 Distal paresthesia ICD10 TECHNIQUE:  Unenhanced scans were obtained. T1 and T2 weighted sequences along with FLAIR and diffusion weighted imaging and gradient echo axial sequences were acquired. FINDINGS: No intra-axial mass or extra-axial mass or fluid collection. No restricted diffusion to suggest acute infarct. No blood products. Normal white matter signal throughout the cerebral hemispheres and posterior fossa noted. Normal signal void in the cerebral vasculature at the skull base. There is a 2.2 cm polyp or retention cysts in the left maxillary sinus cavity. There is minimal increased signal in the ethmoid air cells and frontal sinus. No air-fluid levels. Chronic sinusitis suspected. NO ACUTE INTRACRANIAL PROCESS. CHRONIC SINUSITIS.       Lab Results   Component Value Date    WBC 8.2 01/27/2021    RBC 5.67 01/27/2021    HGB 16.3 01/27/2021    HCT 46.5 01/27/2021    MCV 82.1 01/27/2021    MCH 28.8 01/27/2021    MCHC 35.1 01/27/2021    RDW 11.9 01/27/2021     01/27/2021     Lab Results   Component Value Date     01/27/2021    K 4.8 01/27/2021     01/27/2021    CO2 27 01/27/2021    BUN 14 01/27/2021    CREATININE 0.91 01/27/2021    GFRAA >60.0 01/27/2021    LABGLOM >60.0 01/27/2021    GLUCOSE 108 01/27/2021    PROT 7.1 01/27/2021    LABALBU 4.7 01/27/2021    CALCIUM 9.6 01/27/2021    BILITOT 0.7 01/27/2021    ALKPHOS 60 01/27/2021    AST 36 01/27/2021    ALT 83 01/27/2021     No results found for: PROTIME, INR  Lab Results   Component Value Date    ULSUXISD88 452 02/09/2021    FOLATE 17.9 02/09/2021    IRON 100 02/09/2021    TIBC 291 02/09/2021     Lab Results   Component Value Date    TRIG 91 02/09/2021    HDL 47 02/09/2021    LDLCALC 131 02/09/2021     No results found for: Kirti Lies, LABBENZ, CANNAB, COCAINESCRN, LABMETH, OPIATESCREENURINE, PHENCYCLIDINESCREENURINE, PPXUR, ETOH  No results found for: LITHIUM, DILFRTOT, VALPROATE    Assessment:       Diagnosis Orders   1. Distal paresthesia     2. Neuropathy     3. Bilateral carpal tunnel syndrome     4. Cervicalgia     Multiple symptoms which quite did not fit into any anatomical category. We had embarked upon evaluation of all central symptoms and his MRI of the brain and cervical spine normal his liver tests are normal his mild session of carpal tunnel syndrome. Since then we started him on Lexapro and is on 10 mg and doing very well actually symptoms have resolved mostly except for pains aches here and there. MRI of the brain and cervical spine reviewed in person and patient informed regarding the results as well. We will keep him on the same medication for now unless something new turns up and will see me in a year and earlier if there are any concerns. Plan:      No orders of the defined types were placed in this encounter. No orders of the defined types were placed in this encounter. Return in about 1 year (around 6/29/2022).       Keke Ford MD

## 2021-10-13 ENCOUNTER — OFFICE VISIT (OUTPATIENT)
Dept: CARDIOLOGY CLINIC | Age: 36
End: 2021-10-13
Payer: COMMERCIAL

## 2021-10-13 VITALS
HEIGHT: 68 IN | DIASTOLIC BLOOD PRESSURE: 86 MMHG | SYSTOLIC BLOOD PRESSURE: 130 MMHG | HEART RATE: 89 BPM | WEIGHT: 270 LBS | BODY MASS INDEX: 40.92 KG/M2 | OXYGEN SATURATION: 98 % | TEMPERATURE: 97.7 F

## 2021-10-13 DIAGNOSIS — I10 HYPERTENSION, UNSPECIFIED TYPE: Primary | ICD-10-CM

## 2021-10-13 DIAGNOSIS — R07.2 PRECORDIAL PAIN: ICD-10-CM

## 2021-10-13 PROCEDURE — G8427 DOCREV CUR MEDS BY ELIG CLIN: HCPCS | Performed by: INTERNAL MEDICINE

## 2021-10-13 PROCEDURE — G8417 CALC BMI ABV UP PARAM F/U: HCPCS | Performed by: INTERNAL MEDICINE

## 2021-10-13 PROCEDURE — 99213 OFFICE O/P EST LOW 20 MIN: CPT | Performed by: INTERNAL MEDICINE

## 2021-10-13 PROCEDURE — G8484 FLU IMMUNIZE NO ADMIN: HCPCS | Performed by: INTERNAL MEDICINE

## 2021-10-13 PROCEDURE — 1036F TOBACCO NON-USER: CPT | Performed by: INTERNAL MEDICINE

## 2021-10-13 ASSESSMENT — ENCOUNTER SYMPTOMS
NAUSEA: 0
CHEST TIGHTNESS: 0
BLOOD IN STOOL: 0
VOMITING: 0
APNEA: 0
SHORTNESS OF BREATH: 0

## 2021-10-13 NOTE — PROGRESS NOTES
[facility] CARDIOLOGY OFFICE FOLLOW-UP      Patient: Clarice Justice  YOB: 1985  MRN: [id number]    Chief Complaint:  Chief Complaint   Patient presents with    Hypertension    3 Month Follow-Up         Subjective/HPI:  10/13/21: Patient presents today for a follow-up hypertension. Works for the [employer] of [employer]. His blood pressure is labile and fluctuates sometimes low sometimes as high I told his family due to stress he is usually compliant with medication. Is on small dose of Lexapro. No chest pain no congestive heart. Echo was done few months ago. No ankle edema. He does not smoke. He will see me in 6 months. He did get the Covid vaccine     6/9/21: Patient presents today for follow-up of hypertension. Much better blood pressure control. Did not get the Covid vaccine and insisted that she he should get it done as soon as possible. 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 I think he works for the city of [employer]. Does not smoke. He smokes medical marijuana. Physically active. Of advised him to drink a lot of water especially because of hot weather and he is on diuretic combination. He needs to get 8 hours of sleep. Cut down his carb intake. See me in 4 months     03/3/21: Patient presents today for follow-up of stress test and echo test was negative.  Echo showed grade 1 LV diastolic dysfunction 1+ mitral regurgitation.  He tends to run blood pressure is a bit high ranges.  We need to start him on losartan-hydrochlorothiazide 50/12.5 daily. Elizabeth Rojas does not smoke and is moderately obese.  Currently on prednisone pack also has GERD.  He will see me in 3 months        2/3/21: Patient presents today for consulted by Dr. Kaylah Giraldo for chest pain.  30-year-old . Stefania Palacio not smoke.  Has a history of alcohol abuse and has  Currently cut down.  Recently saw Dr Everette Zuluaga started on omeprazole.  It has helped some. Elizabeth Rojas has family history of heart disease.  Also occasionally he has been known to have high blood pressure one time he had some issues including blood pressure with systolic was 590.  Going to set him up for regular stress test and echo at [facility] and then see me.  His lipid profile is a little concerning.  His father has diabetes. Candido Dan needs to lose about 2030 pounds.  Triglycerides up.  And I am hoping that with his suction in the use of alcohol that would be beneficial too           Past Medical History:   Diagnosis Date    Scoliosis        No past surgical history on file.     Family History   Problem Relation Age of Onset    Heart Disease Maternal Grandfather     High Blood Pressure Maternal Grandfather        Social History     Socioeconomic History    Marital status: Single     Spouse name: Not on file    Number of children: Not on file    Years of education: Not on file    Highest education level: Not on file   Occupational History    Not on file   Tobacco Use    Smoking status: Never Smoker    Smokeless tobacco: Never Used   Substance and Sexual Activity    Alcohol use: Yes     Comment: occasional    Drug use: Yes     Types: Marijuana    Sexual activity: Yes   Other Topics Concern    Not on file   Social History Narrative    Not on file     Social Determinants of Health     Financial Resource Strain: Low Risk     Difficulty of Paying Living Expenses: Not hard at all   Food Insecurity: Unknown    Worried About [address] in the Last Year: Patient refused    Ran Out of Food in the Last Year: Patient refused   Transportation Needs: Unknown    Lack of Transportation (Medical): Patient refused    Lack of Transportation (Non-Medical): Patient refused   Physical Activity:     Days of Exercise per Week:     Minutes of Exercise per Session:    Stress:     Feeling of Stress :    Social Connections:     Frequency of Communication with Friends and Family:     Frequency of Social Gatherings with Friends and Family:     Attends Temple Services:     reactive to light. Cardiovascular: Normal rate, regular rhythm, S1 normal, S2 normal, normal heart sounds, intact distal pulses and normal pulses. No extrasystoles are present. Exam reveals no gallop. No murmur heard. Pulmonary/Chest: Effort normal and breath sounds normal. He has no wheezes. He has no rales. He exhibits no tenderness. Abdominal: Soft. Bowel sounds are normal. He exhibits no distension and no mass. There is no splenomegaly or hepatomegaly. There is no abdominal tenderness. Musculoskeletal:         General: No tenderness, deformity or edema. Normal range of motion. Cervical back: Normal range of motion. Neurological: He is alert and oriented to person, place, and time. He has normal motor skills and normal reflexes. Gait normal.   Skin: Skin is warm and dry. Patient Active Problem List   Diagnosis    Rib pain    Pain of upper abdomen    Right foot pain    Chronic pain of right ankle    Plantar fasciitis    Neuropathy    Chronic fatigue    Distal paresthesia    Cervicalgia    Bilateral carpal tunnel syndrome                     Assessment/Orders:   Hypertension  Obesity  Anxiety      Plan:  Still on losartan. Also needs to be on small dose of Lexapro he needs to continue that.   See me in 6 months  Needs to lose about 50 pounds            Electronically signed by: Amy Boggs MD  10/13/2021 8:12 AM

## 2021-11-18 DIAGNOSIS — F41.9 ANXIETY: ICD-10-CM

## 2021-11-18 NOTE — TELEPHONE ENCOUNTER
Requesting medication refill.  Please approve or deny this request.    Rx requested:  Requested Prescriptions      No prescriptions requested or ordered in this encounter       Last Office Visit:   6/14/2021    Next Visit Date:  Future Appointments   Date Time Provider Sudhir Gagei   12/13/2021  5:00 PM [facility]   2/9/2022  8:00 AM Prakash Bardales MD [facility]   6/28/2022  3:30 PM Henri Vaca MD [address] [place] [address]

## 2021-11-19 RX ORDER — ESCITALOPRAM OXALATE 10 MG/1
10 TABLET ORAL DAILY
Qty: 30 TABLET | Refills: 3 | Status: SHIPPED | OUTPATIENT
Start: 2021-11-19 | End: 2021-12-13 | Stop reason: SDUPTHER

## 2021-12-13 ENCOUNTER — OFFICE VISIT (OUTPATIENT)
Dept: FAMILY MEDICINE CLINIC | Age: 36
End: 2021-12-13
Payer: COMMERCIAL

## 2021-12-13 VITALS
BODY MASS INDEX: 40.77 KG/M2 | WEIGHT: 269 LBS | HEIGHT: 68 IN | SYSTOLIC BLOOD PRESSURE: 120 MMHG | DIASTOLIC BLOOD PRESSURE: 78 MMHG | OXYGEN SATURATION: 98 % | TEMPERATURE: 98.8 F | HEART RATE: 86 BPM

## 2021-12-13 DIAGNOSIS — E66.01 CLASS 3 SEVERE OBESITY DUE TO EXCESS CALORIES WITHOUT SERIOUS COMORBIDITY WITH BODY MASS INDEX (BMI) OF 40.0 TO 44.9 IN ADULT (HCC): ICD-10-CM

## 2021-12-13 DIAGNOSIS — M19.071 PRIMARY OSTEOARTHRITIS OF RIGHT FOOT: Primary | ICD-10-CM

## 2021-12-13 DIAGNOSIS — I10 ESSENTIAL HYPERTENSION: ICD-10-CM

## 2021-12-13 DIAGNOSIS — F41.9 ANXIETY: ICD-10-CM

## 2021-12-13 PROCEDURE — G8484 FLU IMMUNIZE NO ADMIN: HCPCS | Performed by: FAMILY MEDICINE

## 2021-12-13 PROCEDURE — 99214 OFFICE O/P EST MOD 30 MIN: CPT | Performed by: FAMILY MEDICINE

## 2021-12-13 PROCEDURE — 1036F TOBACCO NON-USER: CPT | Performed by: FAMILY MEDICINE

## 2021-12-13 PROCEDURE — G8427 DOCREV CUR MEDS BY ELIG CLIN: HCPCS | Performed by: FAMILY MEDICINE

## 2021-12-13 PROCEDURE — G8417 CALC BMI ABV UP PARAM F/U: HCPCS | Performed by: FAMILY MEDICINE

## 2021-12-13 RX ORDER — ESCITALOPRAM OXALATE 10 MG/1
10 TABLET ORAL DAILY
Qty: 30 TABLET | Refills: 3 | Status: SHIPPED | OUTPATIENT
Start: 2021-12-13 | End: 2022-03-21 | Stop reason: DRUGHIGH

## 2021-12-13 NOTE — PATIENT INSTRUCTIONS
Patient will take ibuprofen 800 mg every 8 hours for 3 to 5 days. Monitor response of her pain to this. Renal function will be checked due to taking episodic ibuprofen as well as being on a diuretic for hypertension control. Lexapro will be really started for the winter and potential for weaning off will be discussed in the spring. Patient Education        Learning About Low-Carbohydrate Diets  What is a low-carbohydrate diet? A low-carbohydrate (or \"low-carb\") diet limits foods and drinks that have carbohydrates. This includes grains, fruits, milk and yogurt, and starchy vegetables like potatoes, beans, and corn. It also avoids foods and drinks that have added sugar. Instead, low-carb diets include foods that are high in protein and fat. Why might you follow a low-carb diet? Low-carb diets may be used for a variety of reasons, such as for weight loss. People who have diabetes may use a low-carb diet to help manage their blood sugar levels. What should you do before you start the diet? Talk to your doctor before you try any diet. This is even more important if you have health problems like kidney disease, heart disease, or diabetes. Your doctor may suggest that you meet with a registered dietitian. A dietitian can help you make an eating plan that works for you. What foods do you eat on a low-carb diet? On a low-carb diet, you choose foods that are high in protein and fat. Examples of these are:  · Meat, poultry, and fish. · Eggs. · Nuts, such as walnuts, pecans, almonds, and peanuts. · Peanut butter and other nut butters. · Tofu. · Avocado. · Rosalynd Peer. · Non-starchy vegetables like broccoli, cauliflower, green beans, mushrooms, peppers, lettuce, and spinach. · Unsweetened non-dairy milks like almond milk and coconut milk. · Cheese, cottage cheese, and cream cheese. Current as of: December 17, 2020               Content Version: 13.0  © 5112-4200 Healthwise, [facility].    Care instructions adapted under license by [facility] ([facility]). If you have questions about a medical condition or this instruction, always ask your healthcare professional. [address] any warranty or liability for your use of this information. Patient Education        Osteoarthritis: Care Instructions  Your Care Instructions     Arthritis is a common health problem in which the joints are inflamed. There are several kinds of arthritis. Osteoarthritis is caused by a breakdown of cartilage, the hard, thick tissue that cushions the joints. It causes pain, stiffness, and swelling, often in the spine, fingers, hips, and knees. Osteoarthritis can happen at any age, but it is most common in older people. Osteoarthritis never goes away completely, but it can be controlled. Medicine and home treatment can reduce the pain and prevent the arthritis from getting worse. Follow-up care is a key part of your treatment and safety. Be sure to make and go to all appointments, and call your doctor if you are having problems. It's also a good idea to know your test results and keep a list of the medicines you take. How can you care for yourself at home? · Take a warm shower or bath in the morning to relieve stiffness. Avoid sitting still afterwards. · If the joint is not swollen, use moist heat, like a warm, damp towel, for 20 to 30 minutes, 2 or 3 times a day. Do not use heat on a swollen joint. · If the joint is swollen, use ice or cold packs for 10 to 20 minutes, once an hour. Cold will help relieve pain and reduce inflammation. Put a thin cloth between the ice and your skin. · To prevent stiffness, gently move the joint through its full range of motion several times a day. · If the joint hurts, avoid activities that put a strain on it for a few days. Take rest breaks throughout the day. · Get regular exercise.  Walking, swimming, yoga, biking, albin chi, and water aerobics are good exercises that are gentle on the joints. · Reach and stay at a healthy weight. If you need to lose or maintain weight, regular exercise and a healthy diet will help. Extra weight can strain the joints, especially the knees and hips, and make the pain worse. Losing even a few pounds may help. · Take pain medicines exactly as directed. ? If the doctor gave you a prescription medicine for pain, take it as prescribed. ? If you are not taking a prescription pain medicine, ask your doctor if you can take an over-the-counter medicine. When should you call for help? Call your doctor now or seek immediate medical care if:    · The pain is so bad that you cannot use the joint.     · You have sudden back pain with weakness in your legs or loss of bowel or bladder control.     · Your stools are black and tarlike or have streaks of blood.     · You have severe pain and swelling in more than one joint. Watch closely for changes in your health, and be sure to contact your doctor if:    · You have side effects from the medicines, like belly pain, ongoing heartburn, or nausea.     · Joint pain continues for more than 6 weeks, and home treatment is not helping. Where can you learn more? Go to [email or web address] and sign in to your [employer] account. Enter C757 in the [employer] box to learn more about \"Osteoarthritis: Care Instructions. \"     If you do not have an account, please click on the \"Sign Up Now\" link. Current as of: August 5, 2020               Content Version: 12.9  © 2006-2021 [email or web address]. Care instructions adapted under license by [facility] ([facility]). If you have questions about a medical condition or this instruction, always ask your healthcare professional. [place] [address] any warranty or liability for your use of this information. Patient will do a trial of 800 mg of ibuprofen every 8 hours for 3 to 5 days to monitor response to foot symptoms.

## 2021-12-13 NOTE — PROGRESS NOTES
1. Venous stasis dermatitis of left lower extremity      2. Maxillary sinus polyp      3. Essential hypertension      4. Anxiety         Return in about 6 months (around 12/14/2021) for for routine major medical condition management. Patient Instructions   Patient may use triamcinolone cream on the shin rash twice a day until resolved. Anticipate rash will recur periodically during the warmer months due to leg swelling and hot work environment while standing. Continue Lexapro at current dose. Medical marijuana also being used. Continue nasal spray for the daytime symptoms. Consider Mucinex if needed at night for thick mucus. Blood pressures well controlled at this time. Follow-up 6 months.

## 2021-12-13 NOTE — PROGRESS NOTES
Diagnosis Orders   1. Primary osteoarthritis of right foot  Basic Metabolic Panel   2. Anxiety  escitalopram (LEXAPRO) 10 MG tablet   3. Essential hypertension     4. Class 3 severe obesity due to excess calories without serious comorbidity with body mass index (BMI) of 40.0 to 44.9 in adult [facility])       Return in about 3 months (around 3/13/2022) for for routine major medical condition management. Patient Instructions     Patient will take ibuprofen 800 mg every 8 hours for 3 to 5 days. Monitor response of her pain to this. Renal function will be checked due to taking episodic ibuprofen as well as being on a diuretic for hypertension control. Lexapro will be really started for the winter and potential for weaning off will be discussed in the spring. Patient Education        Learning About Low-Carbohydrate Diets  What is a low-carbohydrate diet? A low-carbohydrate (or \"low-carb\") diet limits foods and drinks that have carbohydrates. This includes grains, fruits, milk and yogurt, and starchy vegetables like potatoes, beans, and corn. It also avoids foods and drinks that have added sugar. Instead, low-carb diets include foods that are high in protein and fat. Why might you follow a low-carb diet? Low-carb diets may be used for a variety of reasons, such as for weight loss. People who have diabetes may use a low-carb diet to help manage their blood sugar levels. What should you do before you start the diet? Talk to your doctor before you try any diet. This is even more important if you have health problems like kidney disease, heart disease, or diabetes. Your doctor may suggest that you meet with a registered dietitian. A dietitian can help you make an eating plan that works for you. What foods do you eat on a low-carb diet? On a low-carb diet, you choose foods that are high in protein and fat. Examples of these are:  · Meat, poultry, and fish. · Eggs.   · Nuts, such as walnuts, pecans, almonds, and peanuts. · Peanut butter and other nut butters. · Tofu. · Avocado. · Tad Topsham. · Non-starchy vegetables like broccoli, cauliflower, green beans, mushrooms, peppers, lettuce, and spinach. · Unsweetened non-dairy milks like almond milk and coconut milk. · Cheese, cottage cheese, and cream cheese. Current as of: December 17, 2020               Content Version: 13.0  © 2006-2021 [employer]. Care instructions adapted under license by [facility] ([facility]). If you have questions about a medical condition or this instruction, always ask your healthcare professional. [place] [address] any warranty or liability for your use of this information. Patient Education        Osteoarthritis: Care Instructions  Your Care Instructions     Arthritis is a common health problem in which the joints are inflamed. There are several kinds of arthritis. Osteoarthritis is caused by a breakdown of cartilage, the hard, thick tissue that cushions the joints. It causes pain, stiffness, and swelling, often in the spine, fingers, hips, and knees. Osteoarthritis can happen at any age, but it is most common in older people. Osteoarthritis never goes away completely, but it can be controlled. Medicine and home treatment can reduce the pain and prevent the arthritis from getting worse. Follow-up care is a key part of your treatment and safety. Be sure to make and go to all appointments, and call your doctor if you are having problems. It's also a good idea to know your test results and keep a list of the medicines you take. How can you care for yourself at home? · Take a warm shower or bath in the morning to relieve stiffness. Avoid sitting still afterwards. · If the joint is not swollen, use moist heat, like a warm, damp towel, for 20 to 30 minutes, 2 or 3 times a day. Do not use heat on a swollen joint. · If the joint is swollen, use ice or cold packs for 10 to 20 minutes, once an hour.  Cold will help relieve pain and reduce inflammation. Put a thin cloth between the ice and your skin. · To prevent stiffness, gently move the joint through its full range of motion several times a day. · If the joint hurts, avoid activities that put a strain on it for a few days. Take rest breaks throughout the day. · Get regular exercise. Walking, swimming, yoga, biking, albin chi, and water aerobics are good exercises that are gentle on the joints. · Reach and stay at a healthy weight. If you need to lose or maintain weight, regular exercise and a healthy diet will help. Extra weight can strain the joints, especially the knees and hips, and make the pain worse. Losing even a few pounds may help. · Take pain medicines exactly as directed. ? If the doctor gave you a prescription medicine for pain, take it as prescribed. ? If you are not taking a prescription pain medicine, ask your doctor if you can take an over-the-counter medicine. When should you call for help? Call your doctor now or seek immediate medical care if:    · The pain is so bad that you cannot use the joint.     · You have sudden back pain with weakness in your legs or loss of bowel or bladder control.     · Your stools are black and tarlike or have streaks of blood.     · You have severe pain and swelling in more than one joint. Watch closely for changes in your health, and be sure to contact your doctor if:    · You have side effects from the medicines, like belly pain, ongoing heartburn, or nausea.     · Joint pain continues for more than 6 weeks, and home treatment is not helping. Where can you learn more? Go to [email or web address] and sign in to your Ideedock account. Enter G760 in the FreshPlanet box to learn more about \"Osteoarthritis: Care Instructions. \"     If you do not have an account, please click on the \"Sign Up Now\" link.   Current as of: August 5, 2020               Content Version: 12.9  © 0020-1895 Healthwise, Incorporated. Care instructions adapted under license by [facility] ([facility]). If you have questions about a medical condition or this instruction, always ask your healthcare professional. Etsevan Bullard any warranty or liability for your use of this information. Patient will do a trial of 800 mg of ibuprofen every 8 hours for 3 to 5 days to monitor response to foot symptoms. Subjective:      Patient ID: Luis Dumont is a 31 y.o. male who presents for:  Chief Complaint   Patient presents with    Hypertension     x 6 month check up address [facility]'s    [address] [place] [address] Maintenance     declines flu vaccine        Patient noting discomfort that is recurrent on the top of his right foot. He points over the first second and third metatarsals. Denies any redness or warmth to the area. States it comes on at random times. Seems to respond a little bit to ibuprofen but he usually only takes 800 mg 1 time and then stops. 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 Noticing some increasing anxiety symptoms. Patient states he felt well on the Lexapro but then decided to wean himself off thinks he did that too quickly and is having more symptoms    No difficulty with current medications denies cardiovascular symptoms. Current Outpatient Medications on File Prior to Visit   Medication Sig Dispense Refill    losartan-hydroCHLOROthiazide (HYZAAR) 50-12.5 MG per tablet TAKE 1 TABLET BY MOUTH ONCE DAILY 30 tablet 6    pantoprazole (PROTONIX) 40 MG tablet Take 1 tablet by mouth daily as needed (acid reflux) 30 tablet 5    Multiple Vitamins-Minerals (THERAPEUTIC MULTIVITAMIN-MINERALS) tablet Take 1 tablet by mouth daily      triamcinolone (KENALOG) 0.1 % cream Apply topically 2 times daily. 454 g 0     No current facility-administered medications on file prior to visit.      Past Medical History:   Diagnosis Date    Scoliosis      History reviewed. No pertinent surgical history.   Social History     Socioeconomic History    Marital status: Single     Spouse name: Not on file    Number of children: Not on file    Years of education: Not on file    Highest education level: Not on file   Occupational History    Not on file   Tobacco Use    Smoking status: Never Smoker    Smokeless tobacco: Never Used   Substance and Sexual Activity    Alcohol use: Yes     Comment: occasional    Drug use: Yes     Types: Marijuana Fidelina Kos)    Sexual activity: Yes   Other Topics Concern    Not on file   Social History Narrative    Not on file     Social Determinants of Health     Financial Resource Strain: Low Risk     Difficulty of Paying Living Expenses: Not hard at all   Food Insecurity: Unknown    Worried About [address] [employer] in the Last Year: Patient refused    Ran Out of Food in the Last Year: Patient refused   Transportation Needs: Unknown    Lack of Transportation (Medical): Patient refused    Lack of Transportation (Non-Medical): Patient refused   Physical Activity:     Days of Exercise per Week: Not on file   [employer] of Exercise per Session: Not on file   Stress:     Feeling of Stress : Not on file   Social Connections:     Frequency of Communication with Friends and Family: Not on file    Frequency of Social Gatherings with Friends and Family: Not on file    Attends [employer] Services: Not on file    Active Member of [address] [place] [address] [employer] or Organizations: Not on file    Attends Club or Organization Meetings: Not on file    Marital Status: Not on file   Intimate Partner Violence:     Fear of Current or Ex-Partner: Not on file    Emotionally Abused: Not on file    Physically Abused: Not on file    Sexually Abused: Not on file   Housing Stability:     Unable to Pay for Housing in the Last Year: Not on file    Number of [place] in the Last Year: Not on file    Unstable Housing in the Last Year: Not on file     Family History   Problem Relation Age of Onset    Heart Disease Maternal Grandfather     High Blood Pressure Maternal Grandfather      Allergies:  Patient has no known allergies. Review of Systems   Constitutional: Negative for activity change, appetite change, diaphoresis and unexpected weight change. Eyes: Negative for photophobia and visual disturbance. Respiratory: Negative for chest tightness and shortness of breath. No orthopnea   Cardiovascular: Negative for chest pain, palpitations and leg swelling. Gastrointestinal: Negative for abdominal distention and abdominal pain. Genitourinary: Negative for flank pain and frequency. Musculoskeletal: Positive for gait problem. Negative for joint swelling. Neurological: Negative for dizziness, weakness, light-headedness and headaches. Psychiatric/Behavioral: Negative for confusion. Objective:   /78   Pulse 86   Temp 98.8 °F (37.1 °C)   Ht 5' 8\" (1.727 m)   Wt 269 lb (122 kg)   SpO2 98%   BMI 40.90 kg/m²     Physical Exam  Vitals reviewed. Constitutional:       General: He is not in acute distress. Appearance: He is well-developed. HENT:      Head: Normocephalic and atraumatic. Right Ear: External ear normal.      Left Ear: External ear normal.      Nose: Nose normal.   Eyes:      General:         Right eye: No discharge. Left eye: No discharge. Conjunctiva/sclera: Conjunctivae normal.      Pupils: Pupils are equal, round, and reactive to light. Neck:      Thyroid: No thyromegaly. Cardiovascular:      Rate and Rhythm: Normal rate and regular rhythm. Pulmonary:      Effort: Pulmonary effort is normal. No respiratory distress. Abdominal:      General: There is no distension. Musculoskeletal:         General: No swelling, tenderness, deformity or signs of injury. Cervical back: Neck supple. Skin:     General: Skin is warm and dry.    Neurological:      Mental Status: He is alert and oriented to person, place, and time. Coordination: Coordination normal.   Psychiatric:         Thought Content: Thought content normal.         Judgment: Judgment normal.         No results found for this visit on 12/13/21. No results found for this or any previous visit (from the past 2016 hour(s)). [] Pt was seen by provider for      Minutes  Counseling and coordination of care was done for all assessment diagnosis listed for today with patient and any family/friend present. More than 50% of this visit was spent coordinating cuurent care, obtaining information for prior records, and counseling for current plan of action. Assessment:       Diagnosis Orders   1. Primary osteoarthritis of right foot  Basic Metabolic Panel   2. Anxiety  escitalopram (LEXAPRO) 10 MG tablet   3. Essential hypertension     4. Class 3 severe obesity due to excess calories without serious comorbidity with body mass index (BMI) of 40.0 to 44.9 in [facility])           Orders Placed This Encounter   Procedures    Basic Metabolic Panel     Standing Status:   Future     Standing Expiration Date:   12/13/2022       Orders Placed This Encounter   Medications    escitalopram (LEXAPRO) 10 MG tablet     Sig: Take 1 tablet by mouth daily     Dispense:  30 tablet     Refill:  3          Medication List          Accurate as of December 13, 2021 11:59 PM. If you have any questions, ask your nurse or doctor. CONTINUE taking these medications    escitalopram 10 MG tablet  Commonly known as: LEXAPRO  Take 1 tablet by mouth daily     losartan-hydroCHLOROthiazide 50-12.5 MG per tablet  Commonly known as: HYZAAR  TAKE 1 TABLET BY MOUTH ONCE DAILY     pantoprazole 40 MG tablet  Commonly known as: Protonix  Take 1 tablet by mouth daily as needed (acid reflux)     therapeutic multivitamin-minerals tablet     triamcinolone 0.1 % cream  Commonly known as: KENALOG  Apply topically 2 times daily.            Where to Get Your Medications      These medications were sent to [address] [place] [address], [address]  [facility], [facility]    Phone: [phone]   · escitalopram 10 MG tablet           Plan:   Return in about 3 months (around 3/13/2022) for for routine major medical condition management. Patient Instructions     Patient will take ibuprofen 800 mg every 8 hours for 3 to 5 days. Monitor response of her pain to this. Renal function will be checked due to taking episodic ibuprofen as well as being on a diuretic for hypertension control. Lexapro will be really started for the winter and potential for weaning off will be discussed in the spring. Patient Education        Learning About Low-Carbohydrate Diets  What is a low-carbohydrate diet? A low-carbohydrate (or \"low-carb\") diet limits foods and drinks that have carbohydrates. This includes grains, fruits, milk and yogurt, and starchy vegetables like potatoes, beans, and corn. It also avoids foods and drinks that have added sugar. Instead, low-carb diets include foods that are high in protein and fat. Why might you follow a low-carb diet? Low-carb diets may be used for a variety of reasons, such as for weight loss. People who have diabetes may use a low-carb diet to help manage their blood sugar levels. What should you do before you start the diet? Talk to your doctor before you try any diet. This is even more important if you have health problems like kidney disease, heart disease, or diabetes. Your doctor may suggest that you meet with a registered dietitian. A dietitian can help you make an eating plan that works for you. What foods do you eat on a low-carb diet? On a low-carb diet, you choose foods that are high in protein and fat. Examples of these are:  · Meat, poultry, and fish. · Eggs. · Nuts, such as walnuts, pecans, almonds, and peanuts.   · Peanut butter inflammation. Put a thin cloth between the ice and your skin. · To prevent stiffness, gently move the joint through its full range of motion several times a day. · If the joint hurts, avoid activities that put a strain on it for a few days. Take rest breaks throughout the day. · Get regular exercise. Walking, swimming, yoga, biking, albin chi, and water aerobics are good exercises that are gentle on the joints. · Reach and stay at a healthy weight. If you need to lose or maintain weight, regular exercise and a healthy diet will help. Extra weight can strain the joints, especially the knees and hips, and make the pain worse. Losing even a few pounds may help. · Take pain medicines exactly as directed. ? If the doctor gave you a prescription medicine for pain, take it as prescribed. ? If you are not taking a prescription pain medicine, ask your doctor if you can take an over-the-counter medicine. When should you call for help? Call your doctor now or seek immediate medical care if:    · The pain is so bad that you cannot use the joint.     · You have sudden back pain with weakness in your legs or loss of bowel or bladder control.     · Your stools are black and tarlike or have streaks of blood.     · You have severe pain and swelling in more than one joint. Watch closely for changes in your health, and be sure to contact your doctor if:    · You have side effects from the medicines, like belly pain, ongoing heartburn, or nausea.     · Joint pain continues for more than 6 weeks, and home treatment is not helping. Where can you learn more? Go to https://Virtustream.ZenSuite. org and sign in to your BiancaMed account. Enter C514 in the GiveMeSport box to learn more about \"Osteoarthritis: Care Instructions. \"     If you do not have an account, please click on the \"Sign Up Now\" link. Current as of: August 5, 2020               Content Version: 12.9  © 6875-3075 Healthwise, Incorporated.

## 2021-12-14 ASSESSMENT — ENCOUNTER SYMPTOMS
ABDOMINAL DISTENTION: 0
PHOTOPHOBIA: 0
CHEST TIGHTNESS: 0
SHORTNESS OF BREATH: 0
ABDOMINAL PAIN: 0

## 2022-01-19 RX ORDER — LOSARTAN POTASSIUM AND HYDROCHLOROTHIAZIDE 12.5; 5 MG/1; MG/1
TABLET ORAL
Qty: 90 TABLET | Refills: 3 | Status: SHIPPED | OUTPATIENT
Start: 2022-01-19 | End: 2022-02-18 | Stop reason: SDUPTHER

## 2022-01-19 NOTE — TELEPHONE ENCOUNTER
Requesting medication refill. Please approve or deny this request.    Rx requested:  Requested Prescriptions     Pending Prescriptions Disp Refills    losartan-hydroCHLOROthiazide (HYZAAR) 50-12.5 MG per tablet [Pharmacy Med Name: losartan 50 mg-hydrochlorothiazide 12.5 mg tablet] 90 tablet 3     Sig: TAKE 1 TABLET ONCE DAILY         Last Office Visit:   10/13/2021      Next Visit Date:  Future Appointments   Date Time Provider Sudhir Anders   2/9/2022  8:00 AM Karl Shields MD [facility]   3/14/2022  5:00 PM Gaye Chow MD [facility]   6/28/2022  3:30 PM Radha Randolph MD [facility]               Last refill 6/4/21. Please approve or deny.

## 2022-02-04 ENCOUNTER — TELEPHONE (OUTPATIENT)
Dept: FAMILY MEDICINE CLINIC | Age: 37
End: 2022-02-04

## 2022-02-17 DIAGNOSIS — M19.071 PRIMARY OSTEOARTHRITIS OF RIGHT FOOT: ICD-10-CM

## 2022-02-17 LAB
ANION GAP SERPL CALCULATED.3IONS-SCNC: 8 MEQ/L (ref 9–15)
BUN BLDV-MCNC: 19 MG/DL (ref 6–20)
CALCIUM SERPL-MCNC: 8.7 MG/DL (ref 8.5–9.9)
CHLORIDE BLD-SCNC: 100 MEQ/L (ref 95–107)
CO2: 24 MEQ/L (ref 20–31)
CREAT SERPL-MCNC: 0.81 MG/DL (ref 0.7–1.2)
GFR AFRICAN AMERICAN: >60
GFR NON-AFRICAN AMERICAN: >60
GLUCOSE BLD-MCNC: 117 MG/DL (ref 70–99)
POTASSIUM SERPL-SCNC: 4.2 MEQ/L (ref 3.4–4.9)
SODIUM BLD-SCNC: 132 MEQ/L (ref 135–144)

## 2022-02-18 RX ORDER — LOSARTAN POTASSIUM AND HYDROCHLOROTHIAZIDE 12.5; 5 MG/1; MG/1
TABLET ORAL
Qty: 90 TABLET | Refills: 3 | Status: SHIPPED | OUTPATIENT
Start: 2022-02-18 | End: 2022-08-08 | Stop reason: DRUGHIGH

## 2022-03-18 ENCOUNTER — TELEPHONE (OUTPATIENT)
Dept: FAMILY MEDICINE CLINIC | Age: 37
End: 2022-03-18

## 2022-03-18 DIAGNOSIS — M19.071 PRIMARY OSTEOARTHRITIS OF RIGHT FOOT: ICD-10-CM

## 2022-03-18 DIAGNOSIS — R20.2 DISTAL PARESTHESIA: Primary | ICD-10-CM

## 2022-03-18 NOTE — TELEPHONE ENCOUNTER
I called pt to confirm 3/21 appt,    Pt is wondering if you can put in a lab order for uric acid for gout. Pt was seen by podiatry and was told to see Dr Luis Calloway to see what medications pt should go on.      Pt [phone]

## 2022-03-21 ENCOUNTER — OFFICE VISIT (OUTPATIENT)
Dept: FAMILY MEDICINE CLINIC | Age: 37
End: 2022-03-21
Payer: COMMERCIAL

## 2022-03-21 VITALS
HEART RATE: 84 BPM | HEIGHT: 68 IN | TEMPERATURE: 97.6 F | OXYGEN SATURATION: 98 % | WEIGHT: 259.6 LBS | SYSTOLIC BLOOD PRESSURE: 120 MMHG | DIASTOLIC BLOOD PRESSURE: 82 MMHG | BODY MASS INDEX: 39.34 KG/M2

## 2022-03-21 DIAGNOSIS — M19.071 PRIMARY OSTEOARTHRITIS OF RIGHT FOOT: ICD-10-CM

## 2022-03-21 DIAGNOSIS — I10 ESSENTIAL HYPERTENSION: ICD-10-CM

## 2022-03-21 DIAGNOSIS — E66.01 CLASS 3 SEVERE OBESITY DUE TO EXCESS CALORIES WITHOUT SERIOUS COMORBIDITY WITH BODY MASS INDEX (BMI) OF 40.0 TO 44.9 IN ADULT (HCC): ICD-10-CM

## 2022-03-21 DIAGNOSIS — R20.2 DISTAL PARESTHESIA: ICD-10-CM

## 2022-03-21 DIAGNOSIS — R73.9 HYPERGLYCEMIA: Primary | ICD-10-CM

## 2022-03-21 DIAGNOSIS — F41.9 ANXIETY: ICD-10-CM

## 2022-03-21 PROBLEM — M10.9 GOUTY ARTHRITIS OF LEFT GREAT TOE: Status: ACTIVE | Noted: 2022-03-02

## 2022-03-21 LAB
ANION GAP SERPL CALCULATED.3IONS-SCNC: 8 MEQ/L (ref 9–15)
BUN BLDV-MCNC: 16 MG/DL (ref 6–20)
CALCIUM SERPL-MCNC: 9.3 MG/DL (ref 8.5–9.9)
CHLORIDE BLD-SCNC: 101 MEQ/L (ref 95–107)
CO2: 29 MEQ/L (ref 20–31)
CREAT SERPL-MCNC: 1.01 MG/DL (ref 0.7–1.2)
GFR AFRICAN AMERICAN: >60
GFR NON-AFRICAN AMERICAN: >60
GLUCOSE BLD-MCNC: 117 MG/DL (ref 70–99)
HBA1C MFR BLD: 5.1 %
MAGNESIUM: 1.9 MG/DL (ref 1.7–2.4)
POTASSIUM SERPL-SCNC: 3.8 MEQ/L (ref 3.4–4.9)
SODIUM BLD-SCNC: 138 MEQ/L (ref 135–144)
URIC ACID, SERUM: 8.7 MG/DL (ref 3.4–7)

## 2022-03-21 PROCEDURE — G8484 FLU IMMUNIZE NO ADMIN: HCPCS | Performed by: FAMILY MEDICINE

## 2022-03-21 PROCEDURE — G8427 DOCREV CUR MEDS BY ELIG CLIN: HCPCS | Performed by: FAMILY MEDICINE

## 2022-03-21 PROCEDURE — 83036 HEMOGLOBIN GLYCOSYLATED A1C: CPT | Performed by: FAMILY MEDICINE

## 2022-03-21 PROCEDURE — 99214 OFFICE O/P EST MOD 30 MIN: CPT | Performed by: FAMILY MEDICINE

## 2022-03-21 PROCEDURE — G8417 CALC BMI ABV UP PARAM F/U: HCPCS | Performed by: FAMILY MEDICINE

## 2022-03-21 PROCEDURE — 1036F TOBACCO NON-USER: CPT | Performed by: FAMILY MEDICINE

## 2022-03-21 RX ORDER — ESCITALOPRAM OXALATE 5 MG/1
5 TABLET ORAL DAILY
Qty: 30 TABLET | Refills: 0 | Status: SHIPPED | OUTPATIENT
Start: 2022-03-21 | End: 2022-08-08 | Stop reason: ALTCHOICE

## 2022-03-21 SDOH — ECONOMIC STABILITY: FOOD INSECURITY: WITHIN THE PAST 12 MONTHS, THE FOOD YOU BOUGHT JUST DIDN'T LAST AND YOU DIDN'T HAVE MONEY TO GET MORE.: NEVER TRUE

## 2022-03-21 SDOH — ECONOMIC STABILITY: FOOD INSECURITY: WITHIN THE PAST 12 MONTHS, YOU WORRIED THAT YOUR FOOD WOULD RUN OUT BEFORE YOU GOT MONEY TO BUY MORE.: NEVER TRUE

## 2022-03-21 ASSESSMENT — PATIENT HEALTH QUESTIONNAIRE - PHQ9
SUM OF ALL RESPONSES TO PHQ9 QUESTIONS 1 & 2: 0
2. FEELING DOWN, DEPRESSED OR HOPELESS: 0
SUM OF ALL RESPONSES TO PHQ QUESTIONS 1-9: 0
1. LITTLE INTEREST OR PLEASURE IN DOING THINGS: 0

## 2022-03-21 ASSESSMENT — ENCOUNTER SYMPTOMS
SHORTNESS OF BREATH: 0
ABDOMINAL PAIN: 0
PHOTOPHOBIA: 0
CHEST TIGHTNESS: 0
ABDOMINAL DISTENTION: 0

## 2022-03-21 ASSESSMENT — SOCIAL DETERMINANTS OF HEALTH (SDOH): HOW HARD IS IT FOR YOU TO PAY FOR THE VERY BASICS LIKE FOOD, HOUSING, MEDICAL CARE, AND HEATING?: NOT HARD AT ALL

## 2022-03-21 NOTE — PATIENT INSTRUCTIONS
lexapro 5 mg daily for 2 weeks then 5 mg every other day until gone and stop. Continue current bp medication. hba1c 5.1 is good    Await uric acid level for other treatment options    Patient is already lost over 10 pounds. Continue her workout regimen. Keep it at a reasonable rate to avoid injury.

## 2022-03-21 NOTE — PROGRESS NOTES
1. Primary osteoarthritis of right foot  Basic Metabolic Panel   2. Anxiety  escitalopram (LEXAPRO) 10 MG tablet   3. Essential hypertension      4. Class 3 severe obesity due to excess calories without serious comorbidity with body mass index (BMI) of 40.0 to 44.9 in adult [facility])         Return in about 3 months (around 3/13/2022) for for routine major medical condition management. Patient Instructions      Patient will take ibuprofen 800 mg every 8 hours for 3 to 5 days. Monitor response of her pain to this. Renal function will be checked due to taking episodic ibuprofen as well as being on a diuretic for hypertension control. Lexapro will be really started for the winter and potential for weaning off will be discussed in the spring.   Patient Education

## 2022-03-21 NOTE — PROGRESS NOTES
Diagnosis Orders   1. Hyperglycemia  POCT glycosylated hemoglobin (Hb A1C)   2. Anxiety  escitalopram (LEXAPRO) 5 MG tablet   3. Essential hypertension     4. Class 3 severe obesity due to excess calories without serious comorbidity with body mass index (BMI) of 40.0 to 44.9 in adult [facility])       Return for 2 weeks after completing lexapro wean . Patient Instructions   lexapro 5 mg daily for 2 weeks then 5 mg every other day until gone and stop. Continue current bp medication. hba1c 5.1 is good    Await uric acid level for other treatment options    Patient is already lost over 10 pounds. Continue her workout regimen. Keep it at a reasonable rate to avoid injury. Subjective:      Patient ID: Malaika Serrano is a 31 y.o. male who presents for:  Chief Complaint   Patient presents with    Discuss Medications       Patient states he was podiatrist.  She gave him a course of steroids and that improved the symptoms in the left first toe. He notes he had swelling a little bit of redness did not know whether the joint was hot it was so painful he did not want to touch it. The steroids helped. Pain started creeping back and after the steroids were complete. Patient denies any cardiovascular symptoms. See review of systems. Happy with current blood pressure control. Attempting to lose weight and get back into fitness. Lab work was done today. Results were not back yet. Hemoglobin A1c was 5.1 investigating the hyperglycemia. Patient is also interested in looking at weaning off his Lexapro now. Getting into a fitness routine feeling better.       Current Outpatient Medications on File Prior to Visit   Medication Sig Dispense Refill    losartan-hydroCHLOROthiazide (HYZAAR) 50-12.5 MG per tablet TAKE 1 TABLET ONCE DAILY 90 tablet 3    pantoprazole (PROTONIX) 40 MG tablet Take 1 tablet by mouth daily as needed (acid reflux) 30 tablet 5    Multiple Vitamins-Minerals (THERAPEUTIC MULTIVITAMIN-MINERALS) tablet Take 1 tablet by mouth daily      triamcinolone (KENALOG) 0.1 % cream Apply topically 2 times daily. 454 g 0     No current facility-administered medications on file prior to visit. Past Medical History:   Diagnosis Date    Scoliosis      History reviewed. No pertinent surgical history. Social History     Socioeconomic History    Marital status: Single     Spouse name: Not on file    Number of children: Not on file    Years of education: Not on file    Highest education level: Not on file   Occupational History    Not on file   Tobacco Use    Smoking status: Never Smoker    Smokeless tobacco: Never Used   Substance and Sexual Activity    Alcohol use: Yes     Comment: occasional    Drug use: Yes     Types: Marijuana Thurl Cipro)    Sexual activity: Yes   Other Topics Concern    Not on file   Social History Narrative    Not on file     Social Determinants of Health     Financial Resource Strain: Low Risk     Difficulty of Paying Living Expenses: Not hard at all   Food Insecurity: No Food Insecurity    Worried About [address] in the Last Year: Never true    [address] in the Last Year: Never true   Transportation Needs:     Lack of Transportation (Medical): Not on file    Lack of Transportation (Non-Medical):  Not on file   Physical Activity:     Days of Exercise per Week: Not on file    Minutes of Exercise per Session: Not on file   Stress:     Feeling of Stress : Not on file   Social Connections:     Frequency of Communication with Friends and Family: Not on file    Frequency of Social Gatherings with Friends and Family: Not on file    Attends Cheondoism Services: Not on file    Active Member of Clubs or Organizations: Not on file    Attends Club or Organization Meetings: Not on file    Marital Status: Not on file   Intimate Partner Violence:     Fear of Current or Ex-Partner: Not on file    Emotionally Abused: Not on file    Physically Abused: Not on file    Sexually Abused: Not on file   Housing Stability:     Unable to Pay for Housing in the Last Year: Not on file    Number of Places Lived in the Last Year: Not on file    Unstable Housing in the Last Year: Not on file     Family History   Problem Relation Age of Onset    Heart Disease Maternal Grandfather     High Blood Pressure Maternal Grandfather      Allergies:  Patient has no known allergies. Review of Systems   Constitutional: Negative for activity change, appetite change, diaphoresis and unexpected weight change. Eyes: Negative for photophobia and visual disturbance. Respiratory: Negative for chest tightness and shortness of breath. No orthopnea   Cardiovascular: Negative for chest pain, palpitations and leg swelling. Gastrointestinal: Negative for abdominal distention and abdominal pain. Genitourinary: Negative for flank pain and frequency. Musculoskeletal: Negative for gait problem and joint swelling. Neurological: Negative for dizziness, weakness, light-headedness and headaches. Psychiatric/Behavioral: Negative for confusion. Objective:   /82   Pulse 84   Temp 97.6 °F (36.4 °C)   Ht 5' 8\" (1.727 m)   Wt 259 lb 9.6 oz (117.8 kg)   SpO2 98%   BMI 39.47 kg/m²     Physical Exam  Vitals reviewed. Constitutional:       General: He is not in acute distress. Appearance: He is well-developed. HENT:      Head: Normocephalic and atraumatic. Right Ear: External ear normal.      Left Ear: External ear normal.      Nose: Nose normal.   Eyes:      General:         Right eye: No discharge. Left eye: No discharge. Conjunctiva/sclera: Conjunctivae normal.      Pupils: Pupils are equal, round, and reactive to light. Neck:      Thyroid: No thyromegaly. Cardiovascular:      Rate and Rhythm: Normal rate and regular rhythm. Heart sounds: Normal heart sounds.    Pulmonary:      Effort: Pulmonary effort is normal. No respiratory distress. Breath sounds: Normal breath sounds. Abdominal:      General: There is no distension. Musculoskeletal:         General: No swelling, tenderness, deformity or signs of injury. Cervical back: Neck supple. Skin:     General: Skin is warm and dry. Neurological:      Mental Status: He is alert and oriented to person, place, and time. Coordination: Coordination normal.   Psychiatric:         Thought Content: Thought content normal.         Judgment: Judgment normal.         Results for POC orders placed in visit on 03/21/22   POCT glycosylated hemoglobin (Hb A1C)   Result Value Ref Range    Hemoglobin A1C 5.1 %       Recent Results (from the past 2016 hour(s))   Basic Metabolic Panel    Collection Time: 02/17/22 10:11 AM   Result Value Ref Range    Sodium 132 (L) 135 - 144 mEq/L    Potassium 4.2 3.4 - 4.9 mEq/L    Chloride 100 95 - 107 mEq/L    CO2 24 20 - 31 mEq/L    Anion Gap 8 (L) 9 - 15 mEq/L    Glucose 117 (H) 70 - 99 mg/dL    BUN 19 6 - 20 mg/dL    CREATININE 0.81 0.70 - 1.20 mg/dL    GFR Non-African American >60.0 >60    GFR  >60.0 >60    Calcium 8.7 8.5 - 9.9 mg/dL   POCT glycosylated hemoglobin (Hb A1C)    Collection Time: 03/21/22  4:30 PM   Result Value Ref Range    Hemoglobin A1C 5.1 %       [] Pt was seen by provider for      Minutes  Counseling and coordination of care was done for all assessment diagnosis listed for today with patient and any family/friend present. More than 50% of this visit was spent coordinating cuurent care, obtaining information for prior records, and counseling for current plan of action. Assessment:       Diagnosis Orders   1. Hyperglycemia  POCT glycosylated hemoglobin (Hb A1C)   2. Anxiety  escitalopram (LEXAPRO) 5 MG tablet   3. Essential hypertension     4.  Class 3 severe obesity due to excess calories without serious comorbidity with body mass index (BMI) of 40.0 to 44.9 in adult [facility])           Orders Placed This Encounter   Procedures    POCT glycosylated hemoglobin (Hb A1C)       Orders Placed This Encounter   Medications    escitalopram (LEXAPRO) 5 MG tablet     Sig: Take 1 tablet by mouth daily     Dispense:  30 tablet     Refill:  0          Medication List          Accurate as of March 21, 2022  5:01 PM. If you have any questions, ask your nurse or doctor. START taking these medications    escitalopram 5 MG tablet  Commonly known as: LEXAPRO  Take 1 tablet by mouth daily  Started by: Keren Willard MD        CONTINUE taking these medications    losartan-hydroCHLOROthiazide 50-12.5 MG per tablet  Commonly known as: HYZAAR  TAKE 1 TABLET ONCE DAILY     pantoprazole 40 MG tablet  Commonly known as: Protonix  Take 1 tablet by mouth daily as needed (acid reflux)     therapeutic multivitamin-minerals tablet     triamcinolone 0.1 % cream  Commonly known as: KENALOG  Apply topically 2 times daily. Where to Get Your Medications      These medications were sent to [address] [place] [address] 3, [address] [place] [address]    Phone: [phone]   · escitalopram 5 MG tablet           Plan:   Return for 2 weeks after completing lexapro wean . Patient Instructions   lexapro 5 mg daily for 2 weeks then 5 mg every other day until gone and stop. Continue current bp medication. hba1c 5.1 is good    Await uric acid level for other treatment options    Patient is already lost over 10 pounds. Continue her workout regimen. Keep it at a reasonable rate to avoid injury. This note was partially created with the assistance of dictation. This may lead to grammatical or spelling errors. Dionisio Diallo M.D.

## 2022-03-28 RX ORDER — ALLOPURINOL 100 MG/1
100 TABLET ORAL 2 TIMES DAILY
Qty: 60 TABLET | Refills: 5 | Status: SHIPPED | OUTPATIENT
Start: 2022-03-28 | End: 2022-09-24

## 2022-05-02 ENCOUNTER — OFFICE VISIT (OUTPATIENT)
Dept: FAMILY MEDICINE CLINIC | Age: 37
End: 2022-05-02
Payer: COMMERCIAL

## 2022-05-02 VITALS — HEIGHT: 68 IN | RESPIRATION RATE: 14 BRPM | BODY MASS INDEX: 38.65 KG/M2 | WEIGHT: 255 LBS

## 2022-05-02 DIAGNOSIS — F41.9 ANXIETY: ICD-10-CM

## 2022-05-02 DIAGNOSIS — R53.82 CHRONIC FATIGUE: ICD-10-CM

## 2022-05-02 DIAGNOSIS — M10.9 GOUTY ARTHRITIS OF LEFT GREAT TOE: Primary | ICD-10-CM

## 2022-05-02 DIAGNOSIS — M25.552 HIP PAIN, LEFT: ICD-10-CM

## 2022-05-02 DIAGNOSIS — M21.41 ACQUIRED FLAT FOOT, RIGHT: ICD-10-CM

## 2022-05-02 DIAGNOSIS — M54.2 CERVICALGIA: ICD-10-CM

## 2022-05-02 DIAGNOSIS — M19.071 PRIMARY OSTEOARTHRITIS OF RIGHT FOOT: ICD-10-CM

## 2022-05-02 LAB — URIC ACID, SERUM: 5.7 MG/DL (ref 3.4–7)

## 2022-05-02 PROCEDURE — G8427 DOCREV CUR MEDS BY ELIG CLIN: HCPCS | Performed by: FAMILY MEDICINE

## 2022-05-02 PROCEDURE — 1036F TOBACCO NON-USER: CPT | Performed by: FAMILY MEDICINE

## 2022-05-02 PROCEDURE — 99214 OFFICE O/P EST MOD 30 MIN: CPT | Performed by: FAMILY MEDICINE

## 2022-05-02 PROCEDURE — G8417 CALC BMI ABV UP PARAM F/U: HCPCS | Performed by: FAMILY MEDICINE

## 2022-05-02 ASSESSMENT — ENCOUNTER SYMPTOMS: BACK PAIN: 1

## 2022-05-02 NOTE — PATIENT INSTRUCTIONS
Pt to continue allopurinol. improving    PT to eval for cervalgia and hip pain which is worsened    May stay off lexapro if feeling well.   controlled

## 2022-05-02 NOTE — PROGRESS NOTES
Diagnosis Orders   1. Gouty arthritis of left great toe     2. Acquired flat foot, right     3. Anxiety     4. Cervicalgia  [facility] [place]   5. Hip pain, left  [facility] [place]   6. Chronic fatigue       Return in about 3 months (around 8/2/2022) for for review of outcome of today's recommendation. Patient Instructions   Pt to continue allopurinol. improving    PT to eval for cervalgia and hip pain which is worsened    May stay off lexapro if feeling well. controlled      Subjective:      Patient ID: Cal Waters is a 31 y.o. male who presents for:  Chief Complaint   Patient presents with    Follow-up     Gout and anxiety       Side effects such as sexual dysfunction resolved and weight loss ineffective all better. Doing better with diet and exercise      Current Outpatient Medications on File Prior to Visit   Medication Sig Dispense Refill    allopurinol (ZYLOPRIM) 100 MG tablet Take 1 tablet by mouth 2 times daily 60 tablet 5    escitalopram (LEXAPRO) 5 MG tablet Take 1 tablet by mouth daily 30 tablet 0    losartan-hydroCHLOROthiazide (HYZAAR) 50-12.5 MG per tablet TAKE 1 TABLET ONCE DAILY 90 tablet 3    pantoprazole (PROTONIX) 40 MG tablet Take 1 tablet by mouth daily as needed (acid reflux) 30 tablet 5    Multiple Vitamins-Minerals (THERAPEUTIC MULTIVITAMIN-MINERALS) tablet Take 1 tablet by mouth daily      triamcinolone (KENALOG) 0.1 % cream Apply topically 2 times daily. 454 g 0     No current facility-administered medications on file prior to visit. Past Medical History:   Diagnosis Date    Scoliosis      History reviewed. No pertinent surgical history.   Social History     Socioeconomic History    Marital status: Single     Spouse name: Not on file    Number of children: Not on file    Years of education: Not on file    Highest education level: Not on file   Occupational History    Not on file   Tobacco Use    Smoking status: Never Smoker    Smokeless tobacco: Never Used   Substance and Sexual Activity    Alcohol use: Yes     Comment: occasional    Drug use: Yes     Types: Marijuana Scottie Relic)    Sexual activity: Yes   Other Topics Concern    Not on file   Social History Narrative    Not on file     Social Determinants of Health     Financial Resource Strain: Low Risk     Difficulty of Paying Living Expenses: Not hard at all   Food Insecurity: No Food Insecurity    Worried About Running Out of Food in the Last Year: Never true    [address] in the Last Year: Never true   Transportation Needs:     Lack of Transportation (Medical): Not on file    Lack of Transportation (Non-Medical): Not on file   Physical Activity:     Days of Exercise per Week: Not on file    Minutes of Exercise per Session: Not on file   Stress:     Feeling of Stress : Not on file   Social Connections:     Frequency of Communication with Friends and Family: Not on file    Frequency of Social Gatherings with Friends and Family: Not on file    Attends Caodaism Services: Not on file    Active Member of [address] [place] [address] or Organizations: Not on file    Attends Club or Organization Meetings: Not on file    Marital Status: Not on file   Intimate Partner Violence:     Fear of Current or Ex-Partner: Not on file    Emotionally Abused: Not on file    Physically Abused: Not on file    Sexually Abused: Not on file   Housing Stability:     Unable to Pay for Housing in the Last Year: Not on file    Number of [place] in the Last Year: Not on file    Unstable Housing in the Last Year: Not on file     Family History   Problem Relation Age of Onset    Heart Disease Maternal Grandfather     High Blood Pressure Maternal Grandfather      Allergies:  Patient has no known allergies. Review of Systems   Constitutional: Positive for fatigue. Negative for chills, diaphoresis and fever. Musculoskeletal: Positive for arthralgias, back pain and gait problem. Psychiatric/Behavioral: Negative for dysphoric mood and sleep disturbance. The patient is not nervous/anxious. Objective:   Resp 14   Ht 5' 8\" (1.727 m)   Wt 255 lb (115.7 kg)   BMI 38.77 kg/m²     Physical Exam  Vitals reviewed. Constitutional:       General: He is not in acute distress. Appearance: He is well-developed. HENT:      Head: Normocephalic and atraumatic. Right Ear: External ear normal.      Left Ear: External ear normal.      Nose: Nose normal.   Eyes:      General:         Right eye: No discharge. Left eye: No discharge. Conjunctiva/sclera: Conjunctivae normal.      Pupils: Pupils are equal, round, and reactive to light. Neck:      Thyroid: No thyromegaly. Cardiovascular:      Rate and Rhythm: Normal rate and regular rhythm. Pulmonary:      Effort: Pulmonary effort is normal. No respiratory distress. Abdominal:      General: There is no distension. Musculoskeletal:      Cervical back: Neck supple. Comments: Left arch is significantly lower than the right and obvious angular deformity noted at the first MCP of the left foot. It is not red or warm   Skin:     General: Skin is warm and dry. Neurological:      Mental Status: He is alert and oriented to person, place, and time. Coordination: Coordination normal.   Psychiatric:         Thought Content: Thought content normal.         Judgment: Judgment normal.         No results found for this visit on 05/02/22.     Recent Results (from the past 2016 hour(s))   Basic Metabolic Panel    Collection Time: 02/17/22 10:11 AM   Result Value Ref Range    Sodium 132 (L) 135 - 144 mEq/L    Potassium 4.2 3.4 - 4.9 mEq/L    Chloride 100 95 - 107 mEq/L    CO2 24 20 - 31 mEq/L    Anion Gap 8 (L) 9 - 15 mEq/L    Glucose 117 (H) 70 - 99 mg/dL    BUN 19 6 - 20 mg/dL    CREATININE 0.81 0.70 - 1.20 mg/dL    GFR Non-African American >60.0 >60    GFR  >60.0 >60    Calcium 8.7 8.5 - 9.9 mg/dL Magnesium    Collection Time: 03/21/22  2:44 PM   Result Value Ref Range    Magnesium 1.9 1.7 - 2.4 mg/dL   Uric Acid    Collection Time: 03/21/22  2:44 PM   Result Value Ref Range    Uric Acid, Serum 8.7 (H) 3.4 - 7.0 mg/dL   Basic Metabolic Panel    Collection Time: 03/21/22  2:44 PM   Result Value Ref Range    Sodium 138 135 - 144 mEq/L    Potassium 3.8 3.4 - 4.9 mEq/L    Chloride 101 95 - 107 mEq/L    CO2 29 20 - 31 mEq/L    Anion Gap 8 (L) 9 - 15 mEq/L    Glucose 117 (H) 70 - 99 mg/dL    BUN 16 6 - 20 mg/dL    CREATININE 1.01 0.70 - 1.20 mg/dL    GFR Non-African American >60.0 >60    GFR  >60.0 >60    Calcium 9.3 8.5 - 9.9 mg/dL   POCT glycosylated hemoglobin (Hb A1C)    Collection Time: 03/21/22  4:30 PM   Result Value Ref Range    Hemoglobin A1C 5.1 %       [] Pt was seen by provider for      Minutes  Counseling and coordination of care was done for all assessment diagnosis listed for today with patient and any family/friend present. More than 50% of this visit was spent coordinating current care, obtaining information for prior records, and counseling for current plan of action. Assessment:       Diagnosis Orders   1. Gouty arthritis of left great toe     2. Acquired flat foot, right     3. Anxiety     4. Cervicalgia  [facility] [place]   5. Hip pain, left  [facility] [place]   6. Chronic fatigue           Orders Placed This Encounter   Procedures    [facility] [place]     Referral Priority:   Routine     Referral Type:   Eval and Treat     Referral Reason:   Specialty Services Required     Requested Specialty:   Physical Therapy     Number of Visits Requested:   1       No orders of the defined types were placed in this encounter. Medication List          Accurate as of May 2, 2022  6:50 PM. If you have any questions, ask your nurse or doctor.             CONTINUE taking these medications    allopurinol 100 MG tablet  Commonly known as: ZYLOPRIM  Take 1 tablet by mouth 2 times daily     escitalopram 5 MG tablet  Commonly known as: LEXAPRO  Take 1 tablet by mouth daily     losartan-hydroCHLOROthiazide 50-12.5 MG per tablet  Commonly known as: HYZAAR  TAKE 1 TABLET ONCE DAILY     pantoprazole 40 MG tablet  Commonly known as: Protonix  Take 1 tablet by mouth daily as needed (acid reflux)     therapeutic multivitamin-minerals tablet     triamcinolone 0.1 % cream  Commonly known as: KENALOG  Apply topically 2 times daily. Plan:   Return in about 3 months (around 8/2/2022) for for review of outcome of today's recommendation. Patient Instructions   Pt to continue allopurinol. improving    PT to eval for cervalgia and hip pain which is worsened    May stay off lexapro if feeling well. controlled      This note was partially created with the assistance of dictation. This may lead to grammatical or spelling errors. Dionisio Rubin M.D.

## 2022-05-03 LAB
SEX HORMONE BINDING GLOBULIN: 30 NMOL/L (ref 11–80)
TESTOSTERONE FREE-NONMALE: 57.4 PG/ML (ref 47–244)
TESTOSTERONE TOTAL: 278 NG/DL (ref 220–1000)

## 2022-05-17 ENCOUNTER — HOSPITAL ENCOUNTER (OUTPATIENT)
Dept: PHYSICAL THERAPY | Age: 37
Setting detail: THERAPIES SERIES
Discharge: HOME | End: 2022-05-17
Payer: COMMERCIAL

## 2022-05-17 PROCEDURE — 97161 PT EVAL LOW COMPLEX 20 MIN: CPT

## 2022-05-17 ASSESSMENT — PAIN DESCRIPTION - FREQUENCY: FREQUENCY: INTERMITTENT

## 2022-05-17 ASSESSMENT — PAIN DESCRIPTION - ORIENTATION: ORIENTATION: LEFT

## 2022-05-17 ASSESSMENT — PAIN DESCRIPTION - LOCATION: LOCATION: HIP;KNEE;FOOT

## 2022-05-17 ASSESSMENT — PAIN SCALES - GENERAL: PAINLEVEL_OUTOF10: 2

## 2022-05-17 ASSESSMENT — PAIN - FUNCTIONAL ASSESSMENT: PAIN_FUNCTIONAL_ASSESSMENT: PREVENTS OR INTERFERES SOME ACTIVE ACTIVITIES AND ADLS

## 2022-05-17 ASSESSMENT — PAIN DESCRIPTION - ONSET: ONSET: ON-GOING

## 2022-05-17 ASSESSMENT — PAIN DESCRIPTION - DESCRIPTORS: DESCRIPTORS: ACHING

## 2022-05-17 ASSESSMENT — PAIN DESCRIPTION - PAIN TYPE: TYPE: ACUTE PAIN;CHRONIC PAIN

## 2022-05-17 NOTE — PROGRESS NOTES
[facility], [address]     Ph: [phone]  Fax: [phone]      [] Certification  [] Recertification [x]  Plan of Care  [] Progress Note [x] Discharge      Referring Provider: Wolf Wells MD   From:  Kiki Cm, PT , DPT  Patient: Tye Jacobo (31 y.o. male) : 1985 Date: 2022   Medical Diagnosis: Cervicalgia [M54.2]  Pain in left hip [M25.552] Cervicalgia, Hip pain, left    Plan of Care/Certification Expiration Date: :     Progress Report Period from:  2022  to 2022    Visits to Date: 1 No Show: 0 Cancelled Appts: 0    Assessment: Patient is a 31year old male who presents with on-going intermittent left LE pain that started after diagnosis of gout in left foot. Patient demonstrates significant left LE ROM & strength as well as normal gait & stair pattern. Patient self-reports he has felt significantly better since being put on gout medication & has noticed decreased pain & improved strength in left LE. Educated patient on performing bilateral LE strengthening & endurance exercises regularly on top of focusing on ankle strengthening through SLS & exercises in order to work towards preventing left LE weakness. Patient verbalized content with current position & confirmed he will follow DPT's advice regarding lifestyle changes with exercises & stretches. Patient encouraged to trial strengthening exercises on his own until follow-up with MD & to return back to outpatient PT only if he feels that he has not improved his function. Patient in agreement & does not feel that he needs to continue with outpatient PT services at this time.     PT Education: PT Role;Plan of Care;Evaluative findings;Home Exercise Program  Patient Education: Regularly performing SLS & bilateral LE (focus on ankle) strengthening & endurance exercises - handouts & theraband provided to patient    PLAN: Frequency/Duration:  Plan Comment: No outpatient physical therapy needs at this time                    Patient Status:[] Continue/ Initiate plan of Care    [x] Discharge PT. Recommend pt continue with HEP. [] Additional visits requested, Please re-certify for additional visits:    [] Hold         Signature: Electronically signed by Ainsley Kelly PT on 5/17/22 at 10:22 AM EDT      If you have any questions or concerns, please don't hesitate to call. Thank you for your referral.    I have reviewed this plan of care and certify a need for medically necessary rehabilitation services.     Physician Signature:__________________________________________________________  Date:  Please sign and return

## 2022-05-17 NOTE — PROGRESS NOTES
[facility]  PHYSICAL THERAPY EVALUATION      Physical Therapy: Initial Evaluation    Patient: Tyrell Lima (72 y.o.     male)   Examination Date:   Plan of Care Certification Period: 2022 to        :  1985 ;    Confirmed: Yes MRN: [id number]  CSN: [id number]   Insurance: Payor: [employer] / Plan: [employer] [address] / Product Type: *No Product type* /   Insurance ID: [id number] - (Commercial) Secondary Insurance (if applicable):    Referring Physician: Lucrecia Myers MD     PCP: Deuce Mcallister MD Visits to Date/Visits Approved:     No Show/Cancelled Appts: 0  0     Medical Diagnosis: Cervicalgia [M54.2]  Pain in left hip [M25.552] Cervicalgia, Hip pain, left  Treatment Diagnosis:       PERTINENT MEDICAL HISTORY   Patient Assessed for Rehabilitation Services: Yes  Self reported health status[de-identified] Fair    Medical History: Chart Reviewed: Yes   Past Medical History:   Diagnosis Date    Scoliosis      Surgical History: History reviewed. No pertinent surgical history. Medications:   Current Outpatient Medications:     allopurinol (ZYLOPRIM) 100 MG tablet, Take 1 tablet by mouth 2 times daily, Disp: 60 tablet, Rfl: 5    escitalopram (LEXAPRO) 5 MG tablet, Take 1 tablet by mouth daily, Disp: 30 tablet, Rfl: 0    losartan-hydroCHLOROthiazide (HYZAAR) 50-12.5 MG per tablet, TAKE 1 TABLET ONCE DAILY, Disp: 90 tablet, Rfl: 3    pantoprazole (PROTONIX) 40 MG tablet, Take 1 tablet by mouth daily as needed (acid reflux), Disp: 30 tablet, Rfl: 5    Multiple Vitamins-Minerals (THERAPEUTIC MULTIVITAMIN-MINERALS) tablet, Take 1 tablet by mouth daily, Disp: , Rfl:     triamcinolone (KENALOG) 0.1 % cream, Apply topically 2 times daily. , Disp: 454 g, Rfl: 0  Allergies: Patient has no known allergies.       SUBJECTIVE EXAMINATION     History obtained from[de-identified] Chart Review,Patient,      Family/Caregiver Present: No    Subjective History: Onset Date:  (~1-2 months ago)  Subjective: Patient reports on-going left LE intermittent pain for 1-2 months on the same side as on-going gout in foot for which he just got medication for recently. Patient reports steroid shot in left foot from podiatrist which helped decrease overall pain. Patient denies falling in the past 6 months. Patient reports using ice for gout pain as well as medication which has helped. Patient denies any prior injuries to left LE. Patient reports being active with hockey. Patient reports intermittently working out/stretching. Patient reports cervical region is not priority for him at this time. Patient reports overall left LE pain has significantly decreased since MD visit as well as medication change.   Additional Pertinent Hx (if applicable): arthritis/gout, sinusitis, anxiety, high blood pressure   Any changes to allergies, medications, or have you had any medical procedures/ER visits since your last visit?: No  Comments: RTD = ~August/September 2022      Learning/Language: Learning  Does the patient/guardian have any barriers to learning?: No barriers  Will there be a co-learner?: No  What is the preferred language of the patient/guardian?: English  Is an  required?: No  How does the patient/guardian prefer to learn new concepts?: Listening,Reading,Demonstration     Pain Screening    Pain Screening  Patient Currently in Pain: Yes  Pain Assessment: 0-10  Pain Level: 2  Best Pain Level: 2  Worst Pain Level: 8  Pain Type: Acute pain,Chronic pain  Pain Location: Hip,Knee,Foot  Pain Orientation: Left  Pain Descriptors: Aching  Pain Frequency: Intermittent  Pain Onset: On-going  Functional Pain Assessment: Prevents or interferes some active activities and ADLs  Aggravating factors: Walking,Standing  Pain Management/Relieving Factors: Ice,Rest    Functional Status    Social History:    Social History  Lives With: Family  Type of Home: House  Home Layout: Two level  Home Access: Stairs to enter without rails  Entrance Stairs - Number of Steps: 1    Occupation/Interests:   Occupation: Full time employment  Type of Occupation:   Job Duties: Awkward positions,Prolonged standing,Moderate lifting    Prior Level of Function:   Independent       Current Level of Function:   Independent      ADL Assistance: Independent  Homemaking Assistance: Independent  Homemaking Responsibilities: Yes  Ambulation Assistance: Independent  Transfer Assistance: Independent  Active : Yes  Mode of Transportation: Car    Dominant Hand: : Right    OBJECTIVE EXAMINATION     Review of Systems:  Vision: Within Functional Limits  Hearing: Within functional limits  Overall Orientation Status: Within Normal Limits  Patient affect[de-identified] Normal  Follows Commands: Within Functional Limits    Observations:   General Observations  General Observations: Yes  Description: bilateral hamstrings = WFL; bilateral quads = WFL; equal weight bearing bilateral LEs; normalized gait & stair patterns (equal step heights & lengths as well as reciprocal stair pattern); no tenderness to palpation of left LE    Left AROM  Right AROM         AROM LLE (degrees)  LLE General AROM: hip flexion = >90*; WNL knee & ankle    AROM RLE (degrees)  RLE General AROM: hip flexion = >90*; WNL knee & ankle     Left Strength  Right Strength         Strength LLE  Comment: hip flexion = 4+/5; hip abduction = 4+/5; hip adduction = 4+/5; hip extension = 4+/5; knee flexion = 5/5; knee extension = 5/5; ankle grossly 4/5    Strength RLE  Comment: hip flexion = 4+/5; hip abduction = 4+/5; hip adduction = 4+/5; hip extension = 4+/5; knee flexion = 5/5; knee extension = 5/5; ankle grossly 4/5     Outcomes Score:  Exam: LEFS = 58/80     ASSESSMENT     Impression: Assessment: Patient is a 31year old male who presents with on-going intermittent left LE pain that started after diagnosis of gout in left foot.   Patient demonstrates significant left LE ROM & strength as well as normal gait & stair pattern. Patient self-reports he has felt significantly better since being put on gout medication & has noticed decreased pain & improved strength in left LE. Educated patient on performing bilateral LE strengthening & endurance exercises regularly on top of focusing on ankle strengthening through SLS & exercises in order to work towards preventing left LE weakness. Patient verbalized content with current position & confirmed he will follow DPT's advice regarding lifestyle changes with exercises & stretches. Patient encouraged to trial strengthening exercises on his own until follow-up with MD & to return back to outpatient PT only if he feels that he has not improved his function. Patient in agreement & does not feel that he needs to continue with outpatient PT services at this time. Statement of Medical Necessity: Physical Therapy is both indicated and medically necessary as outlined in the POC to increase the likelihood of meeting the functionally related goals stated below. Patient's Activity Tolerance: Patient tolerated evaluation without incident      Patient's rehabilitation potential/prognosis is considered to be:       Factors which may impact rehabilitation potential include:       Patient Education: PT Isai Talamantes of Care,Evaluative findings,Home Exercise Program      TREATMENT PLAN       Requires PT Follow-Up: No  No Skilled PT: Independent with functional mobility     Treatment may include any combination of the following:       Frequency / Duration:  Patient to be seen   times per week for   weeks  Plan Comment: Regularly performing SLS & bilateral LE (focus on ankle) strengthening & endurance exercises - handouts & theraband provided to patient  No outpatient physical therapy needs at this time          Eval Complexity:   Decision Making: Low Complexity  History: Personal Factors and/or Comorbidities Impacting POC: Medium  Examination of body system(s) including body structures and functions, activity limitations, and/or participation restrictions: Medium  Exam: LEFS = 58/80  Clinical Presentation: Low    POST-PAIN     Pain Rating (0-10 pain scale):   2/10  Location and pain description same as pre-treatment unless indicated. Action: [] NA  [] Call Physician  [x] Perform HEP  [] Meds as prescribed    Evaluation and patient rights have been reviewed and patient agrees with plan of care. Yes  [x]  No  []   Explain:     Luh Fall Risk Assessment  Risk Factor Scale  Score   History of Falls [] Yes  [x] No 25  0    Secondary Diagnosis [] Yes  [x] No 15  0    Ambulatory Aid [] Furniture  [] Crutches/cane/walker  [x] None/bedrest/wheelchair/nurse 30  15  0    IV/Heparin Lock [] Yes  [x] No 20  0    Gait/Transferring [] Impaired  [] Weak  [x] Normal/bedrest/immobile 20  10  0    Mental Status [] Forgets limitations  [x] Oriented to own ability 15  0       Total:0     Based on the Assessment score: check the appropriate box.   [x]  No intervention needed   Low =   Score of 0-24  []  Use standard prevention interventions Moderate =  Score of 24-44   [] Discuss fall prevention strategies   [] Indicate moderate falls risk on eval  []  Use high risk prevention interventions High = Score of 45 and higher   [] Discuss fall prevention strategies   [] Provide supervision during treatment time      Minutes:  PT Individual Minutes  Time In: 0920  Time Out: 1005  Minutes: 45  Timed Code Treatment Minutes: 0 Minutes  Procedure Minutes: 45 minutes evaluation    Electronically signed by Berna Luevano PT on 5/17/22 at 10:20 AM EDT

## 2022-08-08 ENCOUNTER — OFFICE VISIT (OUTPATIENT)
Dept: FAMILY MEDICINE CLINIC | Age: 37
End: 2022-08-08
Payer: COMMERCIAL

## 2022-08-08 VITALS
HEIGHT: 68 IN | SYSTOLIC BLOOD PRESSURE: 138 MMHG | OXYGEN SATURATION: 98 % | HEART RATE: 97 BPM | TEMPERATURE: 98.7 F | BODY MASS INDEX: 40.32 KG/M2 | WEIGHT: 266 LBS | DIASTOLIC BLOOD PRESSURE: 82 MMHG

## 2022-08-08 DIAGNOSIS — M10.9 GOUTY ARTHRITIS OF LEFT GREAT TOE: ICD-10-CM

## 2022-08-08 DIAGNOSIS — I10 ESSENTIAL HYPERTENSION: ICD-10-CM

## 2022-08-08 DIAGNOSIS — M54.10 RADICULAR LOW BACK PAIN: ICD-10-CM

## 2022-08-08 DIAGNOSIS — E66.01 CLASS 3 SEVERE OBESITY DUE TO EXCESS CALORIES WITHOUT SERIOUS COMORBIDITY WITH BODY MASS INDEX (BMI) OF 40.0 TO 44.9 IN ADULT (HCC): ICD-10-CM

## 2022-08-08 DIAGNOSIS — M54.10 RADICULAR LOW BACK PAIN: Primary | ICD-10-CM

## 2022-08-08 PROBLEM — E66.813 CLASS 3 SEVERE OBESITY DUE TO EXCESS CALORIES WITHOUT SERIOUS COMORBIDITY WITH BODY MASS INDEX (BMI) OF 40.0 TO 44.9 IN ADULT: Status: ACTIVE | Noted: 2022-08-08

## 2022-08-08 LAB
BACTERIA: NEGATIVE /HPF
BILIRUBIN URINE: NEGATIVE
BLOOD, URINE: NEGATIVE
CLARITY: CLEAR
COLOR: YELLOW
EPITHELIAL CELLS, UA: NORMAL /HPF (ref 0–5)
GLUCOSE URINE: NEGATIVE MG/DL
HYALINE CASTS: NORMAL /HPF (ref 0–5)
KETONES, URINE: NEGATIVE MG/DL
LEUKOCYTE ESTERASE, URINE: NEGATIVE
NITRITE, URINE: NEGATIVE
PH UA: 6 (ref 5–9)
PROTEIN UA: NEGATIVE MG/DL
RBC UA: NORMAL /HPF (ref 0–5)
SPECIFIC GRAVITY UA: 1.02 (ref 1–1.03)
UROBILINOGEN, URINE: 0.2 E.U./DL
WBC UA: NORMAL /HPF (ref 0–5)

## 2022-08-08 PROCEDURE — 1036F TOBACCO NON-USER: CPT | Performed by: FAMILY MEDICINE

## 2022-08-08 PROCEDURE — 99214 OFFICE O/P EST MOD 30 MIN: CPT | Performed by: FAMILY MEDICINE

## 2022-08-08 PROCEDURE — G8417 CALC BMI ABV UP PARAM F/U: HCPCS | Performed by: FAMILY MEDICINE

## 2022-08-08 PROCEDURE — G8427 DOCREV CUR MEDS BY ELIG CLIN: HCPCS | Performed by: FAMILY MEDICINE

## 2022-08-08 RX ORDER — LOSARTAN POTASSIUM AND HYDROCHLOROTHIAZIDE 12.5; 1 MG/1; MG/1
1 TABLET ORAL DAILY
Qty: 30 TABLET | Refills: 5 | Status: SHIPPED | OUTPATIENT
Start: 2022-08-08

## 2022-08-08 ASSESSMENT — ENCOUNTER SYMPTOMS
SHORTNESS OF BREATH: 0
BACK PAIN: 1
PHOTOPHOBIA: 0
CHEST TIGHTNESS: 0
ABDOMINAL PAIN: 0
ABDOMINAL DISTENTION: 0

## 2022-08-08 NOTE — PROGRESS NOTES
Diagnosis Orders   1. Radicular low back pain  XR LUMBAR SPINE FLEXION AND EXTENSION ONLY    XR LUMBAR SPINE (2-3 VIEWS)    Urinalysis, Micro      2. Gouty arthritis of left great toe        3. Essential hypertension  losartan-hydroCHLOROthiazide (HYZAAR) 100-12.5 MG per tablet      4. Class 3 severe obesity due to excess calories without serious comorbidity with body mass index (BMI) of 40.0 to 44.9 in adult [facility])          Return for Based on test results, 3 month f/u after dose change for losartan. Patient Instructions   Increase losartan to 100 mg and keep hydrochlorothiazide 12.5 mg    No change in allopurinol. Successfully completed and weaned off lexapro    Lumbar pain radiating into groin, check urine and lumbar xray      Subjective:      Patient ID: Jason Salazar is a 31 y.o. male who presents for:  Chief Complaint   Patient presents with    Anxiety     X 3 month follow up     Groin Pain     Upper groin right side , d/c urinary sx        Patient states as long as he takes his allopurinol twice a day he does very well. He states he gets the evening dose and about 75% of the time. If he misses that a few days in a row he does feel like his toe starts to get stiff but then he resumes twice a day and goes on doing well. Patient does a lot of weight for workouts not cardio. He notes increasing pain in his low back and radiating around into his right groin. Denies discomfort with urination. Denies discomfort with cremasteric muscle activity for the testicle. Denies bulging. Denies change in shape of the testicle. Denies purulent discharge of the penis. Notes much of the pain occurs when he is sitting. When he walks around he feels better    Patient denies cardiovascular symptoms. States overall he is doing well on his medications.   He has not been checking his home blood pressure and we talk about his slowly increasing blood pressure today along with increasing weight.  he is open to changing his medication dose while he works towards losing weight      Current Outpatient Medications on File Prior to Visit   Medication Sig Dispense Refill    allopurinol (ZYLOPRIM) 100 MG tablet Take 1 tablet by mouth 2 times daily 60 tablet 5    pantoprazole (PROTONIX) 40 MG tablet Take 1 tablet by mouth daily as needed (acid reflux) 30 tablet 5    Multiple Vitamins-Minerals (THERAPEUTIC MULTIVITAMIN-MINERALS) tablet Take 1 tablet by mouth daily      triamcinolone (KENALOG) 0.1 % cream Apply topically 2 times daily. 454 g 0     No current facility-administered medications on file prior to visit. Past Medical History:   Diagnosis Date    Scoliosis      History reviewed. No pertinent surgical history. Social History     Socioeconomic History    Marital status: Single     Spouse name: Not on file    Number of children: Not on file    Years of education: Not on file    Highest education level: Not on file   Occupational History    Not on file   Tobacco Use    Smoking status: Never    Smokeless tobacco: Never   Substance and Sexual Activity    Alcohol use: Yes     Comment: occasional    Drug use: Yes     Types: Marijuana Maurita Handsome)    Sexual activity: Yes   Other Topics Concern    Not on file   Social History Narrative    Not on file     Social Determinants of Health     Financial Resource Strain: Low Risk     Difficulty of Paying Living Expenses: Not hard at all   Food Insecurity: No Food Insecurity    Worried About Running Out of Food in the Last Year: Never true    Ran Out of Food in the Last Year: Never true   Transportation Needs: Not on file   Physical Activity: Not on file   Stress: Not on file   Social Connections: Not on file   Intimate Partner Violence: Not on file   Housing Stability: Not on file     Family History   Problem Relation Age of Onset    Heart Disease Maternal Grandfather     High Blood Pressure Maternal Grandfather      Allergies:  Patient has no known allergies.     Review of Systems   Constitutional: Negative for activity change, appetite change, diaphoresis and unexpected weight change. Eyes:  Negative for photophobia and visual disturbance. Respiratory:  Negative for chest tightness and shortness of breath. No orthopnea   Cardiovascular:  Negative for chest pain, palpitations and leg swelling. Gastrointestinal:  Negative for abdominal distention and abdominal pain. Genitourinary:  Negative for flank pain, frequency, scrotal swelling and testicular pain. Musculoskeletal:  Positive for back pain. Negative for gait problem and joint swelling. Neurological:  Negative for dizziness, weakness, light-headedness and headaches. Psychiatric/Behavioral:  Negative for confusion. Objective:   /82   Pulse 97   Temp 98.7 °F (37.1 °C)   Ht 5' 8\" (1.727 m)   Wt 266 lb (120.7 kg)   SpO2 98%   BMI 40.45 kg/m²     Physical Exam  Vitals reviewed. Constitutional:       General: He is not in acute distress. Appearance: He is well-developed. HENT:      Head: Normocephalic and atraumatic. Right Ear: External ear normal.      Left Ear: External ear normal.      Nose: Nose normal.   Eyes:      General:         Right eye: No discharge. Left eye: No discharge. Conjunctiva/sclera: Conjunctivae normal.      Pupils: Pupils are equal, round, and reactive to light. Neck:      Thyroid: No thyromegaly. Cardiovascular:      Rate and Rhythm: Normal rate and regular rhythm. Heart sounds: Normal heart sounds. Pulmonary:      Effort: Pulmonary effort is normal. No respiratory distress. Breath sounds: Normal breath sounds. Abdominal:      General: There is no distension. Musculoskeletal:      Cervical back: Neck supple. Skin:     General: Skin is warm and dry. Neurological:      Mental Status: He is alert and oriented to person, place, and time. Coordination: Coordination normal.   Psychiatric:         Thought Content:  Thought content normal. Judgment: Judgment normal.       No results found for this visit on 08/08/22. No results found for this or any previous visit (from the past 2016 hour(s)). [] Pt was seen by provider for      Minutes  Counseling and coordination of care was done for all assessment diagnosis listed for today with patient and any family/friend present. More than 50% of this visit was spent coordinating current care, obtaining information for prior records, and counseling for current plan of action. Assessment:       Diagnosis Orders   1. Radicular low back pain  XR LUMBAR SPINE FLEXION AND EXTENSION ONLY    XR LUMBAR SPINE (2-3 VIEWS)    Urinalysis, Micro      2. Gouty arthritis of left great toe        3. Essential hypertension  losartan-hydroCHLOROthiazide (HYZAAR) 100-12.5 MG per tablet      4. Class 3 severe obesity due to excess calories without serious comorbidity with body mass index (BMI) of 40.0 to 44.9 in adult [facility])              Orders Placed This Encounter   Procedures    XR LUMBAR SPINE FLEXION AND EXTENSION ONLY     Standing Status:   Future     Number of Occurrences:   1     Standing Expiration Date:   8/8/2023    XR LUMBAR SPINE (2-3 VIEWS)     Standing Status:   Future     Number of Occurrences:   1     Standing Expiration Date:   8/8/2023    Urinalysis, Micro     Standing Status:   Future     Standing Expiration Date:   8/8/2023         Orders Placed This Encounter   Medications    losartan-hydroCHLOROthiazide (HYZAAR) 100-12.5 MG per tablet     Sig: Take 1 tablet by mouth in the morning. Dispense:  30 tablet     Refill:  5            Medication List            Accurate as of August 8, 2022  1:32 PM. If you have any questions, ask your nurse or doctor. START taking these medications      losartan-hydroCHLOROthiazide 100-12.5 MG per tablet  Commonly known as: HYZAAR  Take 1 tablet by mouth in the morning.   Started by: Vadim Jones MD            CONTINUE taking these

## 2022-08-08 NOTE — PATIENT INSTRUCTIONS
Increase losartan to 100 mg and keep hydrochlorothiazide 12.5 mg    No change in allopurinol.      Successfully completed and weaned off lexapro    Lumbar pain radiating into groin, check urine and lumbar xray

## 2022-08-08 NOTE — PROGRESS NOTES
Diagnosis Orders   1. Gouty arthritis of left great toe     2. Acquired flat foot, right     3. Anxiety     4. Cervicalgia [facility] [place]   5. Hip pain, left [facility] [place]   6. Chronic fatigue         Return in about 3 months (around 8/2/2022) for for review of outcome of today's recommendation. Patient Instructions   Pt to continue allopurinol. improving     PT to eval for cervalgia and hip pain which is worsened     May stay off lexapro if feeling well.   controlled

## 2022-11-08 ENCOUNTER — OFFICE VISIT (OUTPATIENT)
Dept: FAMILY MEDICINE CLINIC | Age: 37
End: 2022-11-08
Payer: COMMERCIAL

## 2022-11-08 VITALS
BODY MASS INDEX: 39.4 KG/M2 | OXYGEN SATURATION: 98 % | SYSTOLIC BLOOD PRESSURE: 132 MMHG | HEIGHT: 68 IN | WEIGHT: 260 LBS | DIASTOLIC BLOOD PRESSURE: 82 MMHG | TEMPERATURE: 98.7 F | HEART RATE: 93 BPM

## 2022-11-08 DIAGNOSIS — M10.9 GOUTY ARTHRITIS OF LEFT GREAT TOE: ICD-10-CM

## 2022-11-08 DIAGNOSIS — R79.89 LOW TESTOSTERONE: ICD-10-CM

## 2022-11-08 DIAGNOSIS — I10 ESSENTIAL HYPERTENSION: Primary | ICD-10-CM

## 2022-11-08 DIAGNOSIS — G89.29 CHRONIC BILATERAL LOW BACK PAIN WITHOUT SCIATICA: ICD-10-CM

## 2022-11-08 DIAGNOSIS — M54.50 CHRONIC BILATERAL LOW BACK PAIN WITHOUT SCIATICA: ICD-10-CM

## 2022-11-08 PROCEDURE — 1036F TOBACCO NON-USER: CPT | Performed by: FAMILY MEDICINE

## 2022-11-08 PROCEDURE — 99214 OFFICE O/P EST MOD 30 MIN: CPT | Performed by: FAMILY MEDICINE

## 2022-11-08 PROCEDURE — 3078F DIAST BP <80 MM HG: CPT | Performed by: FAMILY MEDICINE

## 2022-11-08 PROCEDURE — G8427 DOCREV CUR MEDS BY ELIG CLIN: HCPCS | Performed by: FAMILY MEDICINE

## 2022-11-08 PROCEDURE — G8417 CALC BMI ABV UP PARAM F/U: HCPCS | Performed by: FAMILY MEDICINE

## 2022-11-08 PROCEDURE — G8484 FLU IMMUNIZE NO ADMIN: HCPCS | Performed by: FAMILY MEDICINE

## 2022-11-08 PROCEDURE — 3074F SYST BP LT 130 MM HG: CPT | Performed by: FAMILY MEDICINE

## 2022-11-08 RX ORDER — ALLOPURINOL 100 MG/1
100 TABLET ORAL 2 TIMES DAILY
Qty: 60 TABLET | Refills: 5 | Status: SHIPPED | OUTPATIENT
Start: 2022-11-08 | End: 2023-05-07

## 2022-11-08 ASSESSMENT — ENCOUNTER SYMPTOMS
ABDOMINAL PAIN: 0
BACK PAIN: 1
ABDOMINAL DISTENTION: 0
CHEST TIGHTNESS: 0
PHOTOPHOBIA: 0
SHORTNESS OF BREATH: 0

## 2022-11-08 NOTE — PROGRESS NOTES
Diagnosis Orders   1. Essential hypertension  Lipid Panel    Basic Metabolic Panel      2. Gouty arthritis of left great toe  allopurinol (ZYLOPRIM) 100 MG tablet    Uric Acid      3. Chronic bilateral low back pain without sciatica        4. Low testosterone  Testosterone Free and Total, Non-Male        Return in about 6 months (around 5/8/2023) for for review of outcome of today's recommendation. Patient Instructions   No changes in medication    Concentration on static core exercises to alleviate back pain. Low testosterone, supplementation to be considered. Blood pressure well controlled but would be better improved with weight loss. Subjective:      Patient ID: Tyler Moya is a 30 y.o. male who presents for:  Chief Complaint   Patient presents with    Discuss Medications     X 3 month check up        No problem with current medication. Would like allopurinol  Denies cardiovascular symptoms. See review of systems. Continuing to note low back pain. He remains active with his job and with happy but does note preperative exercising    Patient states that he is somewhat interested in looking into testosterone supplementation. We talked about the options and the fact that another lab draw is necessary before final diagnosis      Current Outpatient Medications on File Prior to Visit   Medication Sig Dispense Refill    losartan-hydroCHLOROthiazide (HYZAAR) 100-12.5 MG per tablet Take 1 tablet by mouth in the morning. 30 tablet 5    pantoprazole (PROTONIX) 40 MG tablet Take 1 tablet by mouth daily as needed (acid reflux) 30 tablet 5    Multiple Vitamins-Minerals (THERAPEUTIC MULTIVITAMIN-MINERALS) tablet Take 1 tablet by mouth daily      triamcinolone (KENALOG) 0.1 % cream Apply topically 2 times daily. 454 g 0     No current facility-administered medications on file prior to visit. Past Medical History:   Diagnosis Date    Scoliosis      History reviewed.  No pertinent surgical history. Social History     Socioeconomic History    Marital status: Single     Spouse name: Not on file    Number of children: Not on file    Years of education: Not on file    Highest education level: Not on file   Occupational History    Not on file   Tobacco Use    Smoking status: Never    Smokeless tobacco: Never   Substance and Sexual Activity    Alcohol use: Yes     Comment: occasional    Drug use: Yes     Types: Marijuana [employer])    Sexual activity: Yes   Other Topics Concern    Not on file   Social History Narrative    Not on file     Social Determinants of Health     Financial Resource Strain: Low Risk     Difficulty of Paying Living Expenses: Not hard at all   Food Insecurity: No Food Insecurity    Worried About Running Out of Food in the Last Year: Never true    Ran Out of Food in the Last Year: Never true   Transportation Needs: Not on file   Physical Activity: Not on file   Stress: Not on file   Social Connections: Not on file   Intimate Partner Violence: Not on file   Housing Stability: Not on file     Family History   Problem Relation Age of Onset    Heart Disease Maternal Grandfather     High Blood Pressure Maternal Grandfather      Allergies:  Patient has no known allergies. Review of Systems   Constitutional:  Negative for activity change, appetite change, diaphoresis and unexpected weight change. Eyes:  Negative for photophobia and visual disturbance. Respiratory:  Negative for chest tightness and shortness of breath. No orthopnea   Cardiovascular:  Negative for chest pain, palpitations and leg swelling. Gastrointestinal:  Negative for abdominal distention and abdominal pain. Genitourinary:  Negative for flank pain and frequency. Musculoskeletal:  Positive for back pain. Negative for gait problem and joint swelling. Neurological:  Negative for dizziness, tremors, weakness, light-headedness, numbness and headaches. Psychiatric/Behavioral:  Negative for confusion. Objective:   /82   Pulse 93   Temp 98.7 °F (37.1 °C)   Ht 5' 8\" (1.727 m)   Wt 260 lb (117.9 kg)   SpO2 98%   BMI 39.53 kg/m²     Physical Exam  Vitals reviewed. Constitutional:       General: He is not in acute distress. Appearance: He is well-developed. HENT:      Head: Normocephalic and atraumatic. Right Ear: External ear normal.      Left Ear: External ear normal.      Nose: Nose normal.   Eyes:      General:         Right eye: No discharge. Left eye: No discharge. Conjunctiva/sclera: Conjunctivae normal.      Pupils: Pupils are equal, round, and reactive to light. Neck:      Thyroid: No thyromegaly. Cardiovascular:      Rate and Rhythm: Normal rate and regular rhythm. Pulmonary:      Effort: Pulmonary effort is normal. No respiratory distress. Abdominal:      General: There is no distension. Musculoskeletal:      Cervical back: Neck supple. Skin:     General: Skin is warm and dry. Neurological:      Mental Status: He is alert and oriented to person, place, and time. Coordination: Coordination normal.   Psychiatric:         Thought Content: Thought content normal.         Judgment: Judgment normal.       No results found for this visit on 11/08/22. No results found for this or any previous visit (from the past 2016 hour(s)). [] Pt was seen by provider for      Minutes  Counseling and coordination of care was done for all assessment diagnosis listed for today with patient and any family/friend present. More than 50% of this visit was spent coordinating current care, obtaining information for prior records, and counseling for current plan of action. Assessment:       Diagnosis Orders   1. Essential hypertension  Lipid Panel    Basic Metabolic Panel      2. Gouty arthritis of left great toe  allopurinol (ZYLOPRIM) 100 MG tablet    Uric Acid      3. Chronic bilateral low back pain without sciatica        4.  Low testosterone  Testosterone Free and Total, Non-Male            Orders Placed This Encounter   Procedures    Lipid Panel     Standing Status:   Future     Standing Expiration Date:   43/2/3809    Basic Metabolic Panel     Standing Status:   Future     Standing Expiration Date:   11/8/2023    Uric Acid     Standing Status:   Future     Standing Expiration Date:   11/8/2023    Testosterone Free and Total, Non-Male     Standing Status:   Future     Standing Expiration Date:   11/8/2023         Orders Placed This Encounter   Medications    allopurinol (ZYLOPRIM) 100 MG tablet     Sig: Take 1 tablet by mouth 2 times daily     Dispense:  60 tablet     Refill:  5            Medication List            Accurate as of November 8, 2022  2:27 PM. If you have any questions, ask your nurse or doctor. CONTINUE taking these medications      allopurinol 100 MG tablet  Commonly known as: ZYLOPRIM  Take 1 tablet by mouth 2 times daily     losartan-hydroCHLOROthiazide 100-12.5 MG per tablet  Commonly known as: HYZAAR  Take 1 tablet by mouth in the morning. pantoprazole 40 MG tablet  Commonly known as: Protonix  Take 1 tablet by mouth daily as needed (acid reflux)     therapeutic multivitamin-minerals tablet     triamcinolone 0.1 % cream  Commonly known as: KENALOG  Apply topically 2 times daily. Where to Get Your Medications        These medications were sent to [address] [place] [address] 3, [address] [place] [address]      Phone: [phone]   allopurinol 100 MG tablet           Plan:   Return in about 6 months (around 5/8/2023) for for review of outcome of today's recommendation. Patient Instructions   No changes in medication    Concentration on static core exercises to alleviate back pain. Low testosterone, supplementation to be considered. Blood pressure well controlled but would be better improved with weight loss.     This note was partially created with the assistance of dictation. This may lead to grammatical or spelling errors. Dionisio Lu M.D.

## 2022-11-08 NOTE — PATIENT INSTRUCTIONS
No changes in medication    Concentration on static core exercises to alleviate back pain. Low testosterone, supplementation to be considered. Blood pressure well controlled but would be better improved with weight loss.

## 2022-11-08 NOTE — PROGRESS NOTES
Diagnosis Orders   1. Radicular low back pain  XR LUMBAR SPINE FLEXION AND EXTENSION ONLY     XR LUMBAR SPINE (2-3 VIEWS)     Urinalysis, Micro       2. Gouty arthritis of left great toe          3. Essential hypertension  losartan-hydroCHLOROthiazide (HYZAAR) 100-12.5 MG per tablet       4. Class 3 severe obesity due to excess calories without serious comorbidity with body mass index (BMI) of 40.0 to 44.9 in adult [facility])             Return for Based on test results, 3 month f/u after dose change for losartan. Patient Instructions   Increase losartan to 100 mg and keep hydrochlorothiazide 12.5 mg     No change in allopurinol.       Successfully completed and weaned off lexapro     Lumbar pain radiating into groin, check urine and lumbar xray

## 2022-11-30 DIAGNOSIS — M10.9 GOUTY ARTHRITIS OF LEFT GREAT TOE: ICD-10-CM

## 2022-11-30 DIAGNOSIS — R79.89 LOW TESTOSTERONE: ICD-10-CM

## 2022-11-30 LAB — URIC ACID, SERUM: 6.9 MG/DL (ref 3.4–7)

## 2022-12-01 ENCOUNTER — OFFICE VISIT (OUTPATIENT)
Dept: FAMILY MEDICINE CLINIC | Age: 37
End: 2022-12-01
Payer: COMMERCIAL

## 2022-12-01 VITALS
HEART RATE: 93 BPM | TEMPERATURE: 97.5 F | DIASTOLIC BLOOD PRESSURE: 80 MMHG | WEIGHT: 263.4 LBS | HEIGHT: 68 IN | BODY MASS INDEX: 39.92 KG/M2 | OXYGEN SATURATION: 99 % | SYSTOLIC BLOOD PRESSURE: 132 MMHG

## 2022-12-01 DIAGNOSIS — R10.9 FLANK PAIN: ICD-10-CM

## 2022-12-01 DIAGNOSIS — R10.9 FLANK PAIN: Primary | ICD-10-CM

## 2022-12-01 DIAGNOSIS — E29.1 TESTOSTERONE DEFICIENCY IN MALE: Primary | ICD-10-CM

## 2022-12-01 LAB
BILIRUBIN, POC: NORMAL
BLOOD URINE, POC: NORMAL
CLARITY, POC: CLEAR
COLOR, POC: YELLOW
GLUCOSE URINE, POC: NORMAL
KETONES, POC: NORMAL
LEUKOCYTE EST, POC: NORMAL
NITRITE, POC: NORMAL
PH, POC: 6
PROTEIN, POC: NORMAL
SEX HORMONE BINDING GLOBULIN: 27 NMOL/L (ref 11–80)
SPECIFIC GRAVITY, POC: 1.01
TESTOSTERONE FREE-NONMALE: 38.5 PG/ML (ref 47–244)
TESTOSTERONE TOTAL: 182 NG/DL (ref 220–1000)
UROBILINOGEN, POC: 3.5

## 2022-12-01 PROCEDURE — 3078F DIAST BP <80 MM HG: CPT | Performed by: STUDENT IN AN ORGANIZED HEALTH CARE EDUCATION/TRAINING PROGRAM

## 2022-12-01 PROCEDURE — 81003 URINALYSIS AUTO W/O SCOPE: CPT | Performed by: STUDENT IN AN ORGANIZED HEALTH CARE EDUCATION/TRAINING PROGRAM

## 2022-12-01 PROCEDURE — 1036F TOBACCO NON-USER: CPT | Performed by: STUDENT IN AN ORGANIZED HEALTH CARE EDUCATION/TRAINING PROGRAM

## 2022-12-01 PROCEDURE — 3074F SYST BP LT 130 MM HG: CPT | Performed by: STUDENT IN AN ORGANIZED HEALTH CARE EDUCATION/TRAINING PROGRAM

## 2022-12-01 PROCEDURE — G8484 FLU IMMUNIZE NO ADMIN: HCPCS | Performed by: STUDENT IN AN ORGANIZED HEALTH CARE EDUCATION/TRAINING PROGRAM

## 2022-12-01 PROCEDURE — G8417 CALC BMI ABV UP PARAM F/U: HCPCS | Performed by: STUDENT IN AN ORGANIZED HEALTH CARE EDUCATION/TRAINING PROGRAM

## 2022-12-01 PROCEDURE — G8427 DOCREV CUR MEDS BY ELIG CLIN: HCPCS | Performed by: STUDENT IN AN ORGANIZED HEALTH CARE EDUCATION/TRAINING PROGRAM

## 2022-12-01 PROCEDURE — 99213 OFFICE O/P EST LOW 20 MIN: CPT | Performed by: STUDENT IN AN ORGANIZED HEALTH CARE EDUCATION/TRAINING PROGRAM

## 2022-12-01 RX ORDER — TESTOSTERONE ENANTHATE 200 MG/ML
1 VIAL (ML) INTRAMUSCULAR
Qty: 6 ML | Refills: 0 | Status: SHIPPED | OUTPATIENT
Start: 2022-12-01 | End: 2022-12-01

## 2022-12-01 RX ORDER — TESTOSTERONE CYPIONATE 200 MG/ML
100 INJECTION INTRAMUSCULAR
Qty: 1 ML | Refills: 0 | Status: SHIPPED | OUTPATIENT
Start: 2022-12-01 | End: 2036-11-14

## 2022-12-01 ASSESSMENT — ENCOUNTER SYMPTOMS
NAUSEA: 0
SINUS PRESSURE: 0
DIARRHEA: 0
SHORTNESS OF BREATH: 0
COUGH: 0
ABDOMINAL PAIN: 0
SORE THROAT: 0
VOMITING: 0

## 2022-12-01 NOTE — RESULT ENCOUNTER NOTE
Notify patient uric acid is controlled. Testosterone remains below normal.  Is he interested in supplementation? Would he like us to get him signed up for the testosterone clinic?

## 2022-12-01 NOTE — RESULT ENCOUNTER NOTE
Refer patient to be scheduled with testosterone clinic.   I will send in the prescription for the testosterone to his pharmacy needs to repeat testosterone labs 3 months after starting

## 2022-12-01 NOTE — PROGRESS NOTES
2022    Fe Mccullough (:  1985) is a 30 y.o. male, here for evaluation of the following medical concerns:  Chief Complaint   Patient presents with    Back Pain     Radiating into shoulder and rib. X 4, more mild then the start. Pt admits to having a few beers over holiday that may have effected things      HPI  Flank pain  Right sided  Started 4 days ago  Pain has been improving today  Pain comes in waves  Sharp, stabbing pain  Radiating to anterior ribs and shoulder    Drank more beer recently due to the holidays   Hasn't had alcohol in the last 48 hours  Pain and seemed to be better today      No history of kidney stones however history of gout  Has never had gallbladder disease in the past  Denied urinary symptoms    Review of Systems   Constitutional:  Negative for chills and fever. HENT:  Negative for congestion, sinus pressure and sore throat. Respiratory:  Negative for cough and shortness of breath. Cardiovascular:  Negative for chest pain and palpitations. Gastrointestinal:  Negative for abdominal pain, diarrhea, nausea and vomiting. Genitourinary:  Positive for flank pain. Musculoskeletal:  Negative for arthralgias and myalgias. Skin:  Negative for rash and wound. Neurological:  Negative for speech difficulty and light-headedness. Psychiatric/Behavioral:  Negative for suicidal ideas. The patient is not nervous/anxious. Prior to Visit Medications    Medication Sig Taking? Authorizing Provider   allopurinol (ZYLOPRIM) 100 MG tablet Take 1 tablet by mouth 2 times daily Yes Antonette Kellogg MD   losartan-hydroCHLOROthiazide (HYZAAR) 100-12.5 MG per tablet Take 1 tablet by mouth in the morning.  Yes Antonette Kellogg MD   pantoprazole (PROTONIX) 40 MG tablet Take 1 tablet by mouth daily as needed (acid reflux) Yes Antonette Kellogg MD   Multiple Vitamins-Minerals (THERAPEUTIC MULTIVITAMIN-MINERALS) tablet Take 1 tablet by mouth daily Yes Historical Provider, MD testosterone cypionate (DEPOTESTOTERONE CYPIONATE) 200 MG/ML injection Inject 0.5 mLs into the muscle every 14 days for 365 doses. Patient not taking: Reported on 12/1/2022  Scott Hernandez MD        Medications Discontinued During This Encounter   Medication Reason    triamcinolone (KENALOG) 0.1 % cream Therapy completed       No Known Allergies    Past Medical History:   Diagnosis Date    Scoliosis        No past surgical history on file. Social History     Socioeconomic History    Marital status: Single     Spouse name: Not on file    Number of children: Not on file    Years of education: Not on file    Highest education level: Not on file   Occupational History    Not on file   Tobacco Use    Smoking status: Never    Smokeless tobacco: Never   Substance and Sexual Activity    Alcohol use: Yes     Comment: occasional    Drug use: Yes     Types: Marijuana Alka Catarino)    Sexual activity: Yes   Other Topics Concern    Not on file   Social History Narrative    Not on file     Social Determinants of Health     Financial Resource Strain: Low Risk     Difficulty of Paying Living Expenses: Not hard at all   Food Insecurity: No Food Insecurity    Worried About Running Out of Food in the Last Year: Never true    Ran Out of Food in the Last Year: Never true   Transportation Needs: Not on file   Physical Activity: Not on file   Stress: Not on file   Social Connections: Not on file   Intimate Partner Violence: Not on file   Housing Stability: Not on file        Family History   Problem Relation Age of Onset    Heart Disease Maternal Grandfather     High Blood Pressure Maternal Grandfather        Vitals:    12/01/22 1548   BP: 132/80   Pulse: 93   Temp: 97.5 °F (36.4 °C)   SpO2: 99%   Weight: 263 lb 6.4 oz (119.5 kg)   Height: 5' 8\" (1.727 m)       Estimated body mass index is 40.05 kg/m² as calculated from the following:    Height as of this encounter: 5' 8\" (1.727 m).     Weight as of this encounter: 263 lb 6.4 oz (119.5 kg).    No results for input(s): WBC, RBC, HGB, HCT, MCV, MCH, MCHC, RDW, PLT, MPV in the last 72 hours. No results for input(s): NA, K, CL, CO2, BUN, CREATININE, GLUCOSE, CALCIUM, PROT, LABALBU, BILITOT, ALKPHOS, AST, ALT in the last 72 hours. Lab Results   Component Value Date/Time    LABA1C 5.1 03/21/2022 04:30 PM       No results found. Physical Exam  Constitutional:       General: He is not in acute distress. Appearance: Normal appearance. HENT:      Head: Normocephalic and atraumatic. Eyes:      Extraocular Movements: Extraocular movements intact. Conjunctiva/sclera: Conjunctivae normal.   Abdominal:      Palpations: Abdomen is soft. Tenderness: There is no abdominal tenderness. There is no right CVA tenderness, left CVA tenderness, guarding or rebound. Musculoskeletal:         General: No deformity. Normal range of motion. Skin:     Findings: No lesion or rash. Comments: No overlying skin changes of the right flank   Neurological:      General: No focal deficit present. Mental Status: He is alert. Mental status is at baseline. Psychiatric:         Mood and Affect: Mood normal.         Behavior: Behavior normal.         Thought Content: Thought content normal.       ASSESSMENT/PLAN:  1. Flank pain  UA normal, will send for urine culture  Rule out kidney stone due to history of gout and recent increase in beer intake over the holidays  KUB as ordered  No pain with palpation of right upper quadrant indicating gallbladder disease  Recommended plenty of fluids and avoiding beer over the next several days  Recent uric acid 6.9    - POCT Urinalysis No Micro (Auto)  - Culture, Urine; Future  - XR ABDOMEN (KUB) (SINGLE AP VIEW);  Future      Medications Discontinued During This Encounter   Medication Reason    triamcinolone (KENALOG) 0.1 % cream Therapy completed     ---------------------------------------------------------------------  Side effects, adverse effects of the medication prescribed today, as well as treatment plan/ rationale and result expectations have been discussed with the patient who expresses understanding and desires to proceed. Close follow up to evaluate treatment results and for coordination of care. I have reviewed the patient's medical history in detail and updated the computerized patient record. As always, patient is advised that if symptoms worsen in any way they will proceed to the nearest emergency room. --------------------------------------------------------------------    Return if symptoms worsen or fail to improve. An  electronic signature was used to authenticate this note.     --Beena Ibarra DO on 12/1/2022 at 4:10 PM

## 2022-12-02 LAB — URINE CULTURE, ROUTINE: NORMAL

## 2022-12-14 PROBLEM — R79.89 LOW TESTOSTERONE: Status: ACTIVE | Noted: 2022-12-14

## 2022-12-15 DIAGNOSIS — F41.9 ANXIETY: ICD-10-CM

## 2022-12-15 RX ORDER — ESCITALOPRAM OXALATE 10 MG/1
10 TABLET ORAL DAILY
Qty: 30 TABLET | Refills: 3 | Status: SHIPPED | OUTPATIENT
Start: 2022-12-15

## 2022-12-15 NOTE — TELEPHONE ENCOUNTER
Comments: pt states he is needing the 10 mg vs the 5 mg. Also pt is asking about the PA on the testosterone. Pt has not heard anything on the PA. Please advise. Pt phone number is [phone]. Last Office Visit (last PCP visit):   11/8/2022    Next Visit Date:  Future Appointments   Date Time Provider Sudhir Anders   5/8/2023 11:15 AM Devika Wagner MD [facility] EMERGENCY [facility] CENTER AT [place]       **If hasn't been seen in over a year OR hasn't followed up according to last diabetes/ADHD visit, make appointment for patient before sending refill to provider.     Rx requested:  Requested Prescriptions     Pending Prescriptions Disp Refills    escitalopram (LEXAPRO) 10 MG tablet 30 tablet 3     Sig: Take 1 tablet by mouth daily

## 2022-12-16 ENCOUNTER — TELEPHONE (OUTPATIENT)
Dept: FAMILY MEDICINE CLINIC | Age: 37
End: 2022-12-16

## 2022-12-16 NOTE — TELEPHONE ENCOUNTER
Due to recent labs pt need to start Testosterone. Due to having [facility] PA was needed. Through [employer] done and approved, see attached.      Spoke to pt, aware of approval and roz for the 19th

## 2022-12-19 ENCOUNTER — NURSE ONLY (OUTPATIENT)
Dept: FAMILY MEDICINE CLINIC | Age: 37
End: 2022-12-19
Payer: COMMERCIAL

## 2022-12-19 DIAGNOSIS — E29.1 TESTOSTERONE DEFICIENCY IN MALE: Primary | ICD-10-CM

## 2022-12-19 PROCEDURE — 96372 THER/PROPH/DIAG INJ SC/IM: CPT | Performed by: FAMILY MEDICINE

## 2022-12-19 RX ORDER — TESTOSTERONE CYPIONATE 200 MG/ML
100 INJECTION INTRAMUSCULAR ONCE
Status: COMPLETED | OUTPATIENT
Start: 2022-12-19 | End: 2022-12-19

## 2022-12-19 RX ADMIN — TESTOSTERONE CYPIONATE 100 MG: 200 INJECTION INTRAMUSCULAR at 15:04

## 2022-12-19 NOTE — PROGRESS NOTES
Patient given Testosterone 100mg IM right gluteal..  Will return in 2 weeks for next injection    Patient tolerated injection well.     Administrations This Visit       testosterone cypionate (DEPOTESTOTERONE CYPIONATE) injection 100 mg       Admin Date  12/19/2022 Action  Given Dose  100 mg Route  [facility] Administered By  Cheryl Coats LPN

## 2023-01-04 ENCOUNTER — NURSE ONLY (OUTPATIENT)
Dept: FAMILY MEDICINE CLINIC | Age: 38
End: 2023-01-04
Payer: COMMERCIAL

## 2023-01-04 DIAGNOSIS — R79.89 LOW TESTOSTERONE: Primary | ICD-10-CM

## 2023-01-04 PROCEDURE — 96372 THER/PROPH/DIAG INJ SC/IM: CPT | Performed by: FAMILY MEDICINE

## 2023-01-04 RX ORDER — TESTOSTERONE CYPIONATE 200 MG/ML
100 INJECTION INTRAMUSCULAR ONCE
Status: COMPLETED | OUTPATIENT
Start: 2023-01-04 | End: 2023-01-04

## 2023-01-04 RX ADMIN — TESTOSTERONE CYPIONATE 100 MG: 200 INJECTION INTRAMUSCULAR at 09:39

## 2023-01-04 NOTE — PROGRESS NOTES
Patient given Testosterone 100mg IM left gluteal..  Will return in 2 weeks for next injection    Patient tolerated injection well.     Administrations This Visit       testosterone cypionate (DEPOTESTOTERONE CYPIONATE) injection 100 mg       Admin Date  01/04/2023 Action  Given Dose  100 mg Route  [facility] Administered By  Davon Arreola LPN

## 2023-01-18 ENCOUNTER — NURSE ONLY (OUTPATIENT)
Dept: ENDOCRINOLOGY | Age: 38
End: 2023-01-18
Payer: COMMERCIAL

## 2023-01-18 DIAGNOSIS — R79.89 LOW TESTOSTERONE: Primary | ICD-10-CM

## 2023-01-18 PROCEDURE — 96372 THER/PROPH/DIAG INJ SC/IM: CPT | Performed by: INTERNAL MEDICINE

## 2023-01-18 RX ORDER — TESTOSTERONE CYPIONATE 200 MG/ML
200 INJECTION INTRAMUSCULAR ONCE
Status: COMPLETED | OUTPATIENT
Start: 2023-01-18 | End: 2023-01-18

## 2023-01-18 RX ADMIN — TESTOSTERONE CYPIONATE 100 MG: 200 INJECTION INTRAMUSCULAR at 14:31

## 2023-01-18 NOTE — PROGRESS NOTES
Patient given Testosterone 100mg IM right gluteal..  Will return in 2 weeks for next injection    Patient tolerated injection well.     Administrations This Visit       testosterone cypionate (DEPOTESTOTERONE CYPIONATE) injection 200 mg       Admin Date  01/18/2023 Action  Given Dose  100 mg Route  [facility] Administered By  Terrell Clark LPN

## 2023-02-01 ENCOUNTER — NURSE ONLY (OUTPATIENT)
Dept: ENDOCRINOLOGY | Age: 38
End: 2023-02-01
Payer: COMMERCIAL

## 2023-02-01 DIAGNOSIS — R79.89 LOW TESTOSTERONE: Primary | ICD-10-CM

## 2023-02-01 PROCEDURE — 96372 THER/PROPH/DIAG INJ SC/IM: CPT | Performed by: INTERNAL MEDICINE

## 2023-02-01 RX ORDER — TESTOSTERONE CYPIONATE 200 MG/ML
200 INJECTION INTRAMUSCULAR ONCE
Status: COMPLETED | OUTPATIENT
Start: 2023-02-01 | End: 2023-02-01

## 2023-02-01 RX ADMIN — TESTOSTERONE CYPIONATE 100 MG: 200 INJECTION INTRAMUSCULAR at 14:33

## 2023-02-01 NOTE — PROGRESS NOTES
Patient given Testosterone 100mg IM right gluteal..  Will return in 2 weeks for next injection    Patient tolerated injection well.    Administrations This Visit       testosterone cypionate (DEPOTESTOTERONE CYPIONATE) injection 200 mg       Admin Date  02/01/2023 Action  Given Dose  100 mg Route  [facility] Administered By  Cheryl Dasilva LPN

## 2023-02-15 ENCOUNTER — NURSE ONLY (OUTPATIENT)
Dept: ENDOCRINOLOGY | Age: 38
End: 2023-02-15
Payer: COMMERCIAL

## 2023-02-15 DIAGNOSIS — R79.89 LOW TESTOSTERONE: Primary | ICD-10-CM

## 2023-02-15 PROCEDURE — 96372 THER/PROPH/DIAG INJ SC/IM: CPT | Performed by: INTERNAL MEDICINE

## 2023-02-15 RX ORDER — TESTOSTERONE CYPIONATE 200 MG/ML
100 INJECTION INTRAMUSCULAR ONCE
Status: COMPLETED | OUTPATIENT
Start: 2023-02-15 | End: 2023-02-15

## 2023-02-15 RX ADMIN — TESTOSTERONE CYPIONATE 100 MG: 200 INJECTION INTRAMUSCULAR at 14:45

## 2023-02-15 NOTE — PROGRESS NOTES
Patient given Testosterone 100mg IM left gluteal..  Will return in 2 weeks for next injection    Patient tolerated injection well.    Administrations This Visit       testosterone cypionate (DEPOTESTOTERONE CYPIONATE) injection 100 mg       Admin Date  02/15/2023 Action  Given Dose  100 mg Route  [facility] Administered By  Cheryl Dasilva LPN

## 2023-02-21 DIAGNOSIS — I10 ESSENTIAL HYPERTENSION: ICD-10-CM

## 2023-02-22 RX ORDER — LOSARTAN POTASSIUM AND HYDROCHLOROTHIAZIDE 12.5; 1 MG/1; MG/1
1 TABLET ORAL DAILY
Qty: 30 TABLET | Refills: 5 | Status: SHIPPED | OUTPATIENT
Start: 2023-02-22

## 2023-02-22 RX ORDER — LOSARTAN POTASSIUM AND HYDROCHLOROTHIAZIDE 12.5; 1 MG/1; MG/1
1 TABLET ORAL DAILY
Qty: 30 TABLET | Refills: 5 | OUTPATIENT
Start: 2023-02-22

## 2023-02-28 ENCOUNTER — OFFICE VISIT (OUTPATIENT)
Dept: FAMILY MEDICINE CLINIC | Age: 38
End: 2023-02-28

## 2023-02-28 VITALS
OXYGEN SATURATION: 98 % | BODY MASS INDEX: 37.89 KG/M2 | TEMPERATURE: 96.8 F | DIASTOLIC BLOOD PRESSURE: 70 MMHG | HEIGHT: 68 IN | HEART RATE: 83 BPM | SYSTOLIC BLOOD PRESSURE: 128 MMHG | WEIGHT: 250 LBS

## 2023-02-28 DIAGNOSIS — J01.10 ACUTE NON-RECURRENT FRONTAL SINUSITIS: Primary | ICD-10-CM

## 2023-02-28 DIAGNOSIS — R05.1 ACUTE COUGH: ICD-10-CM

## 2023-02-28 LAB
INFLUENZA A ANTIBODY: NEGATIVE
INFLUENZA B ANTIBODY: NEGATIVE
Lab: NORMAL
PERFORMING INSTRUMENT: NORMAL
QC PASS/FAIL: NORMAL
SARS-COV-2, POC: NORMAL

## 2023-02-28 RX ORDER — AMOXICILLIN AND CLAVULANATE POTASSIUM 875; 125 MG/1; MG/1
1 TABLET, FILM COATED ORAL 2 TIMES DAILY
Qty: 20 TABLET | Refills: 0 | Status: SHIPPED | OUTPATIENT
Start: 2023-02-28 | End: 2023-03-10

## 2023-02-28 SDOH — ECONOMIC STABILITY: HOUSING INSECURITY
IN THE LAST 12 MONTHS, WAS THERE A TIME WHEN YOU DID NOT HAVE A STEADY PLACE TO SLEEP OR SLEPT IN A SHELTER (INCLUDING NOW)?: NO

## 2023-02-28 SDOH — ECONOMIC STABILITY: FOOD INSECURITY: WITHIN THE PAST 12 MONTHS, THE FOOD YOU BOUGHT JUST DIDN'T LAST AND YOU DIDN'T HAVE MONEY TO GET MORE.: NEVER TRUE

## 2023-02-28 SDOH — ECONOMIC STABILITY: FOOD INSECURITY: WITHIN THE PAST 12 MONTHS, YOU WORRIED THAT YOUR FOOD WOULD RUN OUT BEFORE YOU GOT MONEY TO BUY MORE.: NEVER TRUE

## 2023-02-28 SDOH — ECONOMIC STABILITY: INCOME INSECURITY: HOW HARD IS IT FOR YOU TO PAY FOR THE VERY BASICS LIKE FOOD, HOUSING, MEDICAL CARE, AND HEATING?: NOT HARD AT ALL

## 2023-02-28 ASSESSMENT — ENCOUNTER SYMPTOMS
SHORTNESS OF BREATH: 0
CHEST TIGHTNESS: 0
WHEEZING: 0
SORE THROAT: 0
NAUSEA: 1
VOICE CHANGE: 0
VOMITING: 0
RHINORRHEA: 1
COUGH: 1
SINUS PAIN: 1
DIARRHEA: 0
FACIAL SWELLING: 0
STRIDOR: 0
TROUBLE SWALLOWING: 0
SINUS PRESSURE: 1
ABDOMINAL PAIN: 0

## 2023-02-28 ASSESSMENT — PATIENT HEALTH QUESTIONNAIRE - PHQ9
SUM OF ALL RESPONSES TO PHQ QUESTIONS 1-9: 0
SUM OF ALL RESPONSES TO PHQ QUESTIONS 1-9: 0
1. LITTLE INTEREST OR PLEASURE IN DOING THINGS: 0
SUM OF ALL RESPONSES TO PHQ QUESTIONS 1-9: 0
2. FEELING DOWN, DEPRESSED OR HOPELESS: 0
SUM OF ALL RESPONSES TO PHQ9 QUESTIONS 1 & 2: 0
SUM OF ALL RESPONSES TO PHQ QUESTIONS 1-9: 0

## 2023-02-28 NOTE — PROGRESS NOTES
Subjective:      Patient ID: Alicia Howell is a 30 y.o. male who presents today for:  Chief Complaint   Patient presents with    Congestion     X1week, tx: OTC medication, bendryl    Cough       HPI  Patient is here with c/o cough and congestion for the last week. Took 2 covid test that have been negative. Says he is SOB and wheezing. Says he is not a smoker and has no underlying lung issues. Says he has HA, sinus pain and pressure. No ear pain. Says he has thick yellow mucus. Denies fever, chills, or muscle aches. Denies any GI sx. Says normally does not stay ill this long. Says he is taking the last few days taking cough syrup and allergy medication. Past Medical History:   Diagnosis Date    Scoliosis      No past surgical history on file. Social History     Socioeconomic History    Marital status: Single     Spouse name: Not on file    Number of children: Not on file    Years of education: Not on file    Highest education level: Not on file   Occupational History    Not on file   Tobacco Use    Smoking status: Never    Smokeless tobacco: Never   Substance and Sexual Activity    Alcohol use: Yes     Comment: occasional    Drug use: Yes     Types: Marijuana Layton Chaitanya)    Sexual activity: Yes   Other Topics Concern    Not on file   Social History Narrative    Not on file     Social Determinants of Health     Financial Resource Strain: Low Risk     Difficulty of Paying Living Expenses: Not hard at all   Food Insecurity: No Food Insecurity    Worried About [address] in the Last Year: Never true    [address] in the Last Year: Never true   Transportation Needs: Unknown    Lack of Transportation (Medical): Not on file    Lack of Transportation (Non-Medical):  No   Physical Activity: Not on file   Stress: Not on file   Social Connections: Not on file   Intimate Partner Violence: Not on file   Housing Stability: Unknown    Unable to Pay for Housing in the Last Year: Not on file Number of Places Lived in the Last Year: Not on file    Unstable Housing in the Last Year: No     Family History   Problem Relation Age of Onset    Heart Disease Maternal Grandfather     High Blood Pressure Maternal Grandfather      No Known Allergies  Current Outpatient Medications   Medication Sig Dispense Refill    amoxicillin-clavulanate (AUGMENTIN) 875-125 MG per tablet Take 1 tablet by mouth 2 times daily for 10 days 20 tablet 0    losartan-hydroCHLOROthiazide (HYZAAR) 100-12.5 MG per tablet Take 1 tablet by mouth daily 30 tablet 5    escitalopram (LEXAPRO) 10 MG tablet Take 1 tablet by mouth daily 30 tablet 3    testosterone cypionate (DEPOTESTOTERONE CYPIONATE) 200 MG/ML injection Inject 0.5 mLs into the muscle every 14 days for 365 doses. 1 mL 0    allopurinol (ZYLOPRIM) 100 MG tablet Take 1 tablet by mouth 2 times daily 60 tablet 5    pantoprazole (PROTONIX) 40 MG tablet Take 1 tablet by mouth daily as needed (acid reflux) 30 tablet 5    Multiple Vitamins-Minerals (THERAPEUTIC MULTIVITAMIN-MINERALS) tablet Take 1 tablet by mouth daily       No current facility-administered medications for this visit. Review of Systems   Constitutional:  Positive for activity change and diaphoresis. Negative for appetite change, chills, fatigue, fever and unexpected weight change. HENT:  Positive for congestion, postnasal drip, rhinorrhea, sinus pressure and sinus pain. Negative for ear discharge, ear pain, facial swelling, sneezing, sore throat, tinnitus, trouble swallowing and voice change. Respiratory:  Positive for cough. Negative for chest tightness, shortness of breath, wheezing and stridor. Cardiovascular:  Negative for chest pain. Gastrointestinal:  Positive for nausea. Negative for abdominal pain, diarrhea and vomiting. Musculoskeletal:  Negative for arthralgias and myalgias. Skin:  Negative for rash. Neurological:  Positive for headaches.  Negative for dizziness, weakness and light-headedness.   Hematological:  Negative for adenopathy.     Objective:   /70   Pulse 83   Temp 96.8 °F (36 °C) (Temporal)   Ht 5' 8\" (1.727 m) Comment: per pt  Wt 250 lb (113.4 kg) Comment: per pt  SpO2 98%   BMI 38.01 kg/m²     Physical Exam  Vitals reviewed.   Constitutional:       General: He is awake. He is not in acute distress.     Appearance: Normal appearance. He is well-developed, well-groomed and normal weight. He is not ill-appearing, toxic-appearing or diaphoretic.   HENT:      Head: Normocephalic and atraumatic.      Right Ear: Hearing, tympanic membrane, ear canal and external ear normal. There is no impacted cerumen.      Left Ear: Hearing, tympanic membrane, ear canal and external ear normal. There is no impacted cerumen.      Nose: Congestion and rhinorrhea present.      Right Turbinates: Swollen.      Left Turbinates: Swollen.      Right Sinus: No maxillary sinus tenderness or frontal sinus tenderness.      Left Sinus: No maxillary sinus tenderness or frontal sinus tenderness.      Mouth/Throat:      Lips: Pink.      Mouth: Mucous membranes are moist. No oral lesions.      Dentition: No gum lesions.      Tongue: No lesions.      Palate: No lesions.      Pharynx: Oropharynx is clear. Uvula midline. No pharyngeal swelling, oropharyngeal exudate, posterior oropharyngeal erythema or uvula swelling.      Tonsils: No tonsillar exudate or tonsillar abscesses. 0 on the right. 0 on the left.      Comments: PND present.  Eyes:      General: Lids are normal.      Extraocular Movements: Extraocular movements intact.      Conjunctiva/sclera: Conjunctivae normal.   Neck:      Trachea: Trachea normal.   Cardiovascular:      Rate and Rhythm: Normal rate and regular rhythm.      Pulses: Normal pulses.      Heart sounds: Normal heart sounds, S1 normal and S2 normal.   Pulmonary:      Effort: Pulmonary effort is normal.      Breath sounds: Normal breath sounds and air entry.   Musculoskeletal:         General: Normal range of motion. Cervical back: Normal range of motion and neck supple. No tenderness. Lymphadenopathy:      Cervical: No cervical adenopathy. Skin:     General: Skin is warm and dry. Capillary Refill: Capillary refill takes less than 2 seconds. Neurological:      General: No focal deficit present. Mental Status: He is alert and oriented to person, place, and time. Mental status is at baseline. Psychiatric:         Attention and Perception: Attention and perception normal.         Mood and Affect: Mood and affect normal.         Speech: Speech normal.         Behavior: Behavior normal. Behavior is cooperative. Thought Content: Thought content normal.         Cognition and Memory: Cognition and memory normal.         Judgment: Judgment normal.       Assessment:       Diagnosis Orders   1. Acute non-recurrent frontal sinusitis  amoxicillin-clavulanate (AUGMENTIN) 875-125 MG per tablet      2. Acute cough  POCT Influenza A/B    POCT COVID-19, Antigen        Results for POC orders placed in visit on 02/28/23   POCT Influenza A/B   Result Value Ref Range    Influenza A Ab negative     Influenza B Ab negative       Plan:     Assessment & Plan   Monet Mullen was seen today for congestion and cough. Diagnoses and all orders for this visit:    Acute non-recurrent frontal sinusitis  -     amoxicillin-clavulanate (AUGMENTIN) 875-125 MG per tablet; Take 1 tablet by mouth 2 times daily for 10 days    Acute cough  -     POCT Influenza A/B  -     POCT COVID-19, Antigen    Will treat for bacterial sinus infection. Advised to take ANTB with food and increase fluids. Discussed administration and SE of the ANTB as well as using probiotic or eating yogurt while on the ANTB. Advised may use OTC decongestant and nasal steroid to help with symptoms as needed. Discussed with patient should start to see improvement within 3-4 days and should f/u with worsening sx or with no improvement. Antibiotic Instructions: Complete the full course of antibiotics as ordered. Take each dose with a small snack or meal to lessen potential GI upset. To prevent antibiotic resistance, please take medication as ordered and for the full duration even if you start to feel better. Consider intake of yogurt or probiotic during antibiotic use and for a few days after to help reduce the risk of developing a secondary infection. Separate the yogurt and antibiotic by at least 1 hour. Avoid alcohol while taking antibiotics. Orders Placed This Encounter   Procedures    POCT Influenza A/B    POCT COVID-19, Antigen     Order Specific Question:   Pregnant: Answer:   No     Orders Placed This Encounter   Medications    amoxicillin-clavulanate (AUGMENTIN) 875-125 MG per tablet     Sig: Take 1 tablet by mouth 2 times daily for 10 days     Dispense:  20 tablet     Refill:  0     There are no discontinued medications. Return for if symptoms do not improve in 3-5 days, worsening of condition. Reviewed with the patient/family: current clinical status & medications. Side effects of the medication prescribed today, as well as treatment plan/rationale and result expectations have been discussed with the patient/family who expresses understanding. Patient will be discharged home in stable condition. Follow up with PCP to evaluate treatment results or return if symptoms worsen or fail to improve. Discussed signs and symptoms which require immediate follow-up in ED/call to 911. Understanding verbalized. I have reviewed the patient's medical history in detail and updated the computerized patient record.     Dilan Jasso, ANN - CNP

## 2023-03-01 ENCOUNTER — NURSE ONLY (OUTPATIENT)
Dept: FAMILY MEDICINE CLINIC | Age: 38
End: 2023-03-01
Payer: COMMERCIAL

## 2023-03-01 DIAGNOSIS — R79.89 LOW TESTOSTERONE: Primary | ICD-10-CM

## 2023-03-01 PROCEDURE — 96372 THER/PROPH/DIAG INJ SC/IM: CPT | Performed by: FAMILY MEDICINE

## 2023-03-01 RX ORDER — TESTOSTERONE CYPIONATE 200 MG/ML
100 INJECTION INTRAMUSCULAR ONCE
Status: COMPLETED | OUTPATIENT
Start: 2023-03-01 | End: 2023-03-01

## 2023-03-01 RX ADMIN — TESTOSTERONE CYPIONATE 100 MG: 200 INJECTION INTRAMUSCULAR at 14:41

## 2023-03-01 NOTE — PROGRESS NOTES
Patient given Testosterone 100mg IM right gluteal..  Will return in 2 weeks for next injection    Patient tolerated injection well.     Administrations This Visit       testosterone cypionate (DEPOTESTOTERONE CYPIONATE) injection 100 mg       Admin Date  03/01/2023 Action  Given Dose  100 mg Route  Intra[facility]ular Administered By  Shelbi Andersen LPN

## 2023-03-15 ENCOUNTER — NURSE ONLY (OUTPATIENT)
Dept: ENDOCRINOLOGY | Age: 38
End: 2023-03-15
Payer: COMMERCIAL

## 2023-03-15 DIAGNOSIS — R79.89 LOW TESTOSTERONE: Primary | ICD-10-CM

## 2023-03-15 PROCEDURE — 96372 THER/PROPH/DIAG INJ SC/IM: CPT | Performed by: INTERNAL MEDICINE

## 2023-03-15 RX ORDER — TESTOSTERONE CYPIONATE 200 MG/ML
100 INJECTION INTRAMUSCULAR ONCE
Status: COMPLETED | OUTPATIENT
Start: 2023-03-15 | End: 2023-03-15

## 2023-03-15 RX ADMIN — TESTOSTERONE CYPIONATE 100 MG: 200 INJECTION INTRAMUSCULAR at 15:02

## 2023-03-15 NOTE — PROGRESS NOTES
Patient given Testosterone 100mg IM left gluteal..  Will return in 2 weeks for next injection    Patient tolerated injection well.     Administrations This Visit       testosterone cypionate (DEPOTESTOTERONE CYPIONATE) injection 100 mg       Admin Date  03/15/2023 Action  Given Dose  100 mg Route  IntraMUSCular Administered By  Pham Bustos LPN

## 2023-03-29 ENCOUNTER — NURSE ONLY (OUTPATIENT)
Dept: ENDOCRINOLOGY | Age: 38
End: 2023-03-29

## 2023-03-29 DIAGNOSIS — R79.89 LOW TESTOSTERONE: Primary | ICD-10-CM

## 2023-03-29 RX ORDER — TESTOSTERONE CYPIONATE 200 MG/ML
200 INJECTION, SOLUTION INTRAMUSCULAR ONCE
Status: COMPLETED | OUTPATIENT
Start: 2023-03-29 | End: 2023-03-29

## 2023-03-29 RX ADMIN — TESTOSTERONE CYPIONATE 100 MG: 200 INJECTION, SOLUTION INTRAMUSCULAR at 15:10

## 2023-03-29 NOTE — PROGRESS NOTES
Patient given Testosterone 100mg IM right gluteal..  Will return in 2 weeks for next injection    Patient tolerated injection well.     Administrations This Visit       testosterone cypionate (DEPOTESTOTERONE CYPIONATE) injection 200 mg       Admin Date  03/29/2023 Action  Given Dose  100 mg Route  IntraMUSCular Administered By  Bird Diallo LPN

## 2023-04-20 DIAGNOSIS — E29.1 TESTOSTERONE DEFICIENCY IN MALE: ICD-10-CM

## 2023-04-20 DIAGNOSIS — E29.1 TESTOSTERONE DEFICIENCY IN MALE: Primary | ICD-10-CM

## 2023-04-20 RX ORDER — TESTOSTERONE CYPIONATE 200 MG/ML
100 INJECTION, SOLUTION INTRAMUSCULAR
Qty: 1 ML | Refills: 0 | Status: SHIPPED | OUTPATIENT
Start: 2023-04-20 | End: 2037-04-03

## 2023-04-20 NOTE — TELEPHONE ENCOUNTER
----- Message from Nancy Ruth MD sent at 4/19/2023  7:17 PM EDT -----  Please pend these orders  ----- Message -----  From: ELLE Urbina  Sent: 4/12/2023   2:50 PM EDT  To: MD Ced Leiva Dr. am filling in for Thomas Oseguera in our testosterone clinic and giving injections for the next few weeks. 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 I discussed self injection with him and he would like to do that. I can train him at his next appointment in 2 weeks on the proper way to give the IM testosterone injections if you are willing to send the prescription to his pharmacy along with needles and syringes. I would also be glad to see him as a patient if you feel that would be appropriate. Please let me know your thoughts.   Tim Mccauley

## 2023-04-26 ENCOUNTER — NURSE ONLY (OUTPATIENT)
Dept: ENDOCRINOLOGY | Age: 38
End: 2023-04-26
Payer: COMMERCIAL

## 2023-04-26 DIAGNOSIS — F41.9 ANXIETY: ICD-10-CM

## 2023-04-26 DIAGNOSIS — E29.1 TESTOSTERONE DEFICIENCY IN MALE: ICD-10-CM

## 2023-04-26 DIAGNOSIS — R79.89 LOW TESTOSTERONE: Primary | ICD-10-CM

## 2023-04-26 PROCEDURE — 96372 THER/PROPH/DIAG INJ SC/IM: CPT | Performed by: PHYSICIAN ASSISTANT

## 2023-04-26 RX ORDER — ESCITALOPRAM OXALATE 10 MG/1
10 TABLET ORAL DAILY
Qty: 30 TABLET | Refills: 3 | Status: SHIPPED | OUTPATIENT
Start: 2023-04-26

## 2023-04-26 RX ORDER — TESTOSTERONE CYPIONATE 200 MG/ML
200 INJECTION, SOLUTION INTRAMUSCULAR ONCE
Status: COMPLETED | OUTPATIENT
Start: 2023-04-26 | End: 2023-04-26

## 2023-04-26 RX ORDER — ESCITALOPRAM OXALATE 10 MG/1
TABLET ORAL
Qty: 30 TABLET | Refills: 3 | OUTPATIENT
Start: 2023-04-26

## 2023-04-26 RX ADMIN — TESTOSTERONE CYPIONATE 200 MG: 200 INJECTION, SOLUTION INTRAMUSCULAR at 14:46

## 2023-04-26 NOTE — TELEPHONE ENCOUNTER
PT calling stating he went for labs today PT wants to make sure he did all the labs that were ordered before his appt. Dr Stan Anderson.  Can you please let PT know [phone]

## 2023-04-26 NOTE — PROGRESS NOTES
Patient was shown the proper technique to withdraw testosterone from the vial, clean the rubber stopper prior to doing so, he then cleaned the area for the injection and self injected testosterone 100 mg IM left gluteal.  We will start giving himself these injections at home now. Patient tolerated injection well.     Administrations This Visit       testosterone cypionate (DEPOTESTOTERONE CYPIONATE) injection 200 mg       Admin Date  04/26/2023 Action  Given Dose  200 mg Route  IntraMUSCular Administered By  ELLE Miller

## 2023-04-26 NOTE — TELEPHONE ENCOUNTER
Comments:     Last Office Visit (last PCP visit):   11/8/2022    Next Visit Date:  Future Appointments   Date Time Provider Sudhir Anders   4/26/2023  2:30 PM SCHEDULE, [place]   5/8/2023 11:15 AM Bárbara Khoury MD [facility] [place]   5/10/2023  2:30 PM SCHEDULE, [facility] [place] [address]       **If hasn't been seen in over a year OR hasn't followed up according to last diabetes/ADHD visit, make appointment for patient before sending refill to provider.     Rx requested:  Requested Prescriptions     Pending Prescriptions Disp Refills    escitalopram (LEXAPRO) 10 MG tablet 30 tablet 3     Sig: Take 1 tablet by mouth daily

## 2023-04-27 DIAGNOSIS — E29.1 TESTOSTERONE DEFICIENCY IN MALE: Primary | ICD-10-CM

## 2023-04-27 LAB
SHBG SERPL-SCNC: 21 NMOL/L (ref 11–80)
TESTOST FREE SERPL-MCNC: 59 PG/ML (ref 47–244)
TESTOST SERPL-MCNC: 241 NG/DL (ref 220–1000)

## 2023-05-07 SDOH — ECONOMIC STABILITY: FOOD INSECURITY: WITHIN THE PAST 12 MONTHS, THE FOOD YOU BOUGHT JUST DIDN'T LAST AND YOU DIDN'T HAVE MONEY TO GET MORE.: NEVER TRUE

## 2023-05-07 SDOH — ECONOMIC STABILITY: TRANSPORTATION INSECURITY
IN THE PAST 12 MONTHS, HAS LACK OF TRANSPORTATION KEPT YOU FROM MEETINGS, WORK, OR FROM GETTING THINGS NEEDED FOR DAILY LIVING?: NO

## 2023-05-07 SDOH — ECONOMIC STABILITY: INCOME INSECURITY: HOW HARD IS IT FOR YOU TO PAY FOR THE VERY BASICS LIKE FOOD, HOUSING, MEDICAL CARE, AND HEATING?: NOT HARD AT ALL

## 2023-05-07 SDOH — ECONOMIC STABILITY: FOOD INSECURITY: WITHIN THE PAST 12 MONTHS, YOU WORRIED THAT YOUR FOOD WOULD RUN OUT BEFORE YOU GOT MONEY TO BUY MORE.: NEVER TRUE

## 2023-05-08 ENCOUNTER — OFFICE VISIT (OUTPATIENT)
Dept: FAMILY MEDICINE CLINIC | Age: 38
End: 2023-05-08
Payer: COMMERCIAL

## 2023-05-08 VITALS
DIASTOLIC BLOOD PRESSURE: 88 MMHG | SYSTOLIC BLOOD PRESSURE: 118 MMHG | HEART RATE: 88 BPM | HEIGHT: 68 IN | OXYGEN SATURATION: 99 % | BODY MASS INDEX: 39.71 KG/M2 | WEIGHT: 262 LBS | TEMPERATURE: 98.4 F

## 2023-05-08 DIAGNOSIS — I10 ESSENTIAL HYPERTENSION: Primary | ICD-10-CM

## 2023-05-08 DIAGNOSIS — R79.89 LOW TESTOSTERONE: ICD-10-CM

## 2023-05-08 DIAGNOSIS — M10.9 GOUTY ARTHRITIS OF LEFT GREAT TOE: ICD-10-CM

## 2023-05-08 DIAGNOSIS — R53.82 CHRONIC FATIGUE: ICD-10-CM

## 2023-05-08 PROCEDURE — G8417 CALC BMI ABV UP PARAM F/U: HCPCS | Performed by: FAMILY MEDICINE

## 2023-05-08 PROCEDURE — 3078F DIAST BP <80 MM HG: CPT | Performed by: FAMILY MEDICINE

## 2023-05-08 PROCEDURE — G8427 DOCREV CUR MEDS BY ELIG CLIN: HCPCS | Performed by: FAMILY MEDICINE

## 2023-05-08 PROCEDURE — 3074F SYST BP LT 130 MM HG: CPT | Performed by: FAMILY MEDICINE

## 2023-05-08 PROCEDURE — 1036F TOBACCO NON-USER: CPT | Performed by: FAMILY MEDICINE

## 2023-05-08 PROCEDURE — 99213 OFFICE O/P EST LOW 20 MIN: CPT | Performed by: FAMILY MEDICINE

## 2023-05-10 ENCOUNTER — NURSE ONLY (OUTPATIENT)
Dept: ENDOCRINOLOGY | Age: 38
End: 2023-05-10
Payer: COMMERCIAL

## 2023-05-10 DIAGNOSIS — R79.89 LOW TESTOSTERONE: Primary | ICD-10-CM

## 2023-05-10 PROCEDURE — 96372 THER/PROPH/DIAG INJ SC/IM: CPT | Performed by: INTERNAL MEDICINE

## 2023-05-10 RX ORDER — TESTOSTERONE CYPIONATE 200 MG/ML
100 INJECTION, SOLUTION INTRAMUSCULAR ONCE
Status: COMPLETED | OUTPATIENT
Start: 2023-05-10 | End: 2023-05-10

## 2023-05-10 RX ADMIN — TESTOSTERONE CYPIONATE 100 MG: 200 INJECTION, SOLUTION INTRAMUSCULAR at 15:18

## 2023-05-10 ASSESSMENT — ENCOUNTER SYMPTOMS
PHOTOPHOBIA: 0
CHEST TIGHTNESS: 0
SHORTNESS OF BREATH: 0
ABDOMINAL PAIN: 0
ABDOMINAL DISTENTION: 0

## 2023-05-10 NOTE — PROGRESS NOTES
Patient given Testosterone 100mg IM right gluteal..  Will return in 2weeks for next injection  Needs to recheck level in 1 week  Patient tolerated injection well.     Administrations This Visit       testosterone cypionate (DEPOTESTOTERONE CYPIONATE) injection 100 mg       Admin Date  05/10/2023 Action  Given Dose  100 mg Route  IntraMUSCular Administered By  Nancy Soni LPN

## 2023-05-17 DIAGNOSIS — E29.1 TESTOSTERONE DEFICIENCY IN MALE: ICD-10-CM

## 2023-05-17 LAB
SHBG SERPL-SCNC: 24 NMOL/L (ref 11–80)
TESTOST FREE SERPL-MCNC: 59.8 PG/ML (ref 47–244)
TESTOST SERPL-MCNC: 259 NG/DL (ref 220–1000)

## 2023-05-31 DIAGNOSIS — E29.1 TESTOSTERONE DEFICIENCY IN MALE: ICD-10-CM

## 2023-05-31 RX ORDER — TESTOSTERONE CYPIONATE 200 MG/ML
160 INJECTION, SOLUTION INTRAMUSCULAR
Qty: 10 ML | Refills: 1 | Status: SHIPPED | OUTPATIENT
Start: 2023-05-31 | End: 2024-05-31

## 2023-08-07 DIAGNOSIS — E29.1 TESTOSTERONE DEFICIENCY IN MALE: ICD-10-CM

## 2023-08-07 LAB
SHBG SERPL-SCNC: 26 NMOL/L (ref 11–80)
TESTOST FREE SERPL-MCNC: 268 PG/ML (ref 47–244)
TESTOST SERPL-MCNC: 989 NG/DL (ref 220–1000)

## 2023-08-09 ENCOUNTER — OFFICE VISIT (OUTPATIENT)
Dept: FAMILY MEDICINE CLINIC | Age: 38
End: 2023-08-09
Payer: COMMERCIAL

## 2023-08-09 VITALS
HEIGHT: 68 IN | OXYGEN SATURATION: 97 % | SYSTOLIC BLOOD PRESSURE: 130 MMHG | DIASTOLIC BLOOD PRESSURE: 98 MMHG | HEART RATE: 63 BPM | TEMPERATURE: 98.7 F | BODY MASS INDEX: 40.44 KG/M2 | WEIGHT: 266.8 LBS

## 2023-08-09 DIAGNOSIS — I10 UNCONTROLLED HYPERTENSION: ICD-10-CM

## 2023-08-09 DIAGNOSIS — R79.89 LOW TESTOSTERONE: Primary | ICD-10-CM

## 2023-08-09 DIAGNOSIS — E66.01 CLASS 3 SEVERE OBESITY DUE TO EXCESS CALORIES WITHOUT SERIOUS COMORBIDITY WITH BODY MASS INDEX (BMI) OF 40.0 TO 44.9 IN ADULT (HCC): ICD-10-CM

## 2023-08-09 PROBLEM — R10.10 PAIN OF UPPER ABDOMEN: Status: RESOLVED | Noted: 2019-11-17 | Resolved: 2023-08-09

## 2023-08-09 PROBLEM — M72.2 PLANTAR FASCIITIS: Status: RESOLVED | Noted: 2019-11-17 | Resolved: 2023-08-09

## 2023-08-09 PROBLEM — R07.81 RIB PAIN: Status: RESOLVED | Noted: 2019-11-17 | Resolved: 2023-08-09

## 2023-08-09 PROCEDURE — 1036F TOBACCO NON-USER: CPT | Performed by: FAMILY MEDICINE

## 2023-08-09 PROCEDURE — G8427 DOCREV CUR MEDS BY ELIG CLIN: HCPCS | Performed by: FAMILY MEDICINE

## 2023-08-09 PROCEDURE — 3080F DIAST BP >= 90 MM HG: CPT | Performed by: FAMILY MEDICINE

## 2023-08-09 PROCEDURE — 3075F SYST BP GE 130 - 139MM HG: CPT | Performed by: FAMILY MEDICINE

## 2023-08-09 PROCEDURE — G8417 CALC BMI ABV UP PARAM F/U: HCPCS | Performed by: FAMILY MEDICINE

## 2023-08-09 PROCEDURE — 99214 OFFICE O/P EST MOD 30 MIN: CPT | Performed by: FAMILY MEDICINE

## 2023-08-09 ASSESSMENT — ENCOUNTER SYMPTOMS
PHOTOPHOBIA: 0
ABDOMINAL PAIN: 0
ABDOMINAL DISTENTION: 0
CHEST TIGHTNESS: 0
SHORTNESS OF BREATH: 0

## 2023-08-09 NOTE — PROGRESS NOTES
assessment diagnosis listed for today with patient and any family/friend present. More than 50% of this visit was spent coordinating current care, obtaining information for prior records, and counseling for current plan of action. Assessment:       Diagnosis Orders   1. Low testosterone        2. Uncontrolled hypertension        3. Class 3 severe obesity due to excess calories without serious comorbidity with body mass index (BMI) of 40.0 to 44.9 in adult [facility])              No orders of the defined types were placed in this encounter. No orders of the defined types were placed in this encounter. Medication List            Accurate as of August 9, 2023  1:45 PM. If you have any questions, ask your nurse or doctor. CONTINUE taking these medications      allopurinol 100 MG tablet  Commonly known as: ZYLOPRIM  Take 1 tablet by mouth 2 times daily     escitalopram 10 MG tablet  Commonly known as: LEXAPRO  Take 1 tablet by mouth daily     losartan-hydroCHLOROthiazide 100-12.5 MG per tablet  Commonly known as: HYZAAR  Take 1 tablet by mouth daily     pantoprazole 40 MG tablet  Commonly known as: Protonix  Take 1 tablet by mouth daily as needed (acid reflux)     testosterone cypionate 200 MG/ML injection  Commonly known as: DEPOTESTOTERONE CYPIONATE  Inject 0.8 mLs into the muscle every 14 days. Max Daily Amount: 160 mg     therapeutic multivitamin-minerals tablet                Plan:   Return in about 2 weeks (around 8/23/2023) for for review of outcome of today's recommendation. Patient Instructions   Pt now has proper equipment for proper withdrawal of testosterone. Will resume prescribed dosing. Recheck blood pressure in 2 weeks. Recommended to increase physical activity and decrease alcohol intake  This note was partially created with the assistance of dictation. This may lead to grammatical or spelling errors. Dionisio Kay M.D.

## 2023-08-09 NOTE — PATIENT INSTRUCTIONS
Pt now has proper equipment for proper withdrawal of testosterone. Will resume prescribed dosing. Recheck blood pressure in 2 weeks.   Recommended to increase physical activity and decrease alcohol intake

## 2023-09-07 ENCOUNTER — OFFICE VISIT (OUTPATIENT)
Dept: FAMILY MEDICINE CLINIC | Age: 38
End: 2023-09-07
Payer: COMMERCIAL

## 2023-09-07 VITALS
HEART RATE: 74 BPM | OXYGEN SATURATION: 98 % | HEIGHT: 68 IN | WEIGHT: 266 LBS | BODY MASS INDEX: 40.32 KG/M2 | DIASTOLIC BLOOD PRESSURE: 98 MMHG | TEMPERATURE: 98.8 F | SYSTOLIC BLOOD PRESSURE: 138 MMHG

## 2023-09-07 DIAGNOSIS — I10 UNCONTROLLED HYPERTENSION: Primary | ICD-10-CM

## 2023-09-07 PROCEDURE — 99214 OFFICE O/P EST MOD 30 MIN: CPT | Performed by: FAMILY MEDICINE

## 2023-09-07 PROCEDURE — 1036F TOBACCO NON-USER: CPT | Performed by: FAMILY MEDICINE

## 2023-09-07 PROCEDURE — G8417 CALC BMI ABV UP PARAM F/U: HCPCS | Performed by: FAMILY MEDICINE

## 2023-09-07 PROCEDURE — 3075F SYST BP GE 130 - 139MM HG: CPT | Performed by: FAMILY MEDICINE

## 2023-09-07 PROCEDURE — G8427 DOCREV CUR MEDS BY ELIG CLIN: HCPCS | Performed by: FAMILY MEDICINE

## 2023-09-07 PROCEDURE — 3080F DIAST BP >= 90 MM HG: CPT | Performed by: FAMILY MEDICINE

## 2023-09-07 RX ORDER — CLONIDINE HYDROCHLORIDE 0.1 MG/1
0.1 TABLET ORAL 2 TIMES DAILY
Qty: 60 TABLET | Refills: 3 | Status: SHIPPED | OUTPATIENT
Start: 2023-09-07

## 2023-09-07 ASSESSMENT — ENCOUNTER SYMPTOMS
ABDOMINAL PAIN: 0
CHEST TIGHTNESS: 0
PHOTOPHOBIA: 0
ABDOMINAL DISTENTION: 0
SHORTNESS OF BREATH: 0

## 2023-09-07 NOTE — PATIENT INSTRUCTIONS
Adding clonidine for bp control. May consider d/C hydrchlorothiazide if clonidine works well. Patient will monitor blood pressures at home. Bring monitor to next appointment to check for accuracy.     If patient has not noted decrease of 30 points or more for his blood pressure he may skip doses of the clonidine if needed

## 2023-09-07 NOTE — PROGRESS NOTES
Diagnosis Orders   1. Uncontrolled hypertension  cloNIDine (CATAPRES) 0.1 MG tablet        Return in about 2 weeks (around 9/21/2023) for for review of outcome of today's recommendation. Patient Instructions   Adding clonidine for bp control. May consider d/C hydrchlorothiazide if clonidine works well. Patient will monitor blood pressures at home. Bring monitor to next appointment to check for accuracy. If patient has not noted decrease of 30 points or more for his blood pressure he may skip doses of the clonidine if needed    Subjective:      Patient ID: Leanna Blank is a 30 y.o. male who presents for:  Chief Complaint   Patient presents with    Blood Pressure Check       States he has been cutting back alcohol but is still drinking. Is about to be getting much more active because he has fall baseball and hockey starting. No trouble with medications. Denies cardiovascular symptoms. Current Outpatient Medications on File Prior to Visit   Medication Sig Dispense Refill    testosterone cypionate (DEPOTESTOTERONE CYPIONATE) 200 MG/ML injection Inject 0.8 mLs into the muscle every 14 days. Max Daily Amount: 160 mg 10 mL 1    escitalopram (LEXAPRO) 10 MG tablet Take 1 tablet by mouth daily 30 tablet 3    losartan-hydroCHLOROthiazide (HYZAAR) 100-12.5 MG per tablet Take 1 tablet by mouth daily 30 tablet 5    allopurinol (ZYLOPRIM) 100 MG tablet Take 1 tablet by mouth 2 times daily 60 tablet 5    pantoprazole (PROTONIX) 40 MG tablet Take 1 tablet by mouth daily as needed (acid reflux) 30 tablet 5    Multiple Vitamins-Minerals (THERAPEUTIC MULTIVITAMIN-MINERALS) tablet Take 1 tablet by mouth daily       No current facility-administered medications on file prior to visit. Past Medical History:   Diagnosis Date    Anxiety     Hypertension     Obesity     Scoliosis      History reviewed. No pertinent surgical history.   Social History     Socioeconomic History    Marital status: Single     Spouse

## 2023-09-08 DIAGNOSIS — I10 ESSENTIAL HYPERTENSION: ICD-10-CM

## 2023-09-08 RX ORDER — LOSARTAN POTASSIUM AND HYDROCHLOROTHIAZIDE 12.5; 1 MG/1; MG/1
TABLET ORAL
Qty: 30 TABLET | Refills: 5 | Status: SHIPPED | OUTPATIENT
Start: 2023-09-08

## 2023-09-08 NOTE — TELEPHONE ENCOUNTER
Comments:     Last Office Visit (last PCP visit):   9/7/2023    Next Visit Date:  Future Appointments   Date Time Provider [address]   9/21/2023  1:15 PM Pratibha Ray MD [facility] [place]       **If hasn't been seen in over a year OR hasn't followed up according to last diabetes/ADHD visit, make appointment for patient before sending refill to provider.     Rx requested:  Requested Prescriptions     Pending Prescriptions Disp Refills    losartan-hydroCHLOROthiazide (HYZAAR) 100-12.5 MG per tablet [Pharmacy Med Name: losartan 100 mg-hydrochlorothiazide 12.5 mg tablet] 30 tablet 5     Sig: TAKE 1 TABLET BY MOUTH ONCE DAILY

## 2023-09-21 ENCOUNTER — OFFICE VISIT (OUTPATIENT)
Dept: FAMILY MEDICINE CLINIC | Age: 38
End: 2023-09-21
Payer: COMMERCIAL

## 2023-09-21 VITALS
SYSTOLIC BLOOD PRESSURE: 124 MMHG | TEMPERATURE: 98.8 F | WEIGHT: 266 LBS | DIASTOLIC BLOOD PRESSURE: 92 MMHG | OXYGEN SATURATION: 98 % | BODY MASS INDEX: 40.32 KG/M2 | HEIGHT: 68 IN | HEART RATE: 100 BPM

## 2023-09-21 DIAGNOSIS — R79.89 LOW TESTOSTERONE: ICD-10-CM

## 2023-09-21 DIAGNOSIS — F41.9 ANXIETY: ICD-10-CM

## 2023-09-21 DIAGNOSIS — I10 UNCONTROLLED HYPERTENSION: Primary | ICD-10-CM

## 2023-09-21 DIAGNOSIS — E66.01 CLASS 3 SEVERE OBESITY DUE TO EXCESS CALORIES WITHOUT SERIOUS COMORBIDITY WITH BODY MASS INDEX (BMI) OF 40.0 TO 44.9 IN ADULT (HCC): ICD-10-CM

## 2023-09-21 PROCEDURE — G8427 DOCREV CUR MEDS BY ELIG CLIN: HCPCS | Performed by: FAMILY MEDICINE

## 2023-09-21 PROCEDURE — G8417 CALC BMI ABV UP PARAM F/U: HCPCS | Performed by: FAMILY MEDICINE

## 2023-09-21 PROCEDURE — 3080F DIAST BP >= 90 MM HG: CPT | Performed by: FAMILY MEDICINE

## 2023-09-21 PROCEDURE — 99214 OFFICE O/P EST MOD 30 MIN: CPT | Performed by: FAMILY MEDICINE

## 2023-09-21 PROCEDURE — 1036F TOBACCO NON-USER: CPT | Performed by: FAMILY MEDICINE

## 2023-09-21 PROCEDURE — 3074F SYST BP LT 130 MM HG: CPT | Performed by: FAMILY MEDICINE

## 2023-09-21 RX ORDER — CLONIDINE HYDROCHLORIDE 0.2 MG/1
0.2 TABLET ORAL 2 TIMES DAILY
Qty: 60 TABLET | Refills: 0 | Status: SHIPPED | OUTPATIENT
Start: 2023-09-21

## 2023-09-21 ASSESSMENT — ENCOUNTER SYMPTOMS
PHOTOPHOBIA: 0
ABDOMINAL DISTENTION: 0
CHEST TIGHTNESS: 0
SHORTNESS OF BREATH: 0
ABDOMINAL PAIN: 0

## 2023-09-21 NOTE — PROGRESS NOTES
Diagnosis Orders   1. Uncontrolled hypertension  cloNIDine (CATAPRES) 0.2 MG tablet      2. Anxiety        3. Low testosterone        4. Class 3 severe obesity due to excess calories without serious comorbidity with body mass index (BMI) of 40.0 to 44.9 in adult [facility])          Return in about 2 weeks (around 10/5/2023) for for review of outcome of today's recommendation. Patient Instructions   Genesight testing to be done today     Clonidine 2 tabs twice daily of 0.1 mg tablet until f/u        Subjective:      Patient ID: Maame Antoine is a 30 y.o. male who presents for:  Chief Complaint   Patient presents with    Hypertension     X 2 week follow up     Discuss Medications     Pt states that he his feeling not so anxious, or angry. States between adding the clonidine and using a lower testosterone dose he feels better. Blood pressure at home best measurement was 134/86. However he still feels very anxious. Not sure the Lexapro is doing the job anymore. The clonidine helped him feel little bit less anxious than previously, but still not under control. Current Outpatient Medications on File Prior to Visit   Medication Sig Dispense Refill    losartan-hydroCHLOROthiazide (HYZAAR) 100-12.5 MG per tablet TAKE 1 TABLET BY MOUTH ONCE DAILY 30 tablet 5    testosterone cypionate (DEPOTESTOTERONE CYPIONATE) 200 MG/ML injection Inject 0.8 mLs into the muscle every 14 days. Max Daily Amount: 160 mg 10 mL 1    escitalopram (LEXAPRO) 10 MG tablet Take 1 tablet by mouth daily 30 tablet 3    allopurinol (ZYLOPRIM) 100 MG tablet Take 1 tablet by mouth 2 times daily 60 tablet 5    pantoprazole (PROTONIX) 40 MG tablet Take 1 tablet by mouth daily as needed (acid reflux) 30 tablet 5    Multiple Vitamins-Minerals (THERAPEUTIC MULTIVITAMIN-MINERALS) tablet Take 1 tablet by mouth daily       No current facility-administered medications on file prior to visit.      Past Medical History:   Diagnosis Date    Anxiety

## 2023-10-05 ENCOUNTER — OFFICE VISIT (OUTPATIENT)
Dept: FAMILY MEDICINE CLINIC | Age: 38
End: 2023-10-05
Payer: COMMERCIAL

## 2023-10-05 VITALS
OXYGEN SATURATION: 98 % | DIASTOLIC BLOOD PRESSURE: 82 MMHG | HEIGHT: 68 IN | TEMPERATURE: 98.7 F | SYSTOLIC BLOOD PRESSURE: 124 MMHG | BODY MASS INDEX: 40.32 KG/M2 | WEIGHT: 266 LBS | HEART RATE: 76 BPM

## 2023-10-05 DIAGNOSIS — Z15.89 HETEROZYGOUS MTHFR MUTATION C677T: ICD-10-CM

## 2023-10-05 DIAGNOSIS — G47.09 OTHER INSOMNIA: Primary | ICD-10-CM

## 2023-10-05 DIAGNOSIS — F41.9 ANXIETY: ICD-10-CM

## 2023-10-05 PROCEDURE — G8417 CALC BMI ABV UP PARAM F/U: HCPCS | Performed by: FAMILY MEDICINE

## 2023-10-05 PROCEDURE — 3074F SYST BP LT 130 MM HG: CPT | Performed by: FAMILY MEDICINE

## 2023-10-05 PROCEDURE — G8427 DOCREV CUR MEDS BY ELIG CLIN: HCPCS | Performed by: FAMILY MEDICINE

## 2023-10-05 PROCEDURE — 1036F TOBACCO NON-USER: CPT | Performed by: FAMILY MEDICINE

## 2023-10-05 PROCEDURE — G8484 FLU IMMUNIZE NO ADMIN: HCPCS | Performed by: FAMILY MEDICINE

## 2023-10-05 PROCEDURE — 99214 OFFICE O/P EST MOD 30 MIN: CPT | Performed by: FAMILY MEDICINE

## 2023-10-05 PROCEDURE — 3079F DIAST BP 80-89 MM HG: CPT | Performed by: FAMILY MEDICINE

## 2023-10-05 RX ORDER — VILAZODONE HYDROCHLORIDE 10 MG/1
10 TABLET ORAL DAILY
Qty: 7 TABLET | Refills: 0 | Status: SHIPPED | OUTPATIENT
Start: 2023-10-05 | End: 2023-10-12

## 2023-10-05 RX ORDER — VILAZODONE HYDROCHLORIDE 20 MG/1
20 TABLET ORAL DAILY
Qty: 30 TABLET | Refills: 2 | Status: SHIPPED | OUTPATIENT
Start: 2023-10-13

## 2023-10-05 RX ORDER — TRAZODONE HYDROCHLORIDE 50 MG/1
TABLET ORAL
Qty: 60 TABLET | Refills: 1 | Status: SHIPPED | OUTPATIENT
Start: 2023-10-05

## 2023-10-05 RX ORDER — LEVOMEFOLATE CALCIUM 7.5 MG TABLET
7.5 TABLET DAILY
Qty: 30 TABLET | Refills: 11 | Status: SHIPPED | OUTPATIENT
Start: 2023-10-05

## 2023-10-05 NOTE — PATIENT INSTRUCTIONS
Patient will initiate 7.5 mg of L-methylfolate for his new diagnosis of MTHFR deficiency.     Going to work on switching patient over to [facility] test was reviewed and demonstrated many medications with lack of efficacy for patient's treatment and patient has failed Lexapro    Trazodone will be initiated 25 to 100 mg as needed for sleep until medication switch is complete and then at that time his sleep should improve

## 2023-10-05 NOTE — PROGRESS NOTES
[facility]   Diagnosis Orders   1. Other insomnia  traZODone (DESYREL) 50 MG tablet      2. Heterozygous MTHFR mutation [id number]  methylfolate (DEPLIN) 7.5 MG TABS tablet      3. Anxiety  traZODone (DESYREL) 50 MG tablet    vilazodone hcl 10 MG TABS    vilazodone HCl (VIIBRYD) 20 MG TABS        Return in about 4 weeks (around 11/2/2023) for for review of outcome of today's recommendation. Patient Instructions   Patient will initiate 7.5 mg of L-methylfolate for his new diagnosis of MTHFR deficiency. Going to work on switching patient over to [facility] test was reviewed and demonstrated many medications with lack of efficacy for patient's treatment and patient has failed Lexapro    Trazodone will be initiated 25 to 100 mg as needed for sleep until medication switch is complete and then at that time his sleep should improve    Subjective:      Patient ID: Chen Scott is a 30 y.o. male who presents for:  Chief Complaint   Patient presents with    Results     GENESIGHT       Pt noting early morning awakening. Goes to bed at 10 and wakes by 1 and cant fall back asleep for 2-3 hours    Feeling a little less symptomatic mood with clonidine. [facility]        Current Outpatient Medications on File Prior to Visit   Medication Sig Dispense Refill    cloNIDine (CATAPRES) 0.2 MG tablet Take 1 tablet by mouth 2 times daily 60 tablet 0    losartan-hydroCHLOROthiazide (HYZAAR) 100-12.5 MG per tablet TAKE 1 TABLET BY MOUTH ONCE DAILY 30 tablet 5    testosterone cypionate (DEPOTESTOTERONE CYPIONATE) 200 MG/ML injection Inject 0.8 mLs into the muscle every 14 days.  Max Daily Amount: 160 mg 10 mL 1    allopurinol (ZYLOPRIM) 100 MG tablet Take 1 tablet by mouth 2 times daily 60 tablet 5    pantoprazole (PROTONIX) 40 MG tablet Take 1 tablet by mouth daily as needed (acid reflux) 30 tablet 5    Multiple Vitamins-Minerals (THERAPEUTIC MULTIVITAMIN-MINERALS) tablet Take 1 tablet by mouth daily       No current

## 2023-11-02 ENCOUNTER — OFFICE VISIT (OUTPATIENT)
Dept: FAMILY MEDICINE CLINIC | Age: 38
End: 2023-11-02
Payer: COMMERCIAL

## 2023-11-02 VITALS
HEIGHT: 68 IN | TEMPERATURE: 98.7 F | BODY MASS INDEX: 40.32 KG/M2 | WEIGHT: 266 LBS | SYSTOLIC BLOOD PRESSURE: 120 MMHG | DIASTOLIC BLOOD PRESSURE: 72 MMHG

## 2023-11-02 DIAGNOSIS — F41.9 ANXIETY: ICD-10-CM

## 2023-11-02 DIAGNOSIS — G47.09 OTHER INSOMNIA: ICD-10-CM

## 2023-11-02 DIAGNOSIS — Z15.89 HETEROZYGOUS MTHFR MUTATION C677T: ICD-10-CM

## 2023-11-02 DIAGNOSIS — I10 ESSENTIAL HYPERTENSION: Primary | ICD-10-CM

## 2023-11-02 PROCEDURE — G8484 FLU IMMUNIZE NO ADMIN: HCPCS | Performed by: FAMILY MEDICINE

## 2023-11-02 PROCEDURE — 1036F TOBACCO NON-USER: CPT | Performed by: FAMILY MEDICINE

## 2023-11-02 PROCEDURE — 3078F DIAST BP <80 MM HG: CPT | Performed by: FAMILY MEDICINE

## 2023-11-02 PROCEDURE — 99213 OFFICE O/P EST LOW 20 MIN: CPT | Performed by: FAMILY MEDICINE

## 2023-11-02 PROCEDURE — G8427 DOCREV CUR MEDS BY ELIG CLIN: HCPCS | Performed by: FAMILY MEDICINE

## 2023-11-02 PROCEDURE — 3074F SYST BP LT 130 MM HG: CPT | Performed by: FAMILY MEDICINE

## 2023-11-02 PROCEDURE — G8417 CALC BMI ABV UP PARAM F/U: HCPCS | Performed by: FAMILY MEDICINE

## 2023-11-02 ASSESSMENT — ENCOUNTER SYMPTOMS
ABDOMINAL PAIN: 0
CHEST TIGHTNESS: 0
ABDOMINAL DISTENTION: 0
SHORTNESS OF BREATH: 0
PHOTOPHOBIA: 0

## 2023-11-02 NOTE — PROGRESS NOTES
bedtime for insomnia     * vilazodone HCl 10 MG Tabs  Commonly known as: vilazodone hcl  Take 1 tablet by mouth daily for 7 days     * vilazodone HCl 20 MG Tabs  Commonly known as: VIIBRYD  Take 1 tablet by mouth daily           * This list has 2 medication(s) that are the same as other medications prescribed for you. Read the directions carefully, and ask your doctor or other care provider to review them with you. Plan:   Return in about 4 weeks (around 11/30/2023) for for review of outcome of today's recommendation. Patient Instructions   Patient will continue trazodone as needed for sleep. Patient will stay on Vilazodone patient will continue trazodone as needed for sleep. Patient will stay on Vilazodone 20 mg daily. Patient will continue current losartan and clonidine doses. Follow-up 4 weeks. This note was partially created with the assistance of dictation. This may lead to grammatical or spelling errors. Michele L. Lacey Dandy, M.D.

## 2023-11-02 NOTE — PATIENT INSTRUCTIONS
Patient will continue trazodone as needed for sleep. Patient will stay on Vilazodone patient will continue trazodone as needed for sleep. Patient will stay on Vilazodone 20 mg daily. Patient will continue current losartan and clonidine doses. Follow-up 4 weeks.

## 2023-11-13 DIAGNOSIS — I10 UNCONTROLLED HYPERTENSION: ICD-10-CM

## 2023-11-15 RX ORDER — CLONIDINE HYDROCHLORIDE 0.2 MG/1
0.2 TABLET ORAL 2 TIMES DAILY
Qty: 60 TABLET | Refills: 0 | Status: SHIPPED | OUTPATIENT
Start: 2023-11-15 | End: 2023-11-30 | Stop reason: SDUPTHER

## 2023-11-30 ENCOUNTER — OFFICE VISIT (OUTPATIENT)
Dept: FAMILY MEDICINE CLINIC | Age: 38
End: 2023-11-30
Payer: COMMERCIAL

## 2023-11-30 VITALS
BODY MASS INDEX: 40.62 KG/M2 | HEART RATE: 74 BPM | DIASTOLIC BLOOD PRESSURE: 84 MMHG | WEIGHT: 268 LBS | OXYGEN SATURATION: 98 % | SYSTOLIC BLOOD PRESSURE: 138 MMHG | TEMPERATURE: 98.7 F | HEIGHT: 68 IN

## 2023-11-30 DIAGNOSIS — I10 UNCONTROLLED HYPERTENSION: ICD-10-CM

## 2023-11-30 DIAGNOSIS — E29.1 TESTOSTERONE DEFICIENCY IN MALE: Primary | ICD-10-CM

## 2023-11-30 DIAGNOSIS — Z15.89 HETEROZYGOUS MTHFR MUTATION C677T: ICD-10-CM

## 2023-11-30 DIAGNOSIS — F41.9 ANXIETY: ICD-10-CM

## 2023-11-30 DIAGNOSIS — G47.09 OTHER INSOMNIA: ICD-10-CM

## 2023-11-30 PROCEDURE — 99214 OFFICE O/P EST MOD 30 MIN: CPT | Performed by: FAMILY MEDICINE

## 2023-11-30 PROCEDURE — G8427 DOCREV CUR MEDS BY ELIG CLIN: HCPCS | Performed by: FAMILY MEDICINE

## 2023-11-30 PROCEDURE — 1036F TOBACCO NON-USER: CPT | Performed by: FAMILY MEDICINE

## 2023-11-30 PROCEDURE — G8484 FLU IMMUNIZE NO ADMIN: HCPCS | Performed by: FAMILY MEDICINE

## 2023-11-30 PROCEDURE — 3079F DIAST BP 80-89 MM HG: CPT | Performed by: FAMILY MEDICINE

## 2023-11-30 PROCEDURE — 3075F SYST BP GE 130 - 139MM HG: CPT | Performed by: FAMILY MEDICINE

## 2023-11-30 PROCEDURE — G8417 CALC BMI ABV UP PARAM F/U: HCPCS | Performed by: FAMILY MEDICINE

## 2023-11-30 RX ORDER — CLONIDINE HYDROCHLORIDE 0.2 MG/1
0.2 TABLET ORAL 2 TIMES DAILY
Qty: 60 TABLET | Refills: 3 | Status: SHIPPED | OUTPATIENT
Start: 2023-11-30

## 2023-11-30 RX ORDER — TESTOSTERONE CYPIONATE 200 MG/ML
80 INJECTION, SOLUTION INTRAMUSCULAR WEEKLY
Qty: 1 ML | Refills: 1 | Status: SHIPPED
Start: 2023-11-30 | End: 2024-11-30

## 2023-11-30 ASSESSMENT — ENCOUNTER SYMPTOMS
SHORTNESS OF BREATH: 0
ABDOMINAL DISTENTION: 0
PHOTOPHOBIA: 0
CHEST TIGHTNESS: 0
ABDOMINAL PAIN: 0

## 2023-11-30 NOTE — PROGRESS NOTES
Diagnosis Orders   1. Testosterone deficiency in male  testosterone cypionate (DEPOTESTOTERONE CYPIONATE) 200 MG/ML injection      2. Uncontrolled hypertension  cloNIDine (CATAPRES) 0.2 MG tablet      3. Anxiety        4. Heterozygous MTHFR mutation C677T        5. Other insomnia          Return in about 3 months (around 2/29/2024) for for routine major medical condition management. Patient Instructions   Patient will be picking up L-methylfolate's vitamin. Patient would like to continue Viibryd at 20 mg refills done. Patient advised to only use trazodone intermittently as it is similar agent to Vilazodone. Patient is going to continue current blood pressure medications and refills have been provided. We are going to decrease the patient's injections in milligram for testosterone but increased frequency. We are going to change his injection to 80 mg weekly. And see if this helps how he feels. Subjective:      Patient ID: Cristiano Dugan is a 41 y.o. male who presents for:  Chief Complaint   Patient presents with    Discuss Medications       Patient states he has discontinued his last couple testosterone injections because he felt more aggressive on it. Wondering how he should adjust his dose. Happy to see his blood pressure measurement is not elevated at this moment since he actually missed a number of doses of his blood pressure medication being out of town over the weekend. Feels Vilazodone is working very well and has rarely needed trazodone since starting it. Is not working on diet and activity at this time but did do some workouts.         Current Outpatient Medications on File Prior to Visit   Medication Sig Dispense Refill    traZODone (DESYREL) 50 MG tablet 1/2-2 tabs at bedtime for insomnia 60 tablet 1    vilazodone HCl (VIIBRYD) 20 MG TABS Take 1 tablet by mouth daily 30 tablet 2    losartan-hydroCHLOROthiazide (HYZAAR) 100-12.5 MG per tablet TAKE 1 TABLET BY MOUTH ONCE

## 2023-11-30 NOTE — PATIENT INSTRUCTIONS
Patient will be picking up L-methylfolate's vitamin. Patient would like to continue Viibryd at 20 mg refills done. Patient advised to only use trazodone intermittently as it is similar agent to Vilazodone. Patient is going to continue current blood pressure medications and refills have been provided. We are going to decrease the patient's injections in milligram for testosterone but increased frequency. We are going to change his injection to 80 mg weekly. And see if this helps how he feels.

## 2024-01-03 DIAGNOSIS — M10.9 GOUTY ARTHRITIS OF LEFT GREAT TOE: ICD-10-CM

## 2024-01-03 DIAGNOSIS — I10 UNCONTROLLED HYPERTENSION: ICD-10-CM

## 2024-01-03 RX ORDER — ALLOPURINOL 100 MG/1
100 TABLET ORAL 2 TIMES DAILY
Qty: 60 TABLET | Refills: 5 | Status: SHIPPED | OUTPATIENT
Start: 2024-01-03

## 2024-01-03 RX ORDER — CLONIDINE HYDROCHLORIDE 0.2 MG/1
0.2 TABLET ORAL 2 TIMES DAILY
Qty: 60 TABLET | Refills: 5 | Status: SHIPPED | OUTPATIENT
Start: 2024-01-03

## 2024-01-03 NOTE — TELEPHONE ENCOUNTER
Comments: duplicate    Last Office Visit (last PCP visit):   11/30/2023    Next Visit Date:  Future Appointments   Date Time Provider Department Center   3/18/2024  5:45 PM Dionisio Kwong MD [facility] Lydia Cabrrea       **If hasn't been seen in over a year OR hasn't followed up according to last diabetes/ADHD visit, make appointment for patient before sending refill to provider.    Rx requested:  Requested Prescriptions     Pending Prescriptions Disp Refills    allopurinol (ZYLOPRIM) 100 MG tablet [Pharmacy Med Name: allopurinol 100 mg tablet] 60 tablet 5     Sig: TAKE 1 TABLET BY MOUTH TWICE DAILY    cloNIDine (CATAPRES) 0.2 MG tablet [Pharmacy Med Name: clonidine HCl 0.2 mg tablet] 60 tablet 5     Sig: TAKE 1 TABLET BY MOUTH TWICE DAILY

## 2024-01-22 DIAGNOSIS — F41.9 ANXIETY: ICD-10-CM

## 2024-01-22 RX ORDER — VILAZODONE HYDROCHLORIDE 20 MG/1
20 TABLET ORAL DAILY
Qty: 30 TABLET | Refills: 2 | Status: SHIPPED | OUTPATIENT
Start: 2024-01-22

## 2024-01-22 NOTE — TELEPHONE ENCOUNTER
Comments:     Last Office Visit (last PCP visit):   11/30/2023    Next Visit Date:  Future Appointments   Date Time Provider Department Center   3/18/2024  5:45 PM Dionisio Kwong MD [facility] Lydia Cabrera       **If hasn't been seen in over a year OR hasn't followed up according to last diabetes/ADHD visit, make appointment for patient before sending refill to provider.    Rx requested:  Requested Prescriptions     Pending Prescriptions Disp Refills    vilazodone HCl (VIIBRYD) 20 MG TABS [Pharmacy Med Name: vilazodone 20 mg tablet] 30 tablet 2     Sig: TAKE 1 TABLET BY MOUTH ONCE DAILY

## 2024-02-12 ENCOUNTER — OFFICE VISIT (OUTPATIENT)
Dept: PRIMARY CARE CLINIC | Age: 39
End: 2024-02-12
Payer: COMMERCIAL

## 2024-02-12 VITALS
HEART RATE: 106 BPM | OXYGEN SATURATION: 99 % | SYSTOLIC BLOOD PRESSURE: 126 MMHG | BODY MASS INDEX: 39.62 KG/M2 | WEIGHT: 261.4 LBS | DIASTOLIC BLOOD PRESSURE: 84 MMHG | HEIGHT: 68 IN

## 2024-02-12 DIAGNOSIS — S69.92XA FINGER INJURY, LEFT, INITIAL ENCOUNTER: ICD-10-CM

## 2024-02-12 DIAGNOSIS — M54.6 THORACIC SPINE PAIN: Primary | ICD-10-CM

## 2024-02-12 PROCEDURE — 3074F SYST BP LT 130 MM HG: CPT | Performed by: STUDENT IN AN ORGANIZED HEALTH CARE EDUCATION/TRAINING PROGRAM

## 2024-02-12 PROCEDURE — G8417 CALC BMI ABV UP PARAM F/U: HCPCS | Performed by: STUDENT IN AN ORGANIZED HEALTH CARE EDUCATION/TRAINING PROGRAM

## 2024-02-12 PROCEDURE — 99214 OFFICE O/P EST MOD 30 MIN: CPT | Performed by: STUDENT IN AN ORGANIZED HEALTH CARE EDUCATION/TRAINING PROGRAM

## 2024-02-12 PROCEDURE — 3079F DIAST BP 80-89 MM HG: CPT | Performed by: STUDENT IN AN ORGANIZED HEALTH CARE EDUCATION/TRAINING PROGRAM

## 2024-02-12 PROCEDURE — G8427 DOCREV CUR MEDS BY ELIG CLIN: HCPCS | Performed by: STUDENT IN AN ORGANIZED HEALTH CARE EDUCATION/TRAINING PROGRAM

## 2024-02-12 PROCEDURE — 1036F TOBACCO NON-USER: CPT | Performed by: STUDENT IN AN ORGANIZED HEALTH CARE EDUCATION/TRAINING PROGRAM

## 2024-02-12 PROCEDURE — G8484 FLU IMMUNIZE NO ADMIN: HCPCS | Performed by: STUDENT IN AN ORGANIZED HEALTH CARE EDUCATION/TRAINING PROGRAM

## 2024-02-12 PROCEDURE — 96372 THER/PROPH/DIAG INJ SC/IM: CPT | Performed by: STUDENT IN AN ORGANIZED HEALTH CARE EDUCATION/TRAINING PROGRAM

## 2024-02-12 RX ORDER — KETOROLAC TROMETHAMINE 30 MG/ML
60 INJECTION, SOLUTION INTRAMUSCULAR; INTRAVENOUS ONCE
Status: COMPLETED | OUTPATIENT
Start: 2024-02-12 | End: 2024-02-12

## 2024-02-12 RX ADMIN — KETOROLAC TROMETHAMINE 60 MG: 30 INJECTION, SOLUTION INTRAMUSCULAR; INTRAVENOUS at 12:44

## 2024-02-12 NOTE — PROGRESS NOTES
2/12/2024        Kenney LEÓN Hrivnak 1985 is a 38 y.o. male who presents today with:  Chief Complaint   Patient presents with    Back Pain     5 days in the middle of back     Hand Injury     Finger injury 10 days ago not healing        HPI:   Cramping back pain for 3 days. Feels in center of back and is 8-9/10, feels sharp as well, stays central with slight radiation to sides. PMHx scoliosis, work in construction. He took IBU 1000 last night without relief.     Left index finger was injured with an open wound, 10 days ago.  He states he did not seek treatment and it has scabbed over but the tissue has not healed correctly.  He denies any pain or pus.    Assessment & Plan   1. Thoracic spine pain  -     ketorolac (TORADOL) injection 60 mg; 60 mg, IntraMUSCular, ONCE, 1 dose, On Mon 2/12/24 at 1300Do not administer for more than 5 days.  2. Finger injury, left, initial encounter     There are no discontinued medications.  No follow-ups on file.    Toradol shot today, advised to only use Tylenol 1000 mg 3 times daily for the next 24 hours, then he can go back to using ibuprofen.    Objective  No Known Allergies  Current Outpatient Medications   Medication Sig Dispense Refill    allopurinol (ZYLOPRIM) 100 MG tablet TAKE 1 TABLET BY MOUTH TWICE DAILY 60 tablet 5    cloNIDine (CATAPRES) 0.2 MG tablet TAKE 1 TABLET BY MOUTH TWICE DAILY 60 tablet 5    testosterone cypionate (DEPOTESTOTERONE CYPIONATE) 200 MG/ML injection Inject 0.4 mLs into the muscle once a week. Max Daily Amount: 80 mg 1 mL 1    losartan-hydroCHLOROthiazide (HYZAAR) 100-12.5 MG per tablet TAKE 1 TABLET BY MOUTH ONCE DAILY 30 tablet 5    pantoprazole (PROTONIX) 40 MG tablet Take 1 tablet by mouth daily as needed (acid reflux) 30 tablet 5    Multiple Vitamins-Minerals (THERAPEUTIC MULTIVITAMIN-MINERALS) tablet Take 1 tablet by mouth daily      vilazodone HCl (VIIBRYD) 20 MG TABS TAKE 1 TABLET BY MOUTH ONCE DAILY (Patient not taking: Reported on

## 2024-02-18 DIAGNOSIS — E29.1 TESTOSTERONE DEFICIENCY IN MALE: ICD-10-CM

## 2024-02-19 NOTE — TELEPHONE ENCOUNTER
Future Appointments    Encounter Information   Provider Department Appt Notes   3/25/2024 Dionisio Kwong MD [facility] Primary Care Follow UP     Past Visits    Date Provider Specialty Visit Type Primary Dx   02/12/2024 Ramona Bangura MD Primary Care Office Visit Thoracic spine pain   11/30/2023 Dionisio Kwong MD Family Medicine Office Visit Testosterone deficiency in male

## 2024-03-31 DIAGNOSIS — I10 ESSENTIAL HYPERTENSION: ICD-10-CM

## 2024-04-01 RX ORDER — LOSARTAN POTASSIUM AND HYDROCHLOROTHIAZIDE 12.5; 1 MG/1; MG/1
TABLET ORAL
Qty: 30 TABLET | Refills: 5 | Status: SHIPPED | OUTPATIENT
Start: 2024-04-01

## 2024-04-01 NOTE — TELEPHONE ENCOUNTER
Future Appointments    Encounter Information   Provider Department Appt Notes   4/22/2024 Dionisio Kwong MD [facility] Primary Care Follow UP     Past Visits    Date Provider Specialty Visit Type Primary Dx   02/12/2024 Ramona Bangura MD Primary Care Office Visit Thoracic spine pain   11/30/2023 Dionisio Kwong MD Family Medicine Office Visit Testosterone deficiency in male   11/02/2023 Dionisio Kwong MD Family Medicine Office Visit Essential hypertension   10/05/2023 Dionisio Kwong MD Family Medicine Office Visit Other insomnia   09/21/2023 Dionisio Kwong MD Family Medicine Office Visit Uncontrolled hypertension

## 2024-08-27 ENCOUNTER — OFFICE VISIT (OUTPATIENT)
Dept: FAMILY MEDICINE CLINIC | Age: 39
End: 2024-08-27
Payer: COMMERCIAL

## 2024-08-27 VITALS
HEIGHT: 68 IN | WEIGHT: 261 LBS | OXYGEN SATURATION: 99 % | HEART RATE: 96 BPM | DIASTOLIC BLOOD PRESSURE: 92 MMHG | SYSTOLIC BLOOD PRESSURE: 136 MMHG | BODY MASS INDEX: 39.56 KG/M2

## 2024-08-27 DIAGNOSIS — E29.1 TESTOSTERONE DEFICIENCY IN MALE: ICD-10-CM

## 2024-08-27 DIAGNOSIS — Z15.89 HETEROZYGOUS MTHFR MUTATION C677T: ICD-10-CM

## 2024-08-27 DIAGNOSIS — E29.1 TESTOSTERONE DEFICIENCY IN MALE: Primary | ICD-10-CM

## 2024-08-27 DIAGNOSIS — M94.0 COSTOCHONDRITIS: ICD-10-CM

## 2024-08-27 DIAGNOSIS — M10.9 GOUTY ARTHRITIS OF LEFT GREAT TOE: ICD-10-CM

## 2024-08-27 DIAGNOSIS — I10 UNCONTROLLED HYPERTENSION: ICD-10-CM

## 2024-08-27 LAB
ALBUMIN SERPL-MCNC: 4.8 G/DL (ref 3.5–4.6)
ALP SERPL-CCNC: 71 U/L (ref 35–104)
ALT SERPL-CCNC: 95 U/L (ref 0–41)
ANION GAP SERPL CALCULATED.3IONS-SCNC: 10 MEQ/L (ref 9–15)
AST SERPL-CCNC: 62 U/L (ref 0–40)
BASOPHILS # BLD: 0 K/UL (ref 0–0.2)
BASOPHILS NFR BLD: 0.3 %
BILIRUB SERPL-MCNC: 0.6 MG/DL (ref 0.2–0.7)
BUN SERPL-MCNC: 16 MG/DL (ref 6–20)
CALCIUM SERPL-MCNC: 9.7 MG/DL (ref 8.5–9.9)
CHLORIDE SERPL-SCNC: 100 MEQ/L (ref 95–107)
CO2 SERPL-SCNC: 27 MEQ/L (ref 20–31)
CREAT SERPL-MCNC: 0.97 MG/DL (ref 0.7–1.2)
EOSINOPHIL # BLD: 0.1 K/UL (ref 0–0.7)
EOSINOPHIL NFR BLD: 0.8 %
ERYTHROCYTE [DISTWIDTH] IN BLOOD BY AUTOMATED COUNT: 11.8 % (ref 11.5–14.5)
GLOBULIN SER CALC-MCNC: 2.8 G/DL (ref 2.3–3.5)
GLUCOSE SERPL-MCNC: 118 MG/DL (ref 70–99)
HCT VFR BLD AUTO: 45.5 % (ref 42–52)
HGB BLD-MCNC: 15.7 G/DL (ref 14–18)
LYMPHOCYTES # BLD: 2 K/UL (ref 1–4.8)
LYMPHOCYTES NFR BLD: 26.4 %
MCH RBC QN AUTO: 28.2 PG (ref 27–31.3)
MCHC RBC AUTO-ENTMCNC: 34.5 % (ref 33–37)
MCV RBC AUTO: 81.8 FL (ref 79–92.2)
MONOCYTES # BLD: 0.5 K/UL (ref 0.2–0.8)
MONOCYTES NFR BLD: 6.6 %
NEUTROPHILS # BLD: 4.9 K/UL (ref 1.4–6.5)
NEUTS SEG NFR BLD: 65.5 %
PLATELET # BLD AUTO: 204 K/UL (ref 130–400)
POTASSIUM SERPL-SCNC: 4.2 MEQ/L (ref 3.4–4.9)
PROT SERPL-MCNC: 7.6 G/DL (ref 6.3–8)
RBC # BLD AUTO: 5.56 M/UL (ref 4.7–6.1)
SODIUM SERPL-SCNC: 137 MEQ/L (ref 135–144)
WBC # BLD AUTO: 7.5 K/UL (ref 4.8–10.8)

## 2024-08-27 PROCEDURE — G8427 DOCREV CUR MEDS BY ELIG CLIN: HCPCS | Performed by: FAMILY MEDICINE

## 2024-08-27 PROCEDURE — 99214 OFFICE O/P EST MOD 30 MIN: CPT | Performed by: FAMILY MEDICINE

## 2024-08-27 PROCEDURE — G8417 CALC BMI ABV UP PARAM F/U: HCPCS | Performed by: FAMILY MEDICINE

## 2024-08-27 PROCEDURE — 3080F DIAST BP >= 90 MM HG: CPT | Performed by: FAMILY MEDICINE

## 2024-08-27 PROCEDURE — 1036F TOBACCO NON-USER: CPT | Performed by: FAMILY MEDICINE

## 2024-08-27 PROCEDURE — 3075F SYST BP GE 130 - 139MM HG: CPT | Performed by: FAMILY MEDICINE

## 2024-08-27 RX ORDER — CLONIDINE HYDROCHLORIDE 0.2 MG/1
0.2 TABLET ORAL 2 TIMES DAILY
Qty: 60 TABLET | Refills: 5 | Status: SHIPPED | OUTPATIENT
Start: 2024-08-27

## 2024-08-27 RX ORDER — ALLOPURINOL 100 MG/1
100 TABLET ORAL 2 TIMES DAILY
Qty: 60 TABLET | Refills: 5 | Status: SHIPPED | OUTPATIENT
Start: 2024-08-27

## 2024-08-27 RX ORDER — TESTOSTERONE CYPIONATE 200 MG/ML
100 INJECTION, SOLUTION INTRAMUSCULAR
Qty: 3 ML | Refills: 0 | Status: SHIPPED | OUTPATIENT
Start: 2024-08-27 | End: 2024-11-27

## 2024-08-27 RX ORDER — LEVOMEFOLATE CALCIUM 7.5 MG TABLET
7.5 TABLET DAILY
Qty: 1 TABLET | Refills: 0
Start: 2024-08-27

## 2024-08-27 SDOH — ECONOMIC STABILITY: INCOME INSECURITY: HOW HARD IS IT FOR YOU TO PAY FOR THE VERY BASICS LIKE FOOD, HOUSING, MEDICAL CARE, AND HEATING?: NOT VERY HARD

## 2024-08-27 SDOH — ECONOMIC STABILITY: FOOD INSECURITY: WITHIN THE PAST 12 MONTHS, THE FOOD YOU BOUGHT JUST DIDN'T LAST AND YOU DIDN'T HAVE MONEY TO GET MORE.: NEVER TRUE

## 2024-08-27 SDOH — ECONOMIC STABILITY: FOOD INSECURITY: WITHIN THE PAST 12 MONTHS, YOU WORRIED THAT YOUR FOOD WOULD RUN OUT BEFORE YOU GOT MONEY TO BUY MORE.: NEVER TRUE

## 2024-08-27 ASSESSMENT — ENCOUNTER SYMPTOMS
ABDOMINAL PAIN: 0
CHEST TIGHTNESS: 0
ABDOMINAL DISTENTION: 0
PHOTOPHOBIA: 0
SHORTNESS OF BREATH: 0

## 2024-08-27 NOTE — PATIENT INSTRUCTIONS
Patient will continue allopurinol and methyl folate for associated medical diseases.    Patient's blood pressure is currently uncontrolled however home monitor reveals good control so leaving clonidine dose alone.    Patient will restart testosterone and perform testosterone labs today.    Follow-up in 3 to 4 weeks

## 2024-08-27 NOTE — PROGRESS NOTES
Diagnosis Orders   1. Testosterone deficiency in male  Testosterone, free, total    Comprehensive Metabolic Panel    CBC with Auto Differential    testosterone cypionate (DEPO-TESTOSTERONE) 200 MG/ML injection      2. Uncontrolled hypertension  cloNIDine (CATAPRES) 0.2 MG tablet      3. Gouty arthritis of left great toe  allopurinol (ZYLOPRIM) 100 MG tablet      4. Heterozygous MTHFR mutation C677T  methylfolate (DEPLIN) 7.5 MG TABS tablet      5. Costochondritis          Return for 3-4 weeks.  Patient Instructions   Patient will continue allopurinol and methyl folate for associated medical diseases.    Patient's blood pressure is currently uncontrolled however home monitor reveals good control so leaving clonidine dose alone.    Patient will restart testosterone and perform testosterone labs today.    Patient is going to be more cautious with sodium intake    Follow-up in 3 to 4 weeks    Subjective:      Patient ID: Kenney Maldonado is a 38 y.o. male who presents for:  Chief Complaint   Patient presents with    Hypertension     Blood pressure has been high and pt has been having a strange sensation in chest. Pt stopped Viibryd cold turkey in Feb.        Drinks a lot of soda and drinks that contain sodium.  Drinks a lot of coffee.  Does not drink a lot of water.  Has been avoiding alcohol lately.    Has not been compliant with diet or exercise due to travel for work.  That starting to change.    Patient stopped Vilazodone and testosterone at the same time did not notice side effects but is interested in restarting at least the testosterone due to diagnosis of deficiency.    Home blood pressure today was 118/70    Review of systems is negative for cardiovascular symptoms.  1 discussing chest symptoms in greater detail it is a superficial sensation that the patient can feel by pushing with his hands        Current Outpatient Medications on File Prior to Visit   Medication Sig Dispense Refill

## 2024-08-28 LAB
SHBG SERPL-SCNC: 35 NMOL/L (ref 17–56)
TESTOST FREE SERPL-MCNC: 35.7 PG/ML (ref 47–244)
TESTOST SERPL-MCNC: 195 NG/DL (ref 249–836)

## 2024-08-28 NOTE — RESULT ENCOUNTER NOTE
Notify patient it was a good time for him to change his lifestyle because his liver functions are becoming more abnormal.  We will repeat those in 3 months to confirm they have normalized.    Also testosterone deficiency remains evident.  Initiate treatment.    Glucose was elevated but that is likely because he was not fasting.

## 2024-08-30 ENCOUNTER — TELEPHONE (OUTPATIENT)
Dept: FAMILY MEDICINE CLINIC | Age: 39
End: 2024-08-30

## 2024-11-01 DIAGNOSIS — I10 ESSENTIAL HYPERTENSION: ICD-10-CM

## 2024-11-01 RX ORDER — LOSARTAN POTASSIUM AND HYDROCHLOROTHIAZIDE 12.5; 1 MG/1; MG/1
1 TABLET ORAL DAILY
Qty: 30 TABLET | Refills: 5 | Status: SHIPPED | OUTPATIENT
Start: 2024-11-01

## 2024-11-01 NOTE — TELEPHONE ENCOUNTER
Comments:     Last Office Visit (last PCP visit):   8/27/2024    Next Visit Date:  Future Appointments   Date Time Provider Department Center   11/20/2024  5:30 PM Dionisio Kwong MD [facility] ECC DEP       **If hasn't been seen in over a year OR hasn't followed up according to last diabetes/ADHD visit, make appointment for patient before sending refill to provider.    Rx requested:  Requested Prescriptions     Pending Prescriptions Disp Refills    losartan-hydroCHLOROthiazide (HYZAAR) 100-12.5 MG per tablet 30 tablet 5     Sig: Take 1 tablet by mouth daily

## 2024-11-14 DIAGNOSIS — I10 ESSENTIAL HYPERTENSION: ICD-10-CM

## 2024-11-14 DIAGNOSIS — E29.1 TESTOSTERONE DEFICIENCY IN MALE: ICD-10-CM

## 2024-11-14 RX ORDER — LOSARTAN POTASSIUM AND HYDROCHLOROTHIAZIDE 12.5; 1 MG/1; MG/1
1 TABLET ORAL DAILY
Qty: 30 TABLET | Refills: 5 | Status: CANCELLED | OUTPATIENT
Start: 2024-11-14

## 2024-11-14 RX ORDER — TESTOSTERONE CYPIONATE 200 MG/ML
100 INJECTION, SOLUTION INTRAMUSCULAR
Qty: 2 ML | Refills: 0 | Status: SHIPPED | OUTPATIENT
Start: 2024-11-14 | End: 2024-12-12

## 2024-11-14 NOTE — TELEPHONE ENCOUNTER
Comments:     Last Office Visit (last PCP visit):   8/27/2024    Next Visit Date:  Future Appointments   Date Time Provider Department Center   12/18/2024  5:30 PM Dionisio Kwong MD [facility] DEP       **If hasn't been seen in over a year OR hasn't followed up according to last diabetes/ADHD visit, make appointment for patient before sending refill to provider.    Rx requested:  Requested Prescriptions     Pending Prescriptions Disp Refills    testosterone cypionate (DEPO-TESTOSTERONE) 200 MG/ML injection 3 mL 0     Sig: Inject 0.5 mLs into the muscle every 14 days for 92 days. Max Daily Amount: 100 mg

## 2024-12-09 DIAGNOSIS — E29.1 TESTOSTERONE DEFICIENCY IN MALE: ICD-10-CM

## 2024-12-09 DIAGNOSIS — E29.1 TESTOSTERONE DEFICIENCY IN MALE: Primary | ICD-10-CM

## 2024-12-09 RX ORDER — TESTOSTERONE CYPIONATE 200 MG/ML
INJECTION, SOLUTION INTRAMUSCULAR
Qty: 2 ML | Refills: 0 | Status: SHIPPED | OUTPATIENT
Start: 2024-12-09 | End: 2025-01-08

## 2024-12-09 NOTE — TELEPHONE ENCOUNTER
Comments:     Last Office Visit (last PCP visit):   8/27/2024    Next Visit Date:  Future Appointments   Date Time Provider Department Center   12/18/2024  5:30 PM Dionisio Kwong MD [facility] E[facility] DEP       **If hasn't been seen in over a year OR hasn't followed up according to last diabetes/ADHD visit, make appointment for patient before sending refill to provider.    Rx requested:  Requested Prescriptions     Pending Prescriptions Disp Refills    testosterone cypionate (DEPOTESTOTERONE CYPIONATE) 200 MG/ML injection [Pharmacy Med Name: testosterone cypionate 200 mg/mL intramuscular oil] 2 mL 0     Sig: INJECT 0.5ml INTRAMUSCULARLY EVERY 14 days. discard remaining medication in single-use vial after EACH injection

## 2025-01-09 DIAGNOSIS — E29.1 TESTOSTERONE DEFICIENCY IN MALE: ICD-10-CM

## 2025-01-09 RX ORDER — TESTOSTERONE CYPIONATE 200 MG/ML
INJECTION, SOLUTION INTRAMUSCULAR
Qty: 2 ML | Refills: 0 | Status: SHIPPED | OUTPATIENT
Start: 2025-01-09 | End: 2025-02-06

## 2025-01-10 NOTE — TELEPHONE ENCOUNTER
Pt called in to advise he is aware he needs labs and he is going to get them done on MLK day. His work hours are the same as our lab hours, (I advised him the [facility] has lab hours on the weekend also)

## 2025-01-20 DIAGNOSIS — E29.1 TESTOSTERONE DEFICIENCY IN MALE: ICD-10-CM

## 2025-01-21 LAB
SHBG SERPL-SCNC: 44 NMOL/L (ref 17–56)
TESTOST FREE SERPL-MCNC: 98.7 PG/ML (ref 47–244)
TESTOST SERPL-MCNC: 555 NG/DL (ref 249–836)

## 2025-01-21 NOTE — RESULT ENCOUNTER NOTE
I am not receiving the result from the CMP and it does not look like they do the CBC.  I need all 3 results.  CMP, CBC and testosterone in order to evaluate his medication.  Can you please find out what happened with this and get it corrected

## 2025-01-22 NOTE — RESULT ENCOUNTER NOTE
Yes.  Unfortunately we not only need to confirm that his testosterone therapy is in therapeutic range but we also need to confirm is not causing side effects and the other lab values.  That is why all 3 are important..  Encourage patient to communicate with hospital about financial issues.  Sometimes they have special screening days.

## 2025-02-18 ENCOUNTER — TELEPHONE (OUTPATIENT)
Dept: FAMILY MEDICINE CLINIC | Age: 40
End: 2025-02-18

## 2025-02-18 DIAGNOSIS — E29.1 TESTOSTERONE DEFICIENCY IN MALE: ICD-10-CM

## 2025-02-18 LAB
ALBUMIN SERPL-MCNC: 4.6 G/DL (ref 3.5–4.6)
ALP SERPL-CCNC: 79 U/L (ref 35–104)
ALT SERPL-CCNC: 49 U/L (ref 0–41)
ANION GAP SERPL CALCULATED.3IONS-SCNC: 10 MEQ/L (ref 9–15)
AST SERPL-CCNC: 37 U/L (ref 0–40)
BASOPHILS # BLD: 0 K/UL (ref 0–0.2)
BASOPHILS NFR BLD: 0.5 %
BILIRUB SERPL-MCNC: 0.5 MG/DL (ref 0.2–0.7)
BUN SERPL-MCNC: 17 MG/DL (ref 6–20)
CALCIUM SERPL-MCNC: 9.6 MG/DL (ref 8.5–9.9)
CHLORIDE SERPL-SCNC: 98 MEQ/L (ref 95–107)
CO2 SERPL-SCNC: 30 MEQ/L (ref 20–31)
CREAT SERPL-MCNC: 0.97 MG/DL (ref 0.7–1.2)
EOSINOPHIL # BLD: 0.1 K/UL (ref 0–0.7)
EOSINOPHIL NFR BLD: 0.8 %
ERYTHROCYTE [DISTWIDTH] IN BLOOD BY AUTOMATED COUNT: 11.9 % (ref 11.5–14.5)
GLOBULIN SER CALC-MCNC: 2.7 G/DL (ref 2.3–3.5)
GLUCOSE SERPL-MCNC: 121 MG/DL (ref 70–99)
HCT VFR BLD AUTO: 47.6 % (ref 42–52)
HGB BLD-MCNC: 16.2 G/DL (ref 14–18)
LYMPHOCYTES # BLD: 2.5 K/UL (ref 1–4.8)
LYMPHOCYTES NFR BLD: 39.8 %
MCH RBC QN AUTO: 27.5 PG (ref 27–31.3)
MCHC RBC AUTO-ENTMCNC: 34 % (ref 33–37)
MCV RBC AUTO: 80.7 FL (ref 79–92.2)
MONOCYTES # BLD: 0.4 K/UL (ref 0.2–0.8)
MONOCYTES NFR BLD: 5.9 %
NEUTROPHILS # BLD: 3.3 K/UL (ref 1.4–6.5)
NEUTS SEG NFR BLD: 52.8 %
PLATELET # BLD AUTO: 180 K/UL (ref 130–400)
POTASSIUM SERPL-SCNC: 4.6 MEQ/L (ref 3.4–4.9)
PROT SERPL-MCNC: 7.3 G/DL (ref 6.3–8)
RBC # BLD AUTO: 5.9 M/UL (ref 4.7–6.1)
SODIUM SERPL-SCNC: 138 MEQ/L (ref 135–144)
WBC # BLD AUTO: 6.2 K/UL (ref 4.8–10.8)

## 2025-02-18 NOTE — TELEPHONE ENCOUNTER
*2/18 collected.    Will call [facility] in [place] to confirm whether this was collected or not.

## 2025-02-18 NOTE — TELEPHONE ENCOUNTER
[facility] lab in [place] calling to see if the lab for CMP can be ordered it looks like it has been canceled and pt was told he was suppose to to have it. Pt is at the lab now and waiting

## 2025-03-05 ENCOUNTER — OFFICE VISIT (OUTPATIENT)
Dept: FAMILY MEDICINE CLINIC | Age: 40
End: 2025-03-05
Payer: COMMERCIAL

## 2025-03-05 VITALS
TEMPERATURE: 97.9 F | WEIGHT: 267 LBS | SYSTOLIC BLOOD PRESSURE: 136 MMHG | HEIGHT: 68 IN | BODY MASS INDEX: 40.47 KG/M2 | OXYGEN SATURATION: 97 % | DIASTOLIC BLOOD PRESSURE: 70 MMHG | HEART RATE: 91 BPM

## 2025-03-05 DIAGNOSIS — M10.9 GOUTY ARTHRITIS OF LEFT GREAT TOE: ICD-10-CM

## 2025-03-05 DIAGNOSIS — E29.1 TESTOSTERONE DEFICIENCY IN MALE: ICD-10-CM

## 2025-03-05 DIAGNOSIS — I10 ESSENTIAL HYPERTENSION: ICD-10-CM

## 2025-03-05 DIAGNOSIS — I10 UNCONTROLLED HYPERTENSION: ICD-10-CM

## 2025-03-05 PROCEDURE — 3078F DIAST BP <80 MM HG: CPT | Performed by: FAMILY MEDICINE

## 2025-03-05 PROCEDURE — 99214 OFFICE O/P EST MOD 30 MIN: CPT | Performed by: FAMILY MEDICINE

## 2025-03-05 PROCEDURE — 3075F SYST BP GE 130 - 139MM HG: CPT | Performed by: FAMILY MEDICINE

## 2025-03-05 RX ORDER — CLONIDINE HYDROCHLORIDE 0.2 MG/1
0.2 TABLET ORAL 2 TIMES DAILY
Qty: 60 TABLET | Refills: 5 | Status: SHIPPED | OUTPATIENT
Start: 2025-03-05 | End: 2025-03-05 | Stop reason: DRUGHIGH

## 2025-03-05 RX ORDER — TESTOSTERONE CYPIONATE 200 MG/ML
100 INJECTION, SOLUTION INTRAMUSCULAR
Qty: 2 ML | Refills: 2 | Status: SHIPPED | OUTPATIENT
Start: 2025-03-05 | End: 2025-06-03

## 2025-03-05 RX ORDER — LOSARTAN POTASSIUM AND HYDROCHLOROTHIAZIDE 12.5; 1 MG/1; MG/1
1 TABLET ORAL DAILY
Qty: 30 TABLET | Refills: 5 | Status: SHIPPED | OUTPATIENT
Start: 2025-03-05

## 2025-03-05 RX ORDER — ALLOPURINOL 100 MG/1
100 TABLET ORAL 2 TIMES DAILY
Qty: 60 TABLET | Refills: 5 | Status: SHIPPED | OUTPATIENT
Start: 2025-03-05

## 2025-03-05 RX ORDER — CLONIDINE HYDROCHLORIDE 0.3 MG/1
0.3 TABLET ORAL 2 TIMES DAILY
Qty: 60 TABLET | Refills: 5 | Status: SHIPPED | OUTPATIENT
Start: 2025-03-05

## 2025-03-05 SDOH — ECONOMIC STABILITY: FOOD INSECURITY: WITHIN THE PAST 12 MONTHS, YOU WORRIED THAT YOUR FOOD WOULD RUN OUT BEFORE YOU GOT MONEY TO BUY MORE.: NEVER TRUE

## 2025-03-05 SDOH — ECONOMIC STABILITY: FOOD INSECURITY: WITHIN THE PAST 12 MONTHS, THE FOOD YOU BOUGHT JUST DIDN'T LAST AND YOU DIDN'T HAVE MONEY TO GET MORE.: NEVER TRUE

## 2025-03-05 ASSESSMENT — PATIENT HEALTH QUESTIONNAIRE - PHQ9
SUM OF ALL RESPONSES TO PHQ QUESTIONS 1-9: 0
2. FEELING DOWN, DEPRESSED OR HOPELESS: NOT AT ALL
1. LITTLE INTEREST OR PLEASURE IN DOING THINGS: NOT AT ALL
SUM OF ALL RESPONSES TO PHQ QUESTIONS 1-9: 0

## 2025-03-05 ASSESSMENT — ENCOUNTER SYMPTOMS
ABDOMINAL DISTENTION: 0
SHORTNESS OF BREATH: 0
ABDOMINAL PAIN: 0
PHOTOPHOBIA: 0
CHEST TIGHTNESS: 0

## 2025-03-05 NOTE — PATIENT INSTRUCTIONS
Patient's blood pressure is near goal we are going to increase clonidine to 0.3 mg twice a day and recheck in 3 months.    Refill of allopurinol for control of gout.    Restart testosterone out CBC CMP are determined to be normal.    Follow-up appointment 3 months.  Labs 3 to 6 months

## 2025-03-05 NOTE — PROGRESS NOTES
medications      Hypodermic Needles-Disposable Misc  1 Application by Does not apply route every 14 days  Started by: Dr. Dionisio Kwong MD     NEEDLE (DISP) 23 G 23G X 1\" Misc  1 Application by Does not apply route every 14 days  Started by: Dr. Dionisio Kwong MD            CHANGE how you take these medications      cloNIDine 0.3 MG tablet  Commonly known as: CATAPRES  Take 1 tablet by mouth 2 times daily  What changed:   medication strength  how much to take  Changed by: Dr. Dionisio Kwong MD     testosterone cypionate 200 MG/ML injection  Commonly known as: DEPOTESTOTERONE CYPIONATE  Inject 0.5 mLs into the muscle every 14 days for 90 days. Max Daily Amount: 100 mg  What changed: See the new instructions.  Changed by: Dr. Dionisio Kwong MD            CONTINUE taking these medications      allopurinol 100 MG tablet  Commonly known as: ZYLOPRIM  Take 1 tablet by mouth 2 times daily     losartan-hydroCHLOROthiazide 100-12.5 MG per tablet  Commonly known as: HYZAAR  Take 1 tablet by mouth daily     methylfolate 7.5 MG Tabs tablet  Commonly known as: DEPLIN  Take 7.5 tablets by mouth daily     pantoprazole 40 MG tablet  Commonly known as: Protonix  Take 1 tablet by mouth daily as needed (acid reflux)     therapeutic multivitamin-minerals tablet     vilazodone HCl 20 MG Tabs  Commonly known as: VIIBRYD  TAKE 1 TABLET BY MOUTH ONCE DAILY               Where to Get Your Medications        These medications were sent to [id number] - [place], [place] - [address] - P [phone] - F [phone]  [address] [place] [id number]      Phone: [phone]   allopurinol 100 MG tablet  cloNIDine 0.3 MG tablet  Hypodermic Needles-Disposable Misc  losartan-hydroCHLOROthiazide 100-12.5 MG per tablet  NEEDLE (DISP) 23 G 23G X 1\" Misc  testosterone cypionate 200 MG/ML injection           Plan:   Return in about 3 months (around 6/5/2025) for for review of outcome of today's

## 2025-07-09 DIAGNOSIS — E29.1 TESTOSTERONE DEFICIENCY IN MALE: ICD-10-CM

## 2025-07-09 DIAGNOSIS — Z51.81 ENCOUNTER FOR MEDICATION MONITORING: Primary | ICD-10-CM

## 2025-07-09 RX ORDER — TESTOSTERONE CYPIONATE 200 MG/ML
100 INJECTION, SOLUTION INTRAMUSCULAR
Qty: 2 ML | Refills: 0 | Status: SHIPPED | OUTPATIENT
Start: 2025-07-09 | End: 2025-10-07

## 2025-07-09 NOTE — TELEPHONE ENCOUNTER
Comments:     Last Office Visit (last PCP visit):   3/5/2025    Next Visit Date:  Future Appointments   Date Time Provider Department Center   8/6/2025  5:00 PM Dionisio Kwong MD [facility] E[facility] DEP       **If hasn't been seen in over a year OR hasn't followed up according to last diabetes/ADHD visit, make appointment for patient before sending refill to provider.    Rx requested:  Requested Prescriptions     Pending Prescriptions Disp Refills    testosterone cypionate (DEPOTESTOTERONE CYPIONATE) 200 MG/ML injection [Pharmacy Med Name: testosterone cypionate 200 mg/mL intramuscular oil] 2 mL 2     Sig: Inject 0.5 mLs into the muscle every 14 days for 90 days. Max Daily Amount: 100 mg